# Patient Record
Sex: FEMALE | Race: WHITE | Employment: FULL TIME | URBAN - NONMETROPOLITAN AREA
[De-identification: names, ages, dates, MRNs, and addresses within clinical notes are randomized per-mention and may not be internally consistent; named-entity substitution may affect disease eponyms.]

---

## 2019-08-01 ENCOUNTER — OFFICE VISIT (OUTPATIENT)
Dept: FAMILY MEDICINE CLINIC | Age: 69
End: 2019-08-01
Payer: COMMERCIAL

## 2019-08-01 ENCOUNTER — HOSPITAL ENCOUNTER (OUTPATIENT)
Age: 69
Discharge: HOME | End: 2019-08-03
Payer: COMMERCIAL

## 2019-08-01 VITALS
WEIGHT: 90 LBS | HEIGHT: 62 IN | SYSTOLIC BLOOD PRESSURE: 132 MMHG | TEMPERATURE: 97.6 F | BODY MASS INDEX: 16.56 KG/M2 | HEART RATE: 62 BPM | DIASTOLIC BLOOD PRESSURE: 74 MMHG | OXYGEN SATURATION: 96 %

## 2019-08-01 DIAGNOSIS — N30.01 ACUTE CYSTITIS WITH HEMATURIA: ICD-10-CM

## 2019-08-01 DIAGNOSIS — R30.0 DYSURIA: ICD-10-CM

## 2019-08-01 DIAGNOSIS — R30.0 DYSURIA: Primary | ICD-10-CM

## 2019-08-01 LAB
BILIRUBIN, POC: NORMAL
BLOOD URINE, POC: NORMAL
CLARITY, POC: CLEAR
COLOR, POC: YELLOW
GLUCOSE URINE, POC: NORMAL
KETONES, POC: NORMAL
LEUKOCYTE EST, POC: NORMAL
NITRITE, POC: POSITIVE
PH, POC: 6.5
PROTEIN, POC: 100
SPECIFIC GRAVITY, POC: 1.02
UROBILINOGEN, POC: NORMAL

## 2019-08-01 PROCEDURE — 99213 OFFICE O/P EST LOW 20 MIN: CPT | Performed by: PHYSICIAN ASSISTANT

## 2019-08-01 PROCEDURE — 87088 URINE BACTERIA CULTURE: CPT

## 2019-08-01 PROCEDURE — 87186 SC STD MICRODIL/AGAR DIL: CPT

## 2019-08-01 PROCEDURE — 81002 URINALYSIS NONAUTO W/O SCOPE: CPT | Performed by: PHYSICIAN ASSISTANT

## 2019-08-01 RX ORDER — PHENAZOPYRIDINE HYDROCHLORIDE 200 MG/1
200 TABLET, FILM COATED ORAL 3 TIMES DAILY PRN
Qty: 9 TABLET | Refills: 0 | Status: SHIPPED | OUTPATIENT
Start: 2019-08-01 | End: 2019-08-04

## 2019-08-01 RX ORDER — SULFAMETHOXAZOLE AND TRIMETHOPRIM 800; 160 MG/1; MG/1
1 TABLET ORAL 2 TIMES DAILY
Qty: 14 TABLET | Refills: 0 | Status: SHIPPED | OUTPATIENT
Start: 2019-08-01 | End: 2019-08-08

## 2019-08-01 RX ORDER — METOPROLOL SUCCINATE 100 MG/1
TABLET, EXTENDED RELEASE ORAL
Refills: 11 | COMMUNITY
Start: 2019-07-08

## 2019-08-01 NOTE — PROGRESS NOTES
2019   [address] [place] [address] PRIMARY CARE  [address]  [place] [address]     Lisa Taylor  : 1950  Age: 68 y.o. Sex: female       Subjective:  Chief Complaint   Patient presents with    Urinary Tract Infection       HPI: The patient states that they have had dysuria and urinary frequency for the last 1 day. patient does not feel like she is emptying her bladder. Denies back or flank pain. The patient denies suprapubic pressure. The patient has had urgency and but denies gross hematuria. The patient denies any abdominal or flank pain. The patient denies nausea and vomiting. No fevers or chills. No prior history of kidney stones. The patient has a history of UTI's and states that this feels the same. They come to the urgent care for evaluation. ROS:  Positive and pertinent negatives as per HPI. All other systems are reviewed and negative. Current Outpatient Medications:     sulfamethoxazole-trimethoprim (BACTRIM DS;SEPTRA DS) 800-160 MG per tablet, Take 1 tablet by mouth 2 times daily for 7 days, Disp: 14 tablet, Rfl: 0    phenazopyridine (PYRIDIUM) 200 MG tablet, Take 1 tablet by mouth 3 times daily as needed for Pain, Disp: 9 tablet, Rfl: 0    metoprolol succinate (TOPROL XL) 100 MG extended release tablet, TAKE ONE TABLET BY MOUTH EVERY DAY, Disp: , Rfl: 11    VENTOLIN  (90 Base) MCG/ACT inhaler, INHALE 2 PUFFS EVERY 4 HOURS AS NEEDED FOR SHORTNESS OF BREATH, Disp: , Rfl: 6   No Known Allergies     Objective:  Vitals:    19 1518   BP: 132/74   Site: Left Upper Arm   Position: Sitting   Cuff Size: Medium Adult   Pulse: 62   Temp: 97.6 °F (36.4 °C)   TempSrc: Temporal   SpO2: 96%   Weight: 90 lb (40.8 kg)   Height: 5' 1.5\" (1.562 m)        Exam:  Const: Appears healthy and well developed. No signs of acute distress present. Vitals reviewed per triage. Head/Face: Normocephalic, atraumatic. Facies is symmetric.   ENMT: Buccal

## 2019-08-04 LAB
ORGANISM: ABNORMAL
URINE CULTURE, ROUTINE: ABNORMAL
URINE CULTURE, ROUTINE: ABNORMAL

## 2019-09-03 ENCOUNTER — OFFICE VISIT (OUTPATIENT)
Dept: FAMILY MEDICINE CLINIC | Age: 69
End: 2019-09-03
Payer: COMMERCIAL

## 2019-09-03 VITALS
DIASTOLIC BLOOD PRESSURE: 88 MMHG | SYSTOLIC BLOOD PRESSURE: 138 MMHG | HEART RATE: 68 BPM | TEMPERATURE: 97.7 F | WEIGHT: 89 LBS | OXYGEN SATURATION: 94 % | HEIGHT: 61 IN | BODY MASS INDEX: 16.8 KG/M2

## 2019-09-03 DIAGNOSIS — L30.9 DERMATITIS: Primary | ICD-10-CM

## 2019-09-03 PROCEDURE — 99212 OFFICE O/P EST SF 10 MIN: CPT | Performed by: NURSE PRACTITIONER

## 2019-09-03 RX ORDER — PREDNISONE 10 MG/1
TABLET ORAL
Qty: 30 TABLET | Refills: 0 | Status: SHIPPED | OUTPATIENT
Start: 2019-09-03 | End: 2019-09-15

## 2019-09-03 RX ORDER — TRIAMCINOLONE ACETONIDE 0.25 MG/G
CREAM TOPICAL
Qty: 80 G | Refills: 0 | Status: SHIPPED | OUTPATIENT
Start: 2019-09-03

## 2019-09-03 NOTE — PROGRESS NOTES
Subjective:  Chief Complaint   Patient presents with    Rash     started on back then to legs and arms       HPI:  Patient states rash started several weeks ago. Initially, she had some lesions to her back, but she now has them to bilateral lower extremities and upper extremities. Appears to be worse around the flexor surfaces of her ankles, but it is also over the long bones of both the upper and lower extremities. It is very pruritic. She has tried IV dry without much relief. It is not painful. Fever or chills. Denies any new lotions, soaps, detergents, foods. She has not had any problems with rashes in the past.    ROS:  Positive and pertinent negatives as per HPI. All other systems are reviewed and negative. Current Outpatient Medications:     predniSONE (DELTASONE) 10 MG tablet, Take 4 tablets by mouth daily for 3 days, THEN 3 tablets daily for 3 days, THEN 2 tablets daily for 3 days, THEN 1 tablet daily for 3 days. , Disp: 30 tablet, Rfl: 0    triamcinolone (KENALOG) 0.025 % cream, Apply topically 2 times daily. , Disp: 80 g, Rfl: 0    metoprolol succinate (TOPROL XL) 100 MG extended release tablet, TAKE ONE TABLET BY MOUTH EVERY DAY, Disp: , Rfl: 11    VENTOLIN  (90 Base) MCG/ACT inhaler, INHALE 2 PUFFS EVERY 4 HOURS AS NEEDED FOR SHORTNESS OF BREATH, Disp: , Rfl: 6   No Known Allergies     Objective:  Vitals:    09/03/19 1501   BP: 138/88   Site: Left Upper Arm   Position: Sitting   Cuff Size: Medium Adult   Pulse: 68   Temp: 97.7 °F (36.5 °C)   TempSrc: Temporal   SpO2: 94%   Weight: 89 lb (40.4 kg)   Height: 5' 1\" (1.549 m)        Exam:  Const: Appears healthy and well developed. No signs of acute distress present. Vitals reviewed per triage. Appears very thin. Head/Face: Normocephalic, atraumatic. Facies is symmetric. ENMT:  Nares are patent. Buccal mucosa is moist.  No inflammation or edema of the posterior oropharynx. Neck: Trachea midline.   Cardiac: Regular rate and

## 2020-03-13 ENCOUNTER — APPOINTMENT (OUTPATIENT)
Dept: GENERAL RADIOLOGY | Age: 70
DRG: 673 | End: 2020-03-13
Attending: INTERNAL MEDICINE
Payer: COMMERCIAL

## 2020-03-13 ENCOUNTER — HOSPITAL ENCOUNTER (INPATIENT)
Age: 70
LOS: 13 days | Discharge: NURSING, REHAB OR LONG-TERM CARE | DRG: 673 | End: 2020-03-26
Attending: INTERNAL MEDICINE | Admitting: INTERNAL MEDICINE
Payer: COMMERCIAL

## 2020-03-13 PROBLEM — E87.5 HYPERKALEMIA: Status: ACTIVE | Noted: 2020-03-13

## 2020-03-13 LAB
ANION GAP SERPL CALCULATED.3IONS-SCNC: 20 MMOL/L (ref 7–16)
B.E.: -4.1 MMOL/L (ref -3–3)
BUN BLDV-MCNC: 57 MG/DL (ref 8–23)
CALCIUM SERPL-MCNC: 9.2 MG/DL (ref 8.6–10.2)
CHLORIDE BLD-SCNC: 99 MMOL/L (ref 98–107)
CO2: 19 MMOL/L (ref 22–29)
COHB: 0.5 % (ref 0–1.5)
CREAT SERPL-MCNC: 3.4 MG/DL (ref 0.5–1)
CRITICAL: ABNORMAL
DATE ANALYZED: ABNORMAL
DATE OF COLLECTION: ABNORMAL
GFR AFRICAN AMERICAN: 16
GFR NON-AFRICAN AMERICAN: 13 ML/MIN/1.73
GLUCOSE BLD-MCNC: 125 MG/DL (ref 74–99)
HCO3: 20.5 MMOL/L (ref 22–26)
HHB: 3 % (ref 0–5)
INR BLD: 1.4
LAB: ABNORMAL
Lab: ABNORMAL
METHB: 0.6 % (ref 0–1.5)
MODE: ABNORMAL
O2 CONTENT: 16.1 ML/DL
O2 SATURATION: 97 % (ref 92–98.5)
O2HB: 95.9 % (ref 94–97)
OPERATOR ID: ABNORMAL
PATIENT TEMP: 37 C
PCO2: 35.9 MMHG (ref 35–45)
PH BLOOD GAS: 7.38 (ref 7.35–7.45)
PO2: 102.6 MMHG (ref 75–100)
POTASSIUM SERPL-SCNC: 5.64 MMOL/L (ref 3.5–5)
POTASSIUM SERPL-SCNC: 5.9 MMOL/L (ref 3.5–5)
PROTHROMBIN TIME: 15.4 SEC (ref 9.3–12.4)
SODIUM BLD-SCNC: 138 MMOL/L (ref 132–146)
SOURCE, BLOOD GAS: ABNORMAL
THB: 11.8 G/DL (ref 11.5–16.5)
TIME ANALYZED: 2211

## 2020-03-13 PROCEDURE — 83735 ASSAY OF MAGNESIUM: CPT

## 2020-03-13 PROCEDURE — 71045 X-RAY EXAM CHEST 1 VIEW: CPT

## 2020-03-13 PROCEDURE — 84443 ASSAY THYROID STIM HORMONE: CPT

## 2020-03-13 PROCEDURE — 84145 PROCALCITONIN (PCT): CPT

## 2020-03-13 PROCEDURE — 85610 PROTHROMBIN TIME: CPT

## 2020-03-13 PROCEDURE — 6360000002 HC RX W HCPCS: Performed by: INTERNAL MEDICINE

## 2020-03-13 PROCEDURE — 87081 CULTURE SCREEN ONLY: CPT

## 2020-03-13 PROCEDURE — 82746 ASSAY OF FOLIC ACID SERUM: CPT

## 2020-03-13 PROCEDURE — 2000000000 HC ICU R&B

## 2020-03-13 PROCEDURE — 6360000002 HC RX W HCPCS

## 2020-03-13 PROCEDURE — 83690 ASSAY OF LIPASE: CPT

## 2020-03-13 PROCEDURE — 36415 COLL VENOUS BLD VENIPUNCTURE: CPT

## 2020-03-13 PROCEDURE — 83880 ASSAY OF NATRIURETIC PEPTIDE: CPT

## 2020-03-13 PROCEDURE — 2580000003 HC RX 258

## 2020-03-13 PROCEDURE — 82140 ASSAY OF AMMONIA: CPT

## 2020-03-13 PROCEDURE — 84132 ASSAY OF SERUM POTASSIUM: CPT

## 2020-03-13 PROCEDURE — 0100U HC RESPIRPTHGN MULT REV TRANS & AMP PRB TECH 21 TRGT: CPT

## 2020-03-13 PROCEDURE — 82805 BLOOD GASES W/O2 SATURATION: CPT

## 2020-03-13 PROCEDURE — 84100 ASSAY OF PHOSPHORUS: CPT

## 2020-03-13 PROCEDURE — 94640 AIRWAY INHALATION TREATMENT: CPT

## 2020-03-13 PROCEDURE — 82607 VITAMIN B-12: CPT

## 2020-03-13 PROCEDURE — 85730 THROMBOPLASTIN TIME PARTIAL: CPT

## 2020-03-13 PROCEDURE — 2580000003 HC RX 258: Performed by: INTERNAL MEDICINE

## 2020-03-13 PROCEDURE — 94660 CPAP INITIATION&MGMT: CPT

## 2020-03-13 PROCEDURE — 80048 BASIC METABOLIC PNL TOTAL CA: CPT

## 2020-03-13 RX ORDER — METHYLPREDNISOLONE SODIUM SUCCINATE 40 MG/ML
40 INJECTION, POWDER, LYOPHILIZED, FOR SOLUTION INTRAMUSCULAR; INTRAVENOUS EVERY 8 HOURS
Status: DISCONTINUED | OUTPATIENT
Start: 2020-03-13 | End: 2020-03-14

## 2020-03-13 RX ORDER — HYDRALAZINE HYDROCHLORIDE 20 MG/ML
INJECTION INTRAMUSCULAR; INTRAVENOUS
Status: COMPLETED
Start: 2020-03-13 | End: 2020-03-13

## 2020-03-13 RX ORDER — SODIUM CHLORIDE 0.9 % (FLUSH) 0.9 %
10 SYRINGE (ML) INJECTION PRN
Status: DISCONTINUED | OUTPATIENT
Start: 2020-03-13 | End: 2020-03-17 | Stop reason: SDUPTHER

## 2020-03-13 RX ORDER — SODIUM POLYSTYRENE SULFONATE 15 G/60ML
15 SUSPENSION ORAL; RECTAL ONCE
Status: DISCONTINUED | OUTPATIENT
Start: 2020-03-13 | End: 2020-03-18

## 2020-03-13 RX ORDER — DEXTROSE MONOHYDRATE 25 G/50ML
12.5 INJECTION, SOLUTION INTRAVENOUS PRN
Status: DISCONTINUED | OUTPATIENT
Start: 2020-03-13 | End: 2020-03-26 | Stop reason: HOSPADM

## 2020-03-13 RX ORDER — FUROSEMIDE 10 MG/ML
40 INJECTION INTRAMUSCULAR; INTRAVENOUS ONCE
Status: COMPLETED | OUTPATIENT
Start: 2020-03-13 | End: 2020-03-13

## 2020-03-13 RX ORDER — POLYETHYLENE GLYCOL 3350 17 G/17G
17 POWDER, FOR SOLUTION ORAL DAILY PRN
Status: DISCONTINUED | OUTPATIENT
Start: 2020-03-13 | End: 2020-03-17 | Stop reason: SDUPTHER

## 2020-03-13 RX ORDER — HYDRALAZINE HYDROCHLORIDE 20 MG/ML
5 INJECTION INTRAMUSCULAR; INTRAVENOUS EVERY 4 HOURS PRN
Status: DISCONTINUED | OUTPATIENT
Start: 2020-03-13 | End: 2020-03-14

## 2020-03-13 RX ORDER — NICOTINE POLACRILEX 4 MG
15 LOZENGE BUCCAL PRN
Status: DISCONTINUED | OUTPATIENT
Start: 2020-03-13 | End: 2020-03-18 | Stop reason: SDUPTHER

## 2020-03-13 RX ORDER — NICOTINE POLACRILEX 4 MG
15 LOZENGE BUCCAL PRN
Status: DISCONTINUED | OUTPATIENT
Start: 2020-03-13 | End: 2020-03-26 | Stop reason: HOSPADM

## 2020-03-13 RX ORDER — PROMETHAZINE HYDROCHLORIDE 25 MG/1
12.5 TABLET ORAL EVERY 6 HOURS PRN
Status: DISCONTINUED | OUTPATIENT
Start: 2020-03-13 | End: 2020-03-17 | Stop reason: SDUPTHER

## 2020-03-13 RX ORDER — SODIUM CHLORIDE 0.9 % (FLUSH) 0.9 %
10 SYRINGE (ML) INJECTION EVERY 12 HOURS SCHEDULED
Status: DISCONTINUED | OUTPATIENT
Start: 2020-03-13 | End: 2020-03-17 | Stop reason: SDUPTHER

## 2020-03-13 RX ORDER — THIAMINE HYDROCHLORIDE 100 MG/ML
100 INJECTION, SOLUTION INTRAMUSCULAR; INTRAVENOUS DAILY
Status: DISCONTINUED | OUTPATIENT
Start: 2020-03-14 | End: 2020-03-16 | Stop reason: ALTCHOICE

## 2020-03-13 RX ORDER — ONDANSETRON 2 MG/ML
4 INJECTION INTRAMUSCULAR; INTRAVENOUS EVERY 6 HOURS PRN
Status: DISCONTINUED | OUTPATIENT
Start: 2020-03-13 | End: 2020-03-17 | Stop reason: SDUPTHER

## 2020-03-13 RX ORDER — IPRATROPIUM BROMIDE AND ALBUTEROL SULFATE 2.5; .5 MG/3ML; MG/3ML
1 SOLUTION RESPIRATORY (INHALATION)
Status: DISCONTINUED | OUTPATIENT
Start: 2020-03-14 | End: 2020-03-21

## 2020-03-13 RX ORDER — DEXTROSE MONOHYDRATE 50 MG/ML
100 INJECTION, SOLUTION INTRAVENOUS PRN
Status: DISCONTINUED | OUTPATIENT
Start: 2020-03-13 | End: 2020-03-26 | Stop reason: HOSPADM

## 2020-03-13 RX ORDER — ACETAMINOPHEN 650 MG/1
650 SUPPOSITORY RECTAL EVERY 6 HOURS PRN
Status: DISCONTINUED | OUTPATIENT
Start: 2020-03-13 | End: 2020-03-17 | Stop reason: SDUPTHER

## 2020-03-13 RX ORDER — ARFORMOTEROL TARTRATE 15 UG/2ML
15 SOLUTION RESPIRATORY (INHALATION) 2 TIMES DAILY
Status: DISCONTINUED | OUTPATIENT
Start: 2020-03-13 | End: 2020-03-16

## 2020-03-13 RX ORDER — DEXTROSE MONOHYDRATE 25 G/50ML
12.5 INJECTION, SOLUTION INTRAVENOUS PRN
Status: DISCONTINUED | OUTPATIENT
Start: 2020-03-13 | End: 2020-03-18 | Stop reason: SDUPTHER

## 2020-03-13 RX ORDER — CALCIUM GLUCONATE 94 MG/ML
1 INJECTION, SOLUTION INTRAVENOUS ONCE
Status: COMPLETED | OUTPATIENT
Start: 2020-03-13 | End: 2020-03-13

## 2020-03-13 RX ORDER — ALBUTEROL SULFATE 2.5 MG/3ML
10 SOLUTION RESPIRATORY (INHALATION) ONCE
Status: COMPLETED | OUTPATIENT
Start: 2020-03-13 | End: 2020-03-13

## 2020-03-13 RX ORDER — HEPARIN SODIUM 10000 [USP'U]/ML
5000 INJECTION, SOLUTION INTRAVENOUS; SUBCUTANEOUS EVERY 8 HOURS
Status: DISCONTINUED | OUTPATIENT
Start: 2020-03-13 | End: 2020-03-14

## 2020-03-13 RX ORDER — DEXTROSE MONOHYDRATE 50 MG/ML
100 INJECTION, SOLUTION INTRAVENOUS PRN
Status: DISCONTINUED | OUTPATIENT
Start: 2020-03-13 | End: 2020-03-18 | Stop reason: SDUPTHER

## 2020-03-13 RX ORDER — DEXTROSE MONOHYDRATE 25 G/50ML
25 INJECTION, SOLUTION INTRAVENOUS ONCE
Status: DISCONTINUED | OUTPATIENT
Start: 2020-03-13 | End: 2020-03-26 | Stop reason: HOSPADM

## 2020-03-13 RX ORDER — BUDESONIDE 0.25 MG/2ML
250 INHALANT ORAL 2 TIMES DAILY
Status: DISCONTINUED | OUTPATIENT
Start: 2020-03-13 | End: 2020-03-21

## 2020-03-13 RX ORDER — DEXTROSE MONOHYDRATE 25 G/50ML
25 INJECTION, SOLUTION INTRAVENOUS ONCE
Status: COMPLETED | OUTPATIENT
Start: 2020-03-13 | End: 2020-03-14

## 2020-03-13 RX ORDER — ACETAMINOPHEN 325 MG/1
650 TABLET ORAL EVERY 6 HOURS PRN
Status: DISCONTINUED | OUTPATIENT
Start: 2020-03-13 | End: 2020-03-17 | Stop reason: SDUPTHER

## 2020-03-13 RX ORDER — FOLIC ACID 5 MG/ML
1 INJECTION, SOLUTION INTRAMUSCULAR; INTRAVENOUS; SUBCUTANEOUS DAILY
Status: DISCONTINUED | OUTPATIENT
Start: 2020-03-14 | End: 2020-03-16 | Stop reason: ALTCHOICE

## 2020-03-13 RX ADMIN — ARFORMOTEROL TARTRATE 15 MCG: 15 SOLUTION RESPIRATORY (INHALATION) at 22:13

## 2020-03-13 RX ADMIN — SODIUM CHLORIDE, PRESERVATIVE FREE 10 ML: 5 INJECTION INTRAVENOUS at 22:18

## 2020-03-13 RX ADMIN — FUROSEMIDE 40 MG: 10 INJECTION, SOLUTION INTRAMUSCULAR; INTRAVENOUS at 23:56

## 2020-03-13 RX ADMIN — ALBUTEROL SULFATE 10 MG: 2.5 SOLUTION RESPIRATORY (INHALATION) at 23:05

## 2020-03-13 RX ADMIN — HYDRALAZINE HYDROCHLORIDE 20 MG: 20 INJECTION INTRAMUSCULAR; INTRAVENOUS at 22:17

## 2020-03-13 RX ADMIN — METHYLPREDNISOLONE SODIUM SUCCINATE 40 MG: 40 INJECTION, POWDER, FOR SOLUTION INTRAMUSCULAR; INTRAVENOUS at 23:56

## 2020-03-13 RX ADMIN — CALCIUM GLUCONATE 1 G: 98 INJECTION, SOLUTION INTRAVENOUS at 23:56

## 2020-03-13 RX ADMIN — HEPARIN SODIUM 5000 UNITS: 10000 INJECTION INTRAVENOUS; SUBCUTANEOUS at 23:56

## 2020-03-13 RX ADMIN — BUDESONIDE 250 MCG: 0.25 SUSPENSION RESPIRATORY (INHALATION) at 22:13

## 2020-03-13 ASSESSMENT — PAIN SCALES - GENERAL: PAINLEVEL_OUTOF10: 0

## 2020-03-14 ENCOUNTER — APPOINTMENT (OUTPATIENT)
Dept: CT IMAGING | Age: 70
DRG: 673 | End: 2020-03-14
Attending: INTERNAL MEDICINE
Payer: COMMERCIAL

## 2020-03-14 LAB
ACETAMINOPHEN LEVEL: <5 MCG/ML (ref 10–30)
ADENOVIRUS BY PCR: NOT DETECTED
ALBUMIN SERPL-MCNC: 3.4 G/DL (ref 3.5–5.2)
ALP BLD-CCNC: 100 U/L (ref 35–104)
ALT SERPL-CCNC: 685 U/L (ref 0–32)
AMMONIA: 47 UMOL/L (ref 11–51)
ANION GAP SERPL CALCULATED.3IONS-SCNC: 19 MMOL/L (ref 7–16)
ANION GAP SERPL CALCULATED.3IONS-SCNC: 20 MMOL/L (ref 7–16)
ANION GAP SERPL CALCULATED.3IONS-SCNC: 21 MMOL/L (ref 7–16)
ANISOCYTOSIS: ABNORMAL
ANISOCYTOSIS: ABNORMAL
APTT: 27.5 SEC (ref 24.5–35.1)
AST SERPL-CCNC: 1380 U/L (ref 0–31)
BASOPHILS ABSOLUTE: 0 E9/L (ref 0–0.2)
BASOPHILS ABSOLUTE: 0 E9/L (ref 0–0.2)
BASOPHILS RELATIVE PERCENT: 0.1 % (ref 0–2)
BASOPHILS RELATIVE PERCENT: 0.1 % (ref 0–2)
BILIRUB SERPL-MCNC: 0.8 MG/DL (ref 0–1.2)
BORDETELLA PARAPERTUSSIS BY PCR: NOT DETECTED
BORDETELLA PERTUSSIS BY PCR: NOT DETECTED
BUN BLDV-MCNC: 59 MG/DL (ref 8–23)
BUN BLDV-MCNC: 59 MG/DL (ref 8–23)
BUN BLDV-MCNC: 68 MG/DL (ref 8–23)
C-REACTIVE PROTEIN: 6.1 MG/DL (ref 0–0.4)
CALCIUM SERPL-MCNC: 7.8 MG/DL (ref 8.6–10.2)
CALCIUM SERPL-MCNC: 8.8 MG/DL (ref 8.6–10.2)
CALCIUM SERPL-MCNC: 9.3 MG/DL (ref 8.6–10.2)
CHLAMYDOPHILIA PNEUMONIAE BY PCR: NOT DETECTED
CHLORIDE BLD-SCNC: 100 MMOL/L (ref 98–107)
CHLORIDE BLD-SCNC: 97 MMOL/L (ref 98–107)
CHLORIDE BLD-SCNC: 99 MMOL/L (ref 98–107)
CO2: 19 MMOL/L (ref 22–29)
CO2: 20 MMOL/L (ref 22–29)
CO2: 21 MMOL/L (ref 22–29)
CORONAVIRUS 229E BY PCR: NOT DETECTED
CORONAVIRUS HKU1 BY PCR: NOT DETECTED
CORONAVIRUS NL63 BY PCR: NOT DETECTED
CORONAVIRUS OC43 BY PCR: NOT DETECTED
CREAT SERPL-MCNC: 3.7 MG/DL (ref 0.5–1)
CREAT SERPL-MCNC: 3.8 MG/DL (ref 0.5–1)
CREAT SERPL-MCNC: 4.5 MG/DL (ref 0.5–1)
EOSINOPHILS ABSOLUTE: 0 E9/L (ref 0.05–0.5)
EOSINOPHILS ABSOLUTE: 0 E9/L (ref 0.05–0.5)
EOSINOPHILS RELATIVE PERCENT: 0 % (ref 0–6)
EOSINOPHILS RELATIVE PERCENT: 0 % (ref 0–6)
FERRITIN: 1036 NG/ML
FOLATE: 12.5 NG/ML (ref 4.8–24.2)
GAMMA GLUTAMYL TRANSFERASE: 112 U/L (ref 6–42)
GFR AFRICAN AMERICAN: 12
GFR AFRICAN AMERICAN: 14
GFR AFRICAN AMERICAN: 15
GFR NON-AFRICAN AMERICAN: 10 ML/MIN/1.73
GFR NON-AFRICAN AMERICAN: 12 ML/MIN/1.73
GFR NON-AFRICAN AMERICAN: 12 ML/MIN/1.73
GLUCOSE BLD-MCNC: 106 MG/DL (ref 74–99)
GLUCOSE BLD-MCNC: 117 MG/DL (ref 74–99)
GLUCOSE BLD-MCNC: 171 MG/DL (ref 74–99)
HCT VFR BLD CALC: 33.9 % (ref 34–48)
HCT VFR BLD CALC: 34 % (ref 34–48)
HCT VFR BLD CALC: 34.2 % (ref 34–48)
HEMOGLOBIN: 10.4 G/DL (ref 11.5–15.5)
HEMOGLOBIN: 10.6 G/DL (ref 11.5–15.5)
HEMOGLOBIN: 10.8 G/DL (ref 11.5–15.5)
HUMAN METAPNEUMOVIRUS BY PCR: NOT DETECTED
HUMAN RHINOVIRUS/ENTEROVIRUS BY PCR: NOT DETECTED
INFLUENZA A BY PCR: NOT DETECTED
INFLUENZA B BY PCR: NOT DETECTED
IRON SATURATION: 13 % (ref 15–50)
IRON: 36 MCG/DL (ref 37–145)
LACTIC ACID: 1.1 MMOL/L (ref 0.5–2.2)
LACTIC ACID: 1.6 MMOL/L (ref 0.5–2.2)
LIPASE: 492 U/L (ref 13–60)
LYMPHOCYTES ABSOLUTE: 0.15 E9/L (ref 1.5–4)
LYMPHOCYTES ABSOLUTE: 0.29 E9/L (ref 1.5–4)
LYMPHOCYTES RELATIVE PERCENT: 0.9 % (ref 20–42)
LYMPHOCYTES RELATIVE PERCENT: 1.7 % (ref 20–42)
MAGNESIUM: 2.3 MG/DL (ref 1.6–2.6)
MAGNESIUM: 2.5 MG/DL (ref 1.6–2.6)
MCH RBC QN AUTO: 29.7 PG (ref 26–35)
MCH RBC QN AUTO: 30.5 PG (ref 26–35)
MCHC RBC AUTO-ENTMCNC: 31 % (ref 32–34.5)
MCHC RBC AUTO-ENTMCNC: 31.8 % (ref 32–34.5)
MCV RBC AUTO: 95.8 FL (ref 80–99.9)
MCV RBC AUTO: 96 FL (ref 80–99.9)
METER GLUCOSE: 119 MG/DL (ref 74–99)
METER GLUCOSE: 122 MG/DL (ref 74–99)
METER GLUCOSE: 130 MG/DL (ref 74–99)
METER GLUCOSE: 136 MG/DL (ref 74–99)
METER GLUCOSE: 139 MG/DL (ref 74–99)
MONOCYTES ABSOLUTE: 0.72 E9/L (ref 0.1–0.95)
MONOCYTES ABSOLUTE: 0.87 E9/L (ref 0.1–0.95)
MONOCYTES RELATIVE PERCENT: 5.2 % (ref 2–12)
MONOCYTES RELATIVE PERCENT: 6.1 % (ref 2–12)
MYCOPLASMA PNEUMONIAE BY PCR: NOT DETECTED
NEUTROPHILS ABSOLUTE: 13.3 E9/L (ref 1.8–7.3)
NEUTROPHILS ABSOLUTE: 13.49 E9/L (ref 1.8–7.3)
NEUTROPHILS RELATIVE PERCENT: 93 % (ref 43–80)
NEUTROPHILS RELATIVE PERCENT: 93 % (ref 43–80)
NUCLEATED RED BLOOD CELLS: 1.7 /100 WBC
NUCLEATED RED BLOOD CELLS: 2.6 /100 WBC
OSMOLALITY: 310 MOSM/KG (ref 285–310)
OVALOCYTES: ABNORMAL
OVALOCYTES: ABNORMAL
PARAINFLUENZA VIRUS 1 BY PCR: NOT DETECTED
PARAINFLUENZA VIRUS 2 BY PCR: NOT DETECTED
PARAINFLUENZA VIRUS 3 BY PCR: NOT DETECTED
PARAINFLUENZA VIRUS 4 BY PCR: NOT DETECTED
PDW BLD-RTO: 15.5 FL (ref 11.5–15)
PDW BLD-RTO: 15.6 FL (ref 11.5–15)
PHOSPHORUS: 6.5 MG/DL (ref 2.5–4.5)
PHOSPHORUS: 6.9 MG/DL (ref 2.5–4.5)
PLATELET # BLD: 103 E9/L (ref 130–450)
PLATELET # BLD: 112 E9/L (ref 130–450)
PMV BLD AUTO: 10.1 FL (ref 7–12)
PMV BLD AUTO: 11.1 FL (ref 7–12)
POIKILOCYTES: ABNORMAL
POIKILOCYTES: ABNORMAL
POLYCHROMASIA: ABNORMAL
POLYCHROMASIA: ABNORMAL
POTASSIUM SERPL-SCNC: 5.1 MMOL/L (ref 3.5–5)
POTASSIUM SERPL-SCNC: 5.2 MMOL/L (ref 3.5–5)
POTASSIUM SERPL-SCNC: 5.3 MMOL/L (ref 3.5–5)
PRO-BNP: ABNORMAL PG/ML (ref 0–125)
PROCALCITONIN: 0.92 NG/ML (ref 0–0.08)
RBC # BLD: 3.54 E12/L (ref 3.5–5.5)
RBC # BLD: 3.57 E12/L (ref 3.5–5.5)
RESPIRATORY SYNCYTIAL VIRUS BY PCR: NOT DETECTED
SCHISTOCYTES: ABNORMAL
SCHISTOCYTES: ABNORMAL
SODIUM BLD-SCNC: 137 MMOL/L (ref 132–146)
SODIUM BLD-SCNC: 139 MMOL/L (ref 132–146)
SODIUM BLD-SCNC: 140 MMOL/L (ref 132–146)
T4 TOTAL: 5.4 MCG/DL (ref 4.5–11.7)
TARGET CELLS: ABNORMAL
TOTAL IRON BINDING CAPACITY: 269 MCG/DL (ref 250–450)
TOTAL PROTEIN: 6.2 G/DL (ref 6.4–8.3)
TROPONIN: 0.05 NG/ML (ref 0–0.03)
TROPONIN: 0.05 NG/ML (ref 0–0.03)
TSH SERPL DL<=0.05 MIU/L-ACNC: 4.61 UIU/ML (ref 0.27–4.2)
VITAMIN B-12: 1712 PG/ML (ref 211–946)
WBC # BLD: 14.3 E9/L (ref 4.5–11.5)
WBC # BLD: 14.5 E9/L (ref 4.5–11.5)

## 2020-03-14 PROCEDURE — 82728 ASSAY OF FERRITIN: CPT

## 2020-03-14 PROCEDURE — 2580000003 HC RX 258: Performed by: INTERNAL MEDICINE

## 2020-03-14 PROCEDURE — 86645 CMV ANTIBODY IGM: CPT

## 2020-03-14 PROCEDURE — 99254 IP/OBS CNSLTJ NEW/EST MOD 60: CPT | Performed by: INTERNAL MEDICINE

## 2020-03-14 PROCEDURE — 82977 ASSAY OF GGT: CPT

## 2020-03-14 PROCEDURE — 2500000003 HC RX 250 WO HCPCS

## 2020-03-14 PROCEDURE — 94660 CPAP INITIATION&MGMT: CPT

## 2020-03-14 PROCEDURE — 36556 INSERT NON-TUNNEL CV CATH: CPT

## 2020-03-14 PROCEDURE — 84484 ASSAY OF TROPONIN QUANT: CPT

## 2020-03-14 PROCEDURE — 2580000003 HC RX 258: Performed by: RADIOLOGY

## 2020-03-14 PROCEDURE — 85014 HEMATOCRIT: CPT

## 2020-03-14 PROCEDURE — 6360000002 HC RX W HCPCS: Performed by: INTERNAL MEDICINE

## 2020-03-14 PROCEDURE — 2000000000 HC ICU R&B

## 2020-03-14 PROCEDURE — 86038 ANTINUCLEAR ANTIBODIES: CPT

## 2020-03-14 PROCEDURE — 86665 EPSTEIN-BARR CAPSID VCA: CPT

## 2020-03-14 PROCEDURE — 2500000003 HC RX 250 WO HCPCS: Performed by: INTERNAL MEDICINE

## 2020-03-14 PROCEDURE — 83930 ASSAY OF BLOOD OSMOLALITY: CPT

## 2020-03-14 PROCEDURE — 6370000000 HC RX 637 (ALT 250 FOR IP): Performed by: INTERNAL MEDICINE

## 2020-03-14 PROCEDURE — 80307 DRUG TEST PRSMV CHEM ANLYZR: CPT

## 2020-03-14 PROCEDURE — 86706 HEP B SURFACE ANTIBODY: CPT

## 2020-03-14 PROCEDURE — 99222 1ST HOSP IP/OBS MODERATE 55: CPT | Performed by: SURGERY

## 2020-03-14 PROCEDURE — 80048 BASIC METABOLIC PNL TOTAL CA: CPT

## 2020-03-14 PROCEDURE — 99255 IP/OBS CONSLTJ NEW/EST HI 80: CPT | Performed by: INTERNAL MEDICINE

## 2020-03-14 PROCEDURE — 74176 CT ABD & PELVIS W/O CONTRAST: CPT

## 2020-03-14 PROCEDURE — APPSS180 APP SPLIT SHARED TIME > 60 MINUTES: Performed by: NURSE PRACTITIONER

## 2020-03-14 PROCEDURE — 86140 C-REACTIVE PROTEIN: CPT

## 2020-03-14 PROCEDURE — 84100 ASSAY OF PHOSPHORUS: CPT

## 2020-03-14 PROCEDURE — 83540 ASSAY OF IRON: CPT

## 2020-03-14 PROCEDURE — 80074 ACUTE HEPATITIS PANEL: CPT

## 2020-03-14 PROCEDURE — 82962 GLUCOSE BLOOD TEST: CPT

## 2020-03-14 PROCEDURE — 85025 COMPLETE CBC W/AUTO DIFF WBC: CPT

## 2020-03-14 PROCEDURE — 86644 CMV ANTIBODY: CPT

## 2020-03-14 PROCEDURE — 83550 IRON BINDING TEST: CPT

## 2020-03-14 PROCEDURE — 83735 ASSAY OF MAGNESIUM: CPT

## 2020-03-14 PROCEDURE — 83605 ASSAY OF LACTIC ACID: CPT

## 2020-03-14 PROCEDURE — 6370000000 HC RX 637 (ALT 250 FOR IP): Performed by: NURSE PRACTITIONER

## 2020-03-14 PROCEDURE — C9113 INJ PANTOPRAZOLE SODIUM, VIA: HCPCS | Performed by: INTERNAL MEDICINE

## 2020-03-14 PROCEDURE — 85018 HEMOGLOBIN: CPT

## 2020-03-14 PROCEDURE — 80053 COMPREHEN METABOLIC PANEL: CPT

## 2020-03-14 PROCEDURE — 06HY33Z INSERTION OF INFUSION DEVICE INTO LOWER VEIN, PERCUTANEOUS APPROACH: ICD-10-PCS | Performed by: SURGERY

## 2020-03-14 PROCEDURE — 84436 ASSAY OF TOTAL THYROXINE: CPT

## 2020-03-14 PROCEDURE — 2700000000 HC OXYGEN THERAPY PER DAY

## 2020-03-14 PROCEDURE — 36415 COLL VENOUS BLD VENIPUNCTURE: CPT

## 2020-03-14 PROCEDURE — 94640 AIRWAY INHALATION TREATMENT: CPT

## 2020-03-14 RX ORDER — METOPROLOL TARTRATE 5 MG/5ML
5 INJECTION INTRAVENOUS ONCE
Status: COMPLETED | OUTPATIENT
Start: 2020-03-14 | End: 2020-03-14

## 2020-03-14 RX ORDER — LABETALOL HYDROCHLORIDE 5 MG/ML
10 INJECTION, SOLUTION INTRAVENOUS ONCE
Status: COMPLETED | OUTPATIENT
Start: 2020-03-14 | End: 2020-03-14

## 2020-03-14 RX ORDER — PREDNISONE 20 MG/1
20 TABLET ORAL DAILY
Status: ON HOLD | COMMUNITY
End: 2020-03-26 | Stop reason: HOSPADM

## 2020-03-14 RX ORDER — SODIUM CHLORIDE 9 MG/ML
10 INJECTION INTRAVENOUS 2 TIMES DAILY
Status: DISCONTINUED | OUTPATIENT
Start: 2020-03-14 | End: 2020-03-16

## 2020-03-14 RX ORDER — METOPROLOL SUCCINATE 50 MG/1
50 TABLET, EXTENDED RELEASE ORAL 2 TIMES DAILY
Status: DISCONTINUED | OUTPATIENT
Start: 2020-03-14 | End: 2020-03-16 | Stop reason: SDUPTHER

## 2020-03-14 RX ORDER — SODIUM CHLORIDE 0.9 % (FLUSH) 0.9 %
10 SYRINGE (ML) INJECTION ONCE
Status: COMPLETED | OUTPATIENT
Start: 2020-03-14 | End: 2020-03-14

## 2020-03-14 RX ORDER — METOPROLOL TARTRATE 5 MG/5ML
5 INJECTION INTRAVENOUS EVERY 6 HOURS
Status: DISCONTINUED | OUTPATIENT
Start: 2020-03-14 | End: 2020-03-16

## 2020-03-14 RX ORDER — HYDRALAZINE HYDROCHLORIDE 20 MG/ML
5 INJECTION INTRAMUSCULAR; INTRAVENOUS ONCE
Status: COMPLETED | OUTPATIENT
Start: 2020-03-14 | End: 2020-03-14

## 2020-03-14 RX ORDER — SODIUM POLYSTYRENE SULFONATE 15 G/60ML
15 SUSPENSION ORAL; RECTAL ONCE
Status: COMPLETED | OUTPATIENT
Start: 2020-03-14 | End: 2020-03-14

## 2020-03-14 RX ORDER — DILTIAZEM HYDROCHLORIDE 180 MG/1
180 CAPSULE, EXTENDED RELEASE ORAL DAILY
Status: ON HOLD | COMMUNITY
End: 2020-03-26 | Stop reason: HOSPADM

## 2020-03-14 RX ORDER — FUROSEMIDE 10 MG/ML
60 INJECTION INTRAMUSCULAR; INTRAVENOUS ONCE
Status: COMPLETED | OUTPATIENT
Start: 2020-03-14 | End: 2020-03-14

## 2020-03-14 RX ORDER — FERROUS SULFATE 325(65) MG
325 TABLET ORAL 2 TIMES DAILY WITH MEALS
Status: DISCONTINUED | OUTPATIENT
Start: 2020-03-14 | End: 2020-03-26 | Stop reason: HOSPADM

## 2020-03-14 RX ORDER — SPIRONOLACTONE 25 MG/1
25 TABLET ORAL DAILY
Status: ON HOLD | COMMUNITY
End: 2020-03-26 | Stop reason: HOSPADM

## 2020-03-14 RX ORDER — METHYLPREDNISOLONE SODIUM SUCCINATE 40 MG/ML
40 INJECTION, POWDER, LYOPHILIZED, FOR SOLUTION INTRAMUSCULAR; INTRAVENOUS EVERY 6 HOURS
Status: DISCONTINUED | OUTPATIENT
Start: 2020-03-14 | End: 2020-03-15

## 2020-03-14 RX ORDER — PANTOPRAZOLE SODIUM 40 MG/10ML
40 INJECTION, POWDER, LYOPHILIZED, FOR SOLUTION INTRAVENOUS 2 TIMES DAILY
Status: DISCONTINUED | OUTPATIENT
Start: 2020-03-14 | End: 2020-03-16

## 2020-03-14 RX ORDER — DILTIAZEM HYDROCHLORIDE 180 MG/1
180 CAPSULE, COATED, EXTENDED RELEASE ORAL DAILY
Status: DISCONTINUED | OUTPATIENT
Start: 2020-03-14 | End: 2020-03-14

## 2020-03-14 RX ORDER — POTASSIUM CHLORIDE 750 MG/1
20 CAPSULE, EXTENDED RELEASE ORAL DAILY
COMMUNITY

## 2020-03-14 RX ORDER — SODIUM CHLORIDE 9 MG/ML
INJECTION, SOLUTION INTRAVENOUS CONTINUOUS
Status: DISCONTINUED | OUTPATIENT
Start: 2020-03-14 | End: 2020-03-15

## 2020-03-14 RX ORDER — 0.9 % SODIUM CHLORIDE 0.9 %
500 INTRAVENOUS SOLUTION INTRAVENOUS ONCE
Status: COMPLETED | OUTPATIENT
Start: 2020-03-14 | End: 2020-03-14

## 2020-03-14 RX ORDER — DILTIAZEM HYDROCHLORIDE 5 MG/ML
10 INJECTION INTRAVENOUS ONCE
Status: COMPLETED | OUTPATIENT
Start: 2020-03-14 | End: 2020-03-14

## 2020-03-14 RX ORDER — POTASSIUM CITRATE 10 MEQ/1
TABLET, EXTENDED RELEASE ORAL
COMMUNITY

## 2020-03-14 RX ORDER — METOPROLOL TARTRATE 5 MG/5ML
INJECTION INTRAVENOUS
Status: COMPLETED
Start: 2020-03-14 | End: 2020-03-14

## 2020-03-14 RX ADMIN — DILTIAZEM HYDROCHLORIDE 10 MG: 5 INJECTION INTRAVENOUS at 11:29

## 2020-03-14 RX ADMIN — METOPROLOL TARTRATE 5 MG: 5 INJECTION INTRAVENOUS at 08:50

## 2020-03-14 RX ADMIN — INSULIN HUMAN 10 UNITS: 100 INJECTION, SOLUTION PARENTERAL at 00:06

## 2020-03-14 RX ADMIN — METOPROLOL TARTRATE 5 MG: 5 INJECTION INTRAVENOUS at 04:25

## 2020-03-14 RX ADMIN — METOPROLOL SUCCINATE 50 MG: 50 TABLET, EXTENDED RELEASE ORAL at 20:16

## 2020-03-14 RX ADMIN — IPRATROPIUM BROMIDE AND ALBUTEROL SULFATE 1 AMPULE: 2.5; .5 SOLUTION RESPIRATORY (INHALATION) at 20:36

## 2020-03-14 RX ADMIN — SODIUM CHLORIDE, PRESERVATIVE FREE 10 ML: 5 INJECTION INTRAVENOUS at 08:51

## 2020-03-14 RX ADMIN — ARFORMOTEROL TARTRATE 15 MCG: 15 SOLUTION RESPIRATORY (INHALATION) at 08:51

## 2020-03-14 RX ADMIN — METOPROLOL TARTRATE 5 MG: 5 INJECTION INTRAVENOUS at 16:25

## 2020-03-14 RX ADMIN — LABETALOL HYDROCHLORIDE 10 MG: 5 INJECTION INTRAVENOUS at 07:46

## 2020-03-14 RX ADMIN — DEXTROSE MONOHYDRATE 5 MG/HR: 50 INJECTION, SOLUTION INTRAVENOUS at 11:32

## 2020-03-14 RX ADMIN — PANTOPRAZOLE SODIUM 40 MG: 40 INJECTION, POWDER, FOR SOLUTION INTRAVENOUS at 02:38

## 2020-03-14 RX ADMIN — SODIUM CHLORIDE 500 ML: 9 INJECTION, SOLUTION INTRAVENOUS at 10:40

## 2020-03-14 RX ADMIN — FOLIC ACID 1 MG: 5 INJECTION, SOLUTION INTRAMUSCULAR; INTRAVENOUS; SUBCUTANEOUS at 08:48

## 2020-03-14 RX ADMIN — HYDRALAZINE HYDROCHLORIDE 5 MG: 20 INJECTION INTRAMUSCULAR; INTRAVENOUS at 01:21

## 2020-03-14 RX ADMIN — METOPROLOL TARTRATE 5 MG: 5 INJECTION INTRAVENOUS at 20:16

## 2020-03-14 RX ADMIN — SODIUM CHLORIDE, PRESERVATIVE FREE 10 ML: 5 INJECTION INTRAVENOUS at 02:46

## 2020-03-14 RX ADMIN — SODIUM BICARBONATE: 84 INJECTION, SOLUTION INTRAVENOUS at 14:50

## 2020-03-14 RX ADMIN — METHYLPREDNISOLONE SODIUM SUCCINATE 40 MG: 40 INJECTION, POWDER, FOR SOLUTION INTRAMUSCULAR; INTRAVENOUS at 10:22

## 2020-03-14 RX ADMIN — HYDRALAZINE HYDROCHLORIDE 5 MG: 20 INJECTION INTRAMUSCULAR; INTRAVENOUS at 05:32

## 2020-03-14 RX ADMIN — DEXTROSE MONOHYDRATE 25 G: 25 INJECTION, SOLUTION INTRAVENOUS at 00:06

## 2020-03-14 RX ADMIN — SODIUM CHLORIDE: 9 INJECTION, SOLUTION INTRAVENOUS at 01:38

## 2020-03-14 RX ADMIN — BUDESONIDE 250 MCG: 0.25 SUSPENSION RESPIRATORY (INHALATION) at 20:37

## 2020-03-14 RX ADMIN — METHYLPREDNISOLONE SODIUM SUCCINATE 40 MG: 40 INJECTION, POWDER, FOR SOLUTION INTRAMUSCULAR; INTRAVENOUS at 23:00

## 2020-03-14 RX ADMIN — IPRATROPIUM BROMIDE AND ALBUTEROL SULFATE 1 AMPULE: 2.5; .5 SOLUTION RESPIRATORY (INHALATION) at 12:27

## 2020-03-14 RX ADMIN — ARFORMOTEROL TARTRATE 15 MCG: 15 SOLUTION RESPIRATORY (INHALATION) at 20:36

## 2020-03-14 RX ADMIN — SODIUM CHLORIDE, PRESERVATIVE FREE 10 ML: 5 INJECTION INTRAVENOUS at 20:16

## 2020-03-14 RX ADMIN — SODIUM POLYSTYRENE SULFONATE 15 G: 15 SUSPENSION ORAL; RECTAL at 20:39

## 2020-03-14 RX ADMIN — SODIUM CHLORIDE, PRESERVATIVE FREE 10 ML: 5 INJECTION INTRAVENOUS at 20:17

## 2020-03-14 RX ADMIN — FERROUS SULFATE TAB 325 MG (65 MG ELEMENTAL FE) 325 MG: 325 (65 FE) TAB at 11:40

## 2020-03-14 RX ADMIN — PANTOPRAZOLE SODIUM 40 MG: 40 INJECTION, POWDER, FOR SOLUTION INTRAVENOUS at 08:51

## 2020-03-14 RX ADMIN — IPRATROPIUM BROMIDE AND ALBUTEROL SULFATE 1 AMPULE: 2.5; .5 SOLUTION RESPIRATORY (INHALATION) at 16:19

## 2020-03-14 RX ADMIN — HYDRALAZINE HYDROCHLORIDE 5 MG: 20 INJECTION INTRAMUSCULAR; INTRAVENOUS at 06:00

## 2020-03-14 RX ADMIN — METHYLPREDNISOLONE SODIUM SUCCINATE 40 MG: 40 INJECTION, POWDER, FOR SOLUTION INTRAMUSCULAR; INTRAVENOUS at 16:24

## 2020-03-14 RX ADMIN — FERROUS SULFATE TAB 325 MG (65 MG ELEMENTAL FE) 325 MG: 325 (65 FE) TAB at 16:25

## 2020-03-14 RX ADMIN — METOPROLOL SUCCINATE 50 MG: 50 TABLET, EXTENDED RELEASE ORAL at 12:38

## 2020-03-14 RX ADMIN — BUDESONIDE 250 MCG: 0.25 SUSPENSION RESPIRATORY (INHALATION) at 08:51

## 2020-03-14 RX ADMIN — IPRATROPIUM BROMIDE AND ALBUTEROL SULFATE 1 AMPULE: 2.5; .5 SOLUTION RESPIRATORY (INHALATION) at 08:50

## 2020-03-14 RX ADMIN — AMPICILLIN SODIUM AND SULBACTAM SODIUM 3 G: 2; 1 INJECTION, POWDER, FOR SOLUTION INTRAMUSCULAR; INTRAVENOUS at 01:41

## 2020-03-14 RX ADMIN — SODIUM CHLORIDE, PRESERVATIVE FREE 10 ML: 5 INJECTION INTRAVENOUS at 02:40

## 2020-03-14 RX ADMIN — FUROSEMIDE 60 MG: 10 INJECTION, SOLUTION INTRAMUSCULAR; INTRAVENOUS at 16:25

## 2020-03-14 RX ADMIN — PANTOPRAZOLE SODIUM 40 MG: 40 INJECTION, POWDER, FOR SOLUTION INTRAVENOUS at 20:16

## 2020-03-14 RX ADMIN — THIAMINE HYDROCHLORIDE 100 MG: 100 INJECTION, SOLUTION INTRAMUSCULAR; INTRAVENOUS at 08:49

## 2020-03-14 RX ADMIN — PROMETHAZINE HYDROCHLORIDE 12.5 MG: 25 TABLET ORAL at 01:43

## 2020-03-14 SDOH — HEALTH STABILITY: MENTAL HEALTH: HOW MANY STANDARD DRINKS CONTAINING ALCOHOL DO YOU HAVE ON A TYPICAL DAY?: PATIENT DECLINED

## 2020-03-14 SDOH — HEALTH STABILITY: MENTAL HEALTH: HOW OFTEN DO YOU HAVE A DRINK CONTAINING ALCOHOL?: 2-3 TIMES A WEEK

## 2020-03-14 ASSESSMENT — PAIN SCALES - GENERAL
PAINLEVEL_OUTOF10: 0

## 2020-03-14 NOTE — H&P
Tati Contreras M.D. History and Physical      CHIEF COMPLAINT: Weakness    Reason for Admission: Hyperkalemia, atrial fibrillation with RVR    History Obtained From: Patient/EMR    HISTORY OF PRESENT ILLNESS:      The patient is a 68 y.o. female of Dr. Shawn Dangelo with significant past medical history of end-stage COPD who presents with complaints of weakness lethargy. Patient noted at [facility] to be bradycardic followed by atrial fibrillation with rapid ventricular rate. Patient also noted to be in acute renal failure with hyperkalemia. proBNP 50,000. Transferred to medical intensive care unit [facility]. On my evaluation, she is slow to respond but does seem to be appropriate. Remains on Cardizem drip. She is noted to be anemic as well with melena-source thought to be upper GI bleeding. She was transferred to [facility] secondary to inability to do vascular access at [facility] for possible emergent hemodialysis given hyperkalemia. BP (!) 162/88   Pulse 127   Temp 98.5 °F (36.9 °C) (Axillary)   Resp 18   Wt 97 lb (44 kg)   SpO2 98%   BMI 18.33 kg/m²   CT abdomen pelvis no acute inflammatory changes      Past Medical History:    No past medical history on file. Past Surgical History:    No past surgical history on file. Medications Prior to Admission:    Medications Prior to Admission: dilTIAZem (DILACOR XR) 180 MG extended release capsule, Take 180 mg by mouth daily 2 CAP PO DAILY  spironolactone (ALDACTONE) 25 MG tablet, Take 25 mg by mouth daily HALF TAB  triamcinolone (KENALOG) 0.025 % cream, Apply topically 2 times daily.   metoprolol succinate (TOPROL XL) 100 MG extended release tablet, TAKE ONE TABLET BY MOUTH EVERY DAY  VENTOLIN  (90 Base) MCG/ACT inhaler, INHALE 2 PUFFS EVERY 4 HOURS AS NEEDED FOR SHORTNESS OF BREATH  potassium citrate (UROCIT-K) 10 MEQ (1080 MG) extended release tablet, Take by mouth 3 times 03/14/20  0530   WBC 14.3* 14.5*   HGB 10.6* 10.8*   * 103*     Recent Labs     03/13/20  2210 03/13/20  2211 03/14/20  0209 03/14/20  0600     --  139 140   K 5.9* 5.64* 5.2* 5.3*   BUN 57*  --  59* 59*   CREATININE 3.4*  --  3.7* 3.8*     Recent Labs     03/13/20  2313 03/14/20  0600   PROT  --  6.2*   INR 1.4  --      Recent Labs     03/14/20  0600   AST 1,380*   *   ALKPHOS 100   BILITOT 0.8     No results for input(s): BNP in the last 72 hours. Recent Labs     03/14/20  0450 03/14/20  1003   TROPONINI 0.05* 0.05*       ASSESSMENT:      Active Problems:    Hyperkalemia    Atrial fibrillation (HCC)    Acute renal insufficiency    Chronic heart failure with preserved ejection fraction (HFpEF) (HCC)    Acute and chr resp failure, unsp w hypoxia or hypercapnia (HCC)    Essential hypertension  Resolved Problems:    * No resolved hospital problems.  *        PLAN:    Admit to medical intensive care unit for multiple issues  Atrial fibrillation with RVR  Continue Cardizem drip  Unable to anticoagulate secondary to GI bleed   echocardiogram given 50,000 BNP at the discretion of cardiology  Monitor in ICU  Cardiology following    Acute anemia concerning for GI bleed  For EGD, serial H&H's  General surgery following    Hyperkalemia with acute renal failure  Offending agents in the form of Aldactone/oral potassium supplements discontinued  IV fluid hydration  Consideration being given for dialysis-no immediate need      DVT prophylaxis  PT OT  Discharge plan      Ani Holguin MD  3/14/2020  4:43 PM

## 2020-03-14 NOTE — CONSULTS
GENERAL SURGERY  CONSULT NOTE  3/14/2020    Physician Consulted: Dr. Barrett Luna  Reason for Consult: GIB  Referring Physician: Dr. Cale Sanchez    [facility]  Paul Casanova is a 68 y.o. female who was sent in the [facility] for evaluation of left lower extremity weakness upon arrival she was found to be bradycardic in the 30s . She has a history of end-stage COPD was recently placed on oxygen and given prednisone. During that admission she was found to be hyperkalemic with a potassium greater than 7 and went into A. fib with RVR. She was transferred here for evaluation and possible hemodialysis. She is on home spironolactone and oral potassium which is been held. She denies any blood thinners. Dental surgery was consulted due to heme positive stools. She states her last EGD and colonoscopy were last fall of at [address] she had polyps removed but is unclear what was seen on her EGD. She denies any prior abdominal surgeries. She currently denies any abdominal pain. She denies fevers chills chest pain. She wears BiPAP overnight and is currently on 6 L nasal cannula    No past medical history on file. No past surgical history on file. Medications Prior to Admission:    Prior to Admission medications    Medication Sig Start Date End Date Taking? Authorizing Provider   dilTIAZem (DILACOR XR) 180 MG extended release capsule Take 180 mg by mouth daily 2 CAP PO DAILY   Yes Historical Provider, MD   spironolactone (ALDACTONE) 25 MG tablet Take 25 mg by mouth daily HALF TAB   Yes Historical Provider, MD   triamcinolone (KENALOG) 0.025 % cream Apply topically 2 times daily.  9/3/19  Yes SAMUEL Ellison CNP   metoprolol succinate (TOPROL XL) 100 MG extended release tablet TAKE ONE TABLET BY MOUTH EVERY DAY 7/8/19  Yes Historical Provider, MD   VENTOLIN  (90 Base) MCG/ACT inhaler INHALE 2 PUFFS EVERY 4 HOURS AS NEEDED FOR SHORTNESS OF BREATH 7/8/19  Yes Historical Provider, MD   potassium citrate (UROCIT-K) 10 MEQ (1080 MG) extended release tablet Take by mouth 3 times daily (with meals)    Historical Provider, MD   potassium chloride (MICRO-K) 10 MEQ extended release capsule Take 20 mEq by mouth daily    Historical Provider, MD   predniSONE (DELTASONE) 20 MG tablet Take 20 mg by mouth daily    Historical Provider, MD       No Known Allergies    No family history on file. Social History     Tobacco Use    Smoking status: Current Every Day Smoker     Packs/day: 1.00     Types: Cigarettes   Substance Use Topics    Alcohol use: Yes     Frequency: 2-3 times a week     Drinks per session: Patient refused     Binge frequency: Patient refused    Drug use: Not Currently     Types: Marijuana         Review of Systems: Pertinent ROS listed in HPI, all others negative     PHYSICAL EXAM:    Vitals:    03/14/20 1200   BP: (!) 163/101   Pulse: 138   Resp: 14   Temp: 98.5 °F (36.9 °C)   SpO2: 99%       GENERAL:  NAD. A&Ox3. HEAD:  Normocephalic. Atraumatic. EYES:   No scleral icterus. PERRL. LUNGS: On 6 L nasal cannula. Was on BiPAP overnight. CARDIOVASCULAR: A. fib with RVR  ABDOMEN:  Soft, non-distended, non-tender. No guarding, rigidity, rebound. EXTREMITIES:   No lower extremity edema  SKIN:  Warm and dry  NEUROLOGIC:  GCS   RECTAL: No external or obvious internal hemorrhoids. FOBT positive black stool    LABS:    CBC  Recent Labs     03/14/20  0530   WBC 14.5*   HGB 10.8*   HCT 34.0   *     BMP  Recent Labs     03/14/20  0600      K 5.3*      CO2 20*   BUN 59*   CREATININE 3.8*   CALCIUM 8.8     Liver Function  Recent Labs     03/13/20  2313 03/14/20  0600   LIPASE 492*  --    BILITOT  --  0.8   AST  --  1,380*   ALT  --  685*   ALKPHOS  --  100   PROT  --  6.2*   LABALBU  --  3.4*     No results for input(s): LACTATE in the last 72 hours.   Recent Labs     03/13/20  2313   INR 1.4       RADIOLOGY    Ct Abdomen Pelvis Wo Contrast Additional Contrast? None    Result Date:

## 2020-03-14 NOTE — PROGRESS NOTES
03/13/20 2213   NIV Type   $NIV $Daily Charge   Skin Assessment Clean, dry, & intact   Skin Protection for O2 Device Yes   Equipment Type v60   Mode Bilevel   Mask Type Full face mask   Mask Size Small   Bonnet size Small   Settings/Measurements   IPAP 15 cmH20   CPAP/EPAP 8 cmH2O   Resp 19   FiO2  50 %   Minute Volume 14 Liters   Mask Leak (lpm) 15 lpm   Comfort Level Good   Using Accessory Muscles Yes   Date: 3/13/2020    Time: 10:28 PM    Patient Placed On BIPAP/CPAP/ Non-Invasive Ventilation? Yes    If no must comment. Facial area red/color change? No           If YES are Blister/Lesion present? No   If yes must notify nursing staff  BIPAP/CPAP skin barrier?   Yes    Skin barrier type:duoderm       Comments:Patient's face is flush but not red from bipap         Rudolfo Pair

## 2020-03-14 NOTE — CONSULTS
Inpatient Cardiology Consultation      Reason for Consult: Atrial Fibrillation with RVR    Consulting Physician: Dr. Lakeshia Morris    Requesting Physician:  Dr. Ursula Kruger    Date of Consultation: 3/14/2020    HISTORY OF PRESENT ILLNESS:     Mrs. Brant Guerrero is a  68year old female who is not previously known to [facility] cardiology, with a PMHx COPD, tobacco abuse, EtOH abuse, hypertension, CKD. She was transferred from [facility] to [facility] MICU. Please note limited HPI obtained from EMR as patient currently in respiratory distress with BiPAP in place. Reportedly she has not been feeling well for the past month and 2 days prior to arrival developed nausea, vomiting with poor oral intake. Upon presentation to the [facility] she was in hypertensive emergency, KAYLA with hyperkalemia (potassium greater than 7). She was transferred to [facility] for further evaluation and treatment and possible hemodialysis. This morning on telemetry the patient went into atrial fibrillation with RVR, due to limited history unclear if she has a history of atrial fibrillation. On review of home medication she does take Toprol and Cardizem, however no [address] listed. Please note: past medical records were reviewed per electronic medical record (EMR) - see detailed reports under Past Medical/ Surgical History. Past Medical History:    Hypertension  COPD  CKD  Tobacco use  EtOH use    Past Surgical History:    No past surgical history on file. Medications Prior to admit:  Prior to Admission medications    Medication Sig Start Date End Date Taking? Authorizing Provider   dilTIAZem (DILACOR XR) 180 MG extended release capsule Take 180 mg by mouth daily 2 CAP PO DAILY   Yes Historical Provider, MD   spironolactone (ALDACTONE) 25 MG tablet Take 25 mg by mouth daily HALF TAB   Yes Historical Provider, MD   triamcinolone (KENALOG) 0.025 % cream Apply topically 2 times daily.  9/3/19  Yes SAMUEL Lobato - CNP   metoprolol succinate (TOPROL XL) 100 MG extended release tablet TAKE ONE TABLET BY MOUTH EVERY DAY 7/8/19  Yes Historical Provider, MD   VENTOLIN  (90 Base) MCG/ACT inhaler INHALE 2 PUFFS EVERY 4 HOURS AS NEEDED FOR SHORTNESS OF BREATH 7/8/19  Yes Historical Provider, MD   potassium citrate (UROCIT-K) 10 MEQ (1080 MG) extended release tablet Take by mouth 3 times daily (with meals)    Historical Provider, MD   potassium chloride (MICRO-K) 10 MEQ extended release capsule Take 20 mEq by mouth daily    Historical Provider, MD   predniSONE (DELTASONE) 20 MG tablet Take 20 mg by mouth daily    Historical Provider, MD       Current Medications:    Current Facility-Administered Medications: 0.9 % sodium chloride infusion, , Intravenous, Continuous  pantoprazole (PROTONIX) injection 40 mg, 40 mg, Intravenous, BID **AND** sodium chloride (PF) 0.9 % injection 10 mL, 10 mL, Intravenous, BID  metoprolol (LOPRESSOR) injection 5 mg, 5 mg, Intravenous, Q6H  methylPREDNISolone sodium (SOLU-MEDROL) injection 40 mg, 40 mg, Intravenous, Q6H  sodium bicarbonate 150 mEq in dextrose 5 % 1,000 mL infusion, , Intravenous, Continuous  [START ON 3/15/2020] ampicillin-sulbactam (UNASYN) 3 g ivpb minibag, 3 g, Intravenous, Q24H  [COMPLETED] dilTIAZem injection 10 mg, 10 mg, Intravenous, Once **FOLLOWED BY** dilTIAZem 100 mg in dextrose 5 % 100 mL infusion (ADD-[place]), 5 mg/hr, Intravenous, Continuous  ferrous sulfate (IRON 325) tablet 325 mg, 325 mg, Oral, BID WC  metoprolol succinate (TOPROL XL) extended release tablet 50 mg, 50 mg, Oral, BID  sodium chloride flush 0.9 % injection 10 mL, 10 mL, Intravenous, 2 times per day  sodium chloride flush 0.9 % injection 10 mL, 10 mL, Intravenous, PRN  acetaminophen (TYLENOL) tablet 650 mg, 650 mg, Oral, Q6H PRN **OR** acetaminophen (TYLENOL) suppository 650 mg, 650 mg, Rectal, Q6H PRN  polyethylene glycol (GLYCOLAX) packet 17 g, 17 g, Oral, Daily PRN  promethazine (PHENERGAN) tablet 12.5 mg, 12.5 mg, Oral, Q6H RVR -  Currently on cardizem gtt at 5 mg/hr  2. Unable to use [facility] in the setting of GIB  3. Hypertensive emergency   4. KAYLA - nephrology following   5. Hyperkalemia  6. Acute respiratory failure on Bipap  7. COPD with hx of tobacco use  8. Transaminitis  9. Hx of ETOH abuse  10. GIB - melanotic stools and positive FOBT. Surgery following. PLAN:  1. Toprol 25 mg BID  2. Continue cardizem gtt and wean as able  3. Echo pending  4. No OAC in the setting of GIB  5.  Further recommendations to follow       Above discussed with Dr. Ciro Oliva     Electronically signed by SAMUEL Pena CNP on 3/14/2020 at 12:17 PM

## 2020-03-14 NOTE — CONSULTS
Department of Internal Medicine  Nephrology Attending Consult Note      Reason for Consult:  Hyperkalemia and KAYLA  Requesting Physician:  Dr. Randolph Borne:  Transferred for possible dialysis     History Obtained From:  patient, electronic medical record    HISTORY OF PRESENT ILLNESS: Briefly Mrs. Shannan Vazquez is a 68-year-old woman with history of CKD stage II, baseline creatinine 1 to 1.1 mg deciliter, HTN, COPD, alcohol abuse, who was admitted on March 13, 2020 transferred from [facility] where she initially presented with left leg weakness and was found to have creatinine level of 3.4 m/dL and a potassium level >7 mEq/L. In view of the lack of vascular surgeon available this week and the consideration of need of dialysis with possible need of dialysis catheter placement patient was transferred to this hospital.  Prior to admission patient reports having nausea vomiting and diarrhea. She denies taking NSAIDs. Her medications prior to admission included spironolactone and potassium supplementation. Past Medical History:    No past medical history on file. Past Surgical History:    No past surgical history on file.   Current Medications:    Current Facility-Administered Medications: 0.9 % sodium chloride infusion, , Intravenous, Continuous  pantoprazole (PROTONIX) injection 40 mg, 40 mg, Intravenous, BID **AND** sodium chloride (PF) 0.9 % injection 10 mL, 10 mL, Intravenous, BID  metoprolol (LOPRESSOR) injection 5 mg, 5 mg, Intravenous, Q6H  methylPREDNISolone sodium (SOLU-MEDROL) injection 40 mg, 40 mg, Intravenous, Q6H  sodium bicarbonate 150 mEq in dextrose 5 % 1,000 mL infusion, , Intravenous, Continuous  0.9 % sodium chloride bolus, 500 mL, Intravenous, Once  [START ON 3/15/2020] ampicillin-sulbactam (UNASYN) 3 g ivpb minibag, 3 g, Intravenous, Q24H  dilTIAZem injection 10 mg, 10 mg, Intravenous, Once **FOLLOWED BY** dilTIAZem 100 mg in dextrose 5 % 100 mL infusion (ADD-[place]), 5 smoking cigarettes. She has been smoking about 1.00 pack per day. She does not have any smokeless tobacco history on file. ETOH:   reports current alcohol use. Family History:   No family history on file.      REVIEW OF SYSTEMS:    CONSTITUTIONAL:  negative for  fevers and chills  EYES:  negative for  double vision and blurred vision  HEENT:  negative for  hearing loss and epistaxis  RESPIRATORY:  negative for  dyspnea  CARDIOVASCULAR:  negative for  chest pain, dyspnea  GASTROINTESTINAL:  positive for nausea, vomiting and diarrhea  GENITOURINARY:  negative for hematuria  INTEGUMENT/BREAST:  negative for rash and skin lesion(s)  HEMATOLOGIC/LYMPHATIC:  negative for easy bruising and bleeding  ALLERGIC/IMMUNOLOGIC:  negative  ENDOCRINE:  negative for heat intolerance and cold intolerance  MUSCULOSKELETAL:  negative for  myalgias  NEUROLOGICAL:  negative for headaches and dizziness    PHYSICAL EXAM:      Vitals:    VITALS:  BP (!) 184/103   Pulse 125   Temp 98.6 °F (37 °C) (Oral)   Resp 16   Wt 97 lb (44 kg)   SpO2 95%   BMI 18.33 kg/m²   24HR INTAKE/OUTPUT:      Intake/Output Summary (Last 24 hours) at 3/14/2020 1103  Last data filed at 3/14/2020 0900  Gross per 24 hour   Intake 284 ml   Output 15 ml   Net 269 ml       Constitutional: Patient is awake alert she is on BiPAP  HEENT: Pupils are equal reactive, mucous membranes are very dry  Respiratory: Lungs are clear  Cardiovascular/Edema: Heart sounds are regular  Gastrointestinal: Abdomen soft  Neurologic: Nonfocal  Skin: No lesions  Other: No edema    DATA:    CBC:   Lab Results   Component Value Date    WBC 14.5 03/14/2020    RBC 3.54 03/14/2020    HGB 10.8 03/14/2020    HCT 34.0 03/14/2020    MCV 96.0 03/14/2020    MCH 30.5 03/14/2020    MCHC 31.8 03/14/2020    RDW 15.6 03/14/2020     03/14/2020    MPV 11.1 03/14/2020     CMP:    Lab Results   Component Value Date     03/14/2020    K 5.3 03/14/2020     03/14/2020    CO2 20 03/14/2020 BUN 59 03/14/2020    CREATININE 3.8 03/14/2020    GFRAA 14 03/14/2020    LABGLOM 12 03/14/2020    GLUCOSE 117 03/14/2020    PROT 6.2 03/14/2020    LABALBU 3.4 03/14/2020    CALCIUM 8.8 03/14/2020    BILITOT 0.8 03/14/2020    ALKPHOS 100 03/14/2020    AST 1,380 03/14/2020     03/14/2020     Magnesium:    Lab Results   Component Value Date    MG 2.3 03/14/2020     Phosphorus:    Lab Results   Component Value Date    PHOS 6.5 03/14/2020     Radiology Review:      CT Abdomen and pelvis 3/14/20   1. No acute inflammatory changes omental mesenteric fat planes free   intraperitoneal air, bowel obstruction or ascites. .       2. Fluid contents in the colon can indicate impending diarrhea-like   condition. Chest x-ray March 13, 2020   Tortuous ectatic aorta   Cardiomegaly   Airspace disease compatible with pneumonia, at the right lung base                             IMPRESSION/RECOMMENDATIONS:      Briefly Mrs. Drea Bonds is a 22-year-old woman with history of HTN, COPD, alcohol abuse, who was admitted on March 13, 2020 transferred from [facility] where she initially presented with left leg weakness and was found to have creatinine level of 3.4 m/dL and a potassium level >7 mEq/L. In view of the lack of vascular surgeon available this week and the consideration of need of dialysis with possible need of dialysis catheter placement patient was transferred to this hospital.  Prior to admission patient reports having nausea vomiting and diarrhea. She denies taking NSAIDs. Her medications prior to admission included spironolactone and potassium supplementation. 1. KAYLA stage III, volume responsive prerenal KAYLA (GI losses, spironolactone) versus established ATN  2. Hyperkalemia, multifactorial due to KAYLA, potassium intake and spironolactone. 3. High anion gap metabolic acidosis (renal failure)  4. HTN, on metoprolol  5. New onset AF, on metoprolol, diltiazem drip  6.  Right lung pneumonia, on

## 2020-03-14 NOTE — PLAN OF CARE
Problem: Restraint Use - Nonviolent/Non-Self-Destructive Behavior:  Goal: Absence of restraint indications  Description: Absence of restraint indications  Outcome: Not Met This Shift  Goal: Absence of restraint-related injury  Description: Absence of restraint-related injury  Outcome: Met This Shift     Problem: Falls - Risk of:  Goal: Will remain free from falls  Description: Will remain free from falls  Outcome: Met This Shift  Goal: Absence of physical injury  Description: Absence of physical injury  Outcome: Met This Shift

## 2020-03-14 NOTE — CONSULTS
[facility]  Internal Medicine Residency Program  Consult MICU    Patient:  Shankar Lara 68 y.o. female MRN: [id number]     Date of Service: 3/14/2020    Hospital Day: 2      Chief complaint: Transferred for heart catheter placement  History of Present Illness   The patient is a 68 y.o. female with PMH of COPD hypertension CKD was transferred to [facility] for establishment of hemodialysis access for treatment of hyperkalemia. Spoke with patient's significant other last night. He states that for the past month the patient has not been feeling well. Carol Ngo He also states a 2-day history of nausea and vomiting with poor oral intake. Patient drinks approximately 3 alcoholic beverages nightly. Patient also began having left leg weakness 24 hours prior to admission. Patient was brought into the ED at [facility] and found to have a potassium >7. No faculty were able of establishing hacess for hemodialysis at that time and the patient was transferred to [facility] MICU in [facility] ([place] Past Medical History:  No past medical history on file. Past Surgical History:    No past surgical history on file. Medications Prior to Admission:    Prior to Admission medications    Medication Sig Start Date End Date Taking? Authorizing Provider   dilTIAZem (DILACOR XR) 180 MG extended release capsule Take 180 mg by mouth daily 2 CAP PO DAILY   Yes Historical Provider, MD   spironolactone (ALDACTONE) 25 MG tablet Take 25 mg by mouth daily HALF TAB   Yes Historical Provider, MD   triamcinolone (KENALOG) 0.025 % cream Apply topically 2 times daily.  9/3/19  Yes Jorge Murray APRN - CNP   metoprolol succinate (TOPROL XL) 100 MG extended release tablet TAKE ONE TABLET BY MOUTH EVERY DAY 7/8/19  Yes Historical Provider, MD   VENTOLIN  (90 Base) MCG/ACT inhaler INHALE 2 PUFFS EVERY 4 HOURS AS NEEDED FOR SHORTNESS OF BREATH 7/8/19  Yes Historical Provider, MD   potassium citrate (UROCIT-K) has normal size. Calcifications are seen in the coronary arteries. The liver has normal size and density. The gallbladder is well distended and appears unremarkable. The biliary tree and pancreatic ductal systems are not dilated. The pancreas has some atrophy otherwise appear unremarkable. The spleen has unremarkable appearance. There is an asymmetry of the size of the kidneys. Calcifications the left kidney are at least in part related with calcification of the renal arteries are. If one represent a renal calculus who be of small size in causing no obstruction. Also a calcification of the right kidney appears to be more related with a renal artery. There is no obstructive uropathy on the right on the left. Calcified atheroma changes seen in the abdominal aorta with prominent calcified plaque towards the lumen of the aorta in the upper segment including the origin of for the superior mesenteric artery. This study is done without IV contrast. No aneurysm of the abdominal aorta seen. There is no indication for bowel obstruction. There are some fluid contents in the left-sided colon which can indicate impending diarrhea-like condition to be correlate clinically. The There is no pericolonic inflammatory process. The appendix seen and has unremarkable appearance. The There are unremarkable appearance for the uterus and ovaries are. The bladder has a Kaufman catheter. Degenerative changes are seen in the lumbar spine. Old compression deformity seen in L1 vertebral body. 1. No acute inflammatory changes omental mesenteric fat planes free intraperitoneal air, bowel obstruction or ascites. . 2. Fluid contents in the colon can indicate impending diarrhea-like condition. Xr Chest Portable    Result Date: 3/14/2020  Patient MRN: [id number] : 1950 Age:  68 years Gender: Female Order Date: 3/13/2020 9:45 PM Exam: XR CHEST PORTABLE Number of Images: 1 view Indication:   SOB SOB Comparison: None. Findings:  The heart is enlarged. The lung fields demonstrate evidence for airspace disease. The aorta is tortuous and ectatic. Tortuous ectatic aorta Cardiomegaly Airspace disease compatible with pneumonia, at the right lung base          Resident's Assessment and Plan     Neurologic  Acute metabolic encephalopathy of unclear etiology (resolved)   Normal ammonia   ABG negative for hypercapnia   Elevated TSH with normal T4   A a OX3 today    Cardiovascular  A. fib   New onset   Responds well to Lopressor   Restarted home diltiazem   Continue Lopressor every 6 hours   Chads vas >2 but currently GI bleed   We will hold anticoagulation at this time  Hypertensive emergency   Initial /90   Remains hypertensive   Map goal no less than 115 for 24 hours: End time 9 PM   Continue Lopressor every 6 hours with holding parameter for map <120  Heart failure? · History of alcoholism  · Possible dilated cardiomyopathy  · proBNP 49,000  · Follow-up complete echo  Prolonged QTc   · Avoid QT prolonging meds    Pulmonary  Pleural effusion most likely 2/2 CHF   CXR initially concerning for possible pneumonia   CT abdomen shows right-sided pleural effusion  COPD exacerbation?  COPD on home oxygen at 2 L   Diffuse bilateral wheezing   Started on Brovana, Pulmicort, duo nebs and Solu-Medrol 40 every 6 H     Gastrointestinal  Transaminitis most likely 2/2 alcoholic hepatitis    AST 1300    Acetaminophen levels within normal limits   Awaiting urine and serum drug screening   CT abdomen unremarkable   Follow-up CMV EBV DELANO   Follow-up hepatitis panel   Continue to trend LFTs   CT abdomen unremarkable for acute intra-abdominal pathologies   Follow-up CK  Pancreatitis?  Currently n.p.o.    Was on fluids but stopped due to possible CHF exacerbation   Lipase 492   Abdominal pain in the upper left quadrant   History of alcoholism  Upper GI bleed  · Melanotic stools  · FOBT positive  · General surgery consulted  · We will transfuse if hemoglobin <7  · Continue Protonix BID   Alcoholism  · Folate B12 within normal limits  · Has had history of withdrawal in the past  · Given age [de-identified] not in place  · Will most likely need Precedex if patient becomes symptomatic use benzodiazepines sparingly    Infectious Disease  1. DX   WBC 14.5   Procalcitonin 0.92   Respiratory panel negative   Follow-up respiratory culture   Received 1 dose of Unasyn    Genitourinary/Renal  KAYLA most likely prerenal   CR from approximately 5 days ago 1.2   Initial CR 3.4   Currently up trending   Follow-up urine studies   Minimal urine output    Nephrology consulted   Continue to monitor renal function  Hyperkalemia 2/2 oral potassium, spironolactone and dehydration   Hold home potassium and spironolactone   Initial EKG showed peaked T waves   Received calcium gluconate, insulin D50 albuterol and Kayexalate   We will continue to monitor potassium  High anion gap metabolic acidosis   · Normal lactic acid  · Minimal increase in serum osmolar gap    Hematology/Oncology  Normocytic anemia   Low iron and iron saturation with normal ferritin   Normal folate and B12     Thrombocytopenia   INR 1.4 APTT 27.5    DVT prophylaxis/ GI prophylaxis: SCDs/Protonix  Disposition: MICU    Omero Head MD, PGY-1   Attending physician: Dr. Neville Guerin ICU Attending Statement     Master Rosenthal was seen, examined and discussed with the multi-disciplinary ICU team during rounds. A addendum note may be separate to the residents note. If not then, I have personally seen and examined the patient and the key elements of the encounter were performed by me (> 85 % time). The medications & laboratory data was discussed and adjusted where necessary. The radiographic images were reviewed either as a group or with radiologist.  Any changes are document or changed if felt dis-concordant with the exam or history.  The above findings were corroborated, plans confirmed and changes made if needed. Family was updated at the bedside as available. Key issues of the case were discussed among consultants. Critical Care time is documented if appropriate.       Sonya Javed DO, MPH  Professor of Medicine

## 2020-03-14 NOTE — PROGRESS NOTES
03/13/20 2330   NIV Type   Skin Assessment Clean, dry, & intact   Skin Protection for O2 Device Yes   Equipment Type v60   Mode Bilevel   Mask Type Full face mask   Mask Size Small   Bonnet size Small   Settings/Measurements   IPAP 15 cmH20   CPAP/EPAP 8 cmH2O   Resp 19   Insp Rise Time (%) 3 %   FiO2  50 %   I Time/ I Time % 0.85 s   Vt Exhaled 599 mL   Minute Volume 11 Liters   Mask Leak (lpm) 24 lpm   Comfort Level Good   Using Accessory Muscles No   Date: 3/13/2020    Time: 11:32 PM    BIPAP RECHECK. Facial area red/color change? No           If YES are Blister/Lesion present? No   If yes must notify nursing staff  BIPAP/CPAP skin barrier?   Yes    Skin barrier type:duoderm       Comments:        Blas Cherry

## 2020-03-15 ENCOUNTER — ANESTHESIA (OUTPATIENT)
Dept: ENDOSCOPY | Age: 70
DRG: 673 | End: 2020-03-15
Payer: COMMERCIAL

## 2020-03-15 ENCOUNTER — ANESTHESIA EVENT (OUTPATIENT)
Dept: ENDOSCOPY | Age: 70
DRG: 673 | End: 2020-03-15
Payer: COMMERCIAL

## 2020-03-15 VITALS
RESPIRATION RATE: 16 BRPM | OXYGEN SATURATION: 94 % | SYSTOLIC BLOOD PRESSURE: 131 MMHG | DIASTOLIC BLOOD PRESSURE: 64 MMHG

## 2020-03-15 LAB
ACETAMINOPHEN LEVEL: <5 MCG/ML (ref 10–30)
ALBUMIN SERPL-MCNC: 3.2 G/DL (ref 3.5–5.2)
ALP BLD-CCNC: 77 U/L (ref 35–104)
ALT SERPL-CCNC: 590 U/L (ref 0–32)
ANION GAP SERPL CALCULATED.3IONS-SCNC: 19 MMOL/L (ref 7–16)
ANION GAP SERPL CALCULATED.3IONS-SCNC: 20 MMOL/L (ref 7–16)
ANION GAP SERPL CALCULATED.3IONS-SCNC: 21 MMOL/L (ref 7–16)
ANISOCYTOSIS: ABNORMAL
AST SERPL-CCNC: 504 U/L (ref 0–31)
BASOPHILS ABSOLUTE: 0 E9/L (ref 0–0.2)
BASOPHILS ABSOLUTE: 0.03 E9/L (ref 0–0.2)
BASOPHILS RELATIVE PERCENT: 0.1 % (ref 0–2)
BASOPHILS RELATIVE PERCENT: 0.2 % (ref 0–2)
BILIRUB SERPL-MCNC: 0.7 MG/DL (ref 0–1.2)
BUN BLDV-MCNC: 72 MG/DL (ref 8–23)
CALCIUM IONIZED: 1.01 MMOL/L (ref 1.15–1.33)
CALCIUM SERPL-MCNC: 7.6 MG/DL (ref 8.6–10.2)
CALCIUM SERPL-MCNC: 7.8 MG/DL (ref 8.6–10.2)
CALCIUM SERPL-MCNC: 7.9 MG/DL (ref 8.6–10.2)
CHLORIDE BLD-SCNC: 92 MMOL/L (ref 98–107)
CHLORIDE BLD-SCNC: 92 MMOL/L (ref 98–107)
CHLORIDE BLD-SCNC: 95 MMOL/L (ref 98–107)
CO2: 23 MMOL/L (ref 22–29)
CO2: 24 MMOL/L (ref 22–29)
CO2: 24 MMOL/L (ref 22–29)
CREAT SERPL-MCNC: 4.5 MG/DL (ref 0.5–1)
CREAT SERPL-MCNC: 4.7 MG/DL (ref 0.5–1)
CREAT SERPL-MCNC: 4.7 MG/DL (ref 0.5–1)
EOSINOPHILS ABSOLUTE: 0 E9/L (ref 0.05–0.5)
EOSINOPHILS ABSOLUTE: 0 E9/L (ref 0.05–0.5)
EOSINOPHILS RELATIVE PERCENT: 0 % (ref 0–6)
EOSINOPHILS RELATIVE PERCENT: 0 % (ref 0–6)
GFR AFRICAN AMERICAN: 11
GFR AFRICAN AMERICAN: 11
GFR AFRICAN AMERICAN: 12
GFR NON-AFRICAN AMERICAN: 10 ML/MIN/1.73
GFR NON-AFRICAN AMERICAN: 9 ML/MIN/1.73
GFR NON-AFRICAN AMERICAN: 9 ML/MIN/1.73
GLUCOSE BLD-MCNC: 149 MG/DL (ref 74–99)
GLUCOSE BLD-MCNC: 151 MG/DL (ref 74–99)
GLUCOSE BLD-MCNC: 152 MG/DL (ref 74–99)
HCT VFR BLD CALC: 30.8 % (ref 34–48)
HCT VFR BLD CALC: 31.6 % (ref 34–48)
HCT VFR BLD CALC: 31.7 % (ref 34–48)
HCT VFR BLD CALC: 33.2 % (ref 34–48)
HEMOGLOBIN: 10 G/DL (ref 11.5–15.5)
HEMOGLOBIN: 10.3 G/DL (ref 11.5–15.5)
HEMOGLOBIN: 9.6 G/DL (ref 11.5–15.5)
HEMOGLOBIN: 9.9 G/DL (ref 11.5–15.5)
HYPOCHROMIA: ABNORMAL
IMMATURE GRANULOCYTES #: 0.2 E9/L
IMMATURE GRANULOCYTES %: 1.4 % (ref 0–5)
LIPASE: 137 U/L (ref 13–60)
LYMPHOCYTES ABSOLUTE: 0.28 E9/L (ref 1.5–4)
LYMPHOCYTES ABSOLUTE: 0.29 E9/L (ref 1.5–4)
LYMPHOCYTES RELATIVE PERCENT: 1.7 % (ref 20–42)
LYMPHOCYTES RELATIVE PERCENT: 2 % (ref 20–42)
MAGNESIUM: 2.5 MG/DL (ref 1.6–2.6)
MCH RBC QN AUTO: 30.1 PG (ref 26–35)
MCH RBC QN AUTO: 30.2 PG (ref 26–35)
MCHC RBC AUTO-ENTMCNC: 31.2 % (ref 32–34.5)
MCHC RBC AUTO-ENTMCNC: 31.6 % (ref 32–34.5)
MCV RBC AUTO: 95.2 FL (ref 80–99.9)
MCV RBC AUTO: 96.6 FL (ref 80–99.9)
METER GLUCOSE: 143 MG/DL (ref 74–99)
MONOCYTES ABSOLUTE: 0.86 E9/L (ref 0.1–0.95)
MONOCYTES ABSOLUTE: 0.95 E9/L (ref 0.1–0.95)
MONOCYTES RELATIVE PERCENT: 6.1 % (ref 2–12)
MONOCYTES RELATIVE PERCENT: 6.7 % (ref 2–12)
MRSA CULTURE ONLY: NORMAL
NEUTROPHILS ABSOLUTE: 12.71 E9/L (ref 1.8–7.3)
NEUTROPHILS ABSOLUTE: 13.25 E9/L (ref 1.8–7.3)
NEUTROPHILS RELATIVE PERCENT: 89.7 % (ref 43–80)
NEUTROPHILS RELATIVE PERCENT: 92.2 % (ref 43–80)
NUCLEATED RED BLOOD CELLS: 1.7 /100 WBC
OVALOCYTES: ABNORMAL
OVALOCYTES: ABNORMAL
PDW BLD-RTO: 15.7 FL (ref 11.5–15)
PDW BLD-RTO: 15.9 FL (ref 11.5–15)
PHOSPHORUS: 7.3 MG/DL (ref 2.5–4.5)
PLATELET # BLD: 95 E9/L (ref 130–450)
PLATELET # BLD: 96 E9/L (ref 130–450)
PLATELET CONFIRMATION: NORMAL
PLATELET CONFIRMATION: NORMAL
PMV BLD AUTO: 10.8 FL (ref 7–12)
PMV BLD AUTO: 11 FL (ref 7–12)
POIKILOCYTES: ABNORMAL
POIKILOCYTES: ABNORMAL
POLYCHROMASIA: ABNORMAL
POLYCHROMASIA: ABNORMAL
POTASSIUM REFLEX MAGNESIUM: 4.8 MMOL/L (ref 3.5–5)
POTASSIUM SERPL-SCNC: 4.7 MMOL/L (ref 3.5–5)
POTASSIUM SERPL-SCNC: 6 MMOL/L (ref 3.5–5)
RBC # BLD: 3.28 E12/L (ref 3.5–5.5)
RBC # BLD: 3.32 E12/L (ref 3.5–5.5)
SCHISTOCYTES: ABNORMAL
SODIUM BLD-SCNC: 135 MMOL/L (ref 132–146)
SODIUM BLD-SCNC: 137 MMOL/L (ref 132–146)
SODIUM BLD-SCNC: 138 MMOL/L (ref 132–146)
TOTAL PROTEIN: 5.7 G/DL (ref 6.4–8.3)
WBC # BLD: 14.2 E9/L (ref 4.5–11.5)
WBC # BLD: 14.4 E9/L (ref 4.5–11.5)

## 2020-03-15 PROCEDURE — 3700000001 HC ADD 15 MINUTES (ANESTHESIA): Performed by: SURGERY

## 2020-03-15 PROCEDURE — 80307 DRUG TEST PRSMV CHEM ANLYZR: CPT

## 2020-03-15 PROCEDURE — 2580000003 HC RX 258: Performed by: NURSE ANESTHETIST, CERTIFIED REGISTERED

## 2020-03-15 PROCEDURE — 6370000000 HC RX 637 (ALT 250 FOR IP): Performed by: NURSE PRACTITIONER

## 2020-03-15 PROCEDURE — 6370000000 HC RX 637 (ALT 250 FOR IP): Performed by: INTERNAL MEDICINE

## 2020-03-15 PROCEDURE — 2500000003 HC RX 250 WO HCPCS: Performed by: INTERNAL MEDICINE

## 2020-03-15 PROCEDURE — 94640 AIRWAY INHALATION TREATMENT: CPT

## 2020-03-15 PROCEDURE — 6360000002 HC RX W HCPCS: Performed by: INTERNAL MEDICINE

## 2020-03-15 PROCEDURE — 80053 COMPREHEN METABOLIC PANEL: CPT

## 2020-03-15 PROCEDURE — 82962 GLUCOSE BLOOD TEST: CPT

## 2020-03-15 PROCEDURE — C9113 INJ PANTOPRAZOLE SODIUM, VIA: HCPCS | Performed by: INTERNAL MEDICINE

## 2020-03-15 PROCEDURE — 3609017100 HC EGD: Performed by: SURGERY

## 2020-03-15 PROCEDURE — 2580000003 HC RX 258: Performed by: INTERNAL MEDICINE

## 2020-03-15 PROCEDURE — 99233 SBSQ HOSP IP/OBS HIGH 50: CPT | Performed by: INTERNAL MEDICINE

## 2020-03-15 PROCEDURE — 85014 HEMATOCRIT: CPT

## 2020-03-15 PROCEDURE — 2709999900 HC NON-CHARGEABLE SUPPLY: Performed by: SURGERY

## 2020-03-15 PROCEDURE — 6360000002 HC RX W HCPCS: Performed by: NURSE ANESTHETIST, CERTIFIED REGISTERED

## 2020-03-15 PROCEDURE — 83690 ASSAY OF LIPASE: CPT

## 2020-03-15 PROCEDURE — 80048 BASIC METABOLIC PNL TOTAL CA: CPT

## 2020-03-15 PROCEDURE — 0DJ08ZZ INSPECTION OF UPPER INTESTINAL TRACT, VIA NATURAL OR ARTIFICIAL OPENING ENDOSCOPIC: ICD-10-PCS | Performed by: SURGERY

## 2020-03-15 PROCEDURE — 82103 ALPHA-1-ANTITRYPSIN TOTAL: CPT

## 2020-03-15 PROCEDURE — 2000000000 HC ICU R&B

## 2020-03-15 PROCEDURE — 82330 ASSAY OF CALCIUM: CPT

## 2020-03-15 PROCEDURE — 5A1D70Z PERFORMANCE OF URINARY FILTRATION, INTERMITTENT, LESS THAN 6 HOURS PER DAY: ICD-10-PCS | Performed by: INTERNAL MEDICINE

## 2020-03-15 PROCEDURE — 3700000000 HC ANESTHESIA ATTENDED CARE: Performed by: SURGERY

## 2020-03-15 PROCEDURE — 83735 ASSAY OF MAGNESIUM: CPT

## 2020-03-15 PROCEDURE — 36415 COLL VENOUS BLD VENIPUNCTURE: CPT

## 2020-03-15 PROCEDURE — 90935 HEMODIALYSIS ONE EVALUATION: CPT | Performed by: INTERNAL MEDICINE

## 2020-03-15 PROCEDURE — 85018 HEMOGLOBIN: CPT

## 2020-03-15 PROCEDURE — 85025 COMPLETE CBC W/AUTO DIFF WBC: CPT

## 2020-03-15 PROCEDURE — 2700000000 HC OXYGEN THERAPY PER DAY

## 2020-03-15 PROCEDURE — 84100 ASSAY OF PHOSPHORUS: CPT

## 2020-03-15 RX ORDER — HYDRALAZINE HYDROCHLORIDE 20 MG/ML
5 INJECTION INTRAMUSCULAR; INTRAVENOUS EVERY 4 HOURS PRN
Status: DISCONTINUED | OUTPATIENT
Start: 2020-03-15 | End: 2020-03-17

## 2020-03-15 RX ORDER — METHYLPREDNISOLONE SODIUM SUCCINATE 40 MG/ML
40 INJECTION, POWDER, LYOPHILIZED, FOR SOLUTION INTRAMUSCULAR; INTRAVENOUS EVERY 8 HOURS
Status: DISCONTINUED | OUTPATIENT
Start: 2020-03-15 | End: 2020-03-16

## 2020-03-15 RX ORDER — ANTICOAGULANT SODIUM CITRATE SOLUTION 4 G/100ML
2.6 SOLUTION INTRAVENOUS PRN
Status: DISCONTINUED | OUTPATIENT
Start: 2020-03-15 | End: 2020-03-26 | Stop reason: HOSPADM

## 2020-03-15 RX ORDER — DIGOXIN 0.25 MG/ML
250 INJECTION INTRAMUSCULAR; INTRAVENOUS ONCE
Status: DISCONTINUED | OUTPATIENT
Start: 2020-03-15 | End: 2020-03-15

## 2020-03-15 RX ORDER — HYDRALAZINE HYDROCHLORIDE 25 MG/1
25 TABLET, FILM COATED ORAL EVERY 8 HOURS SCHEDULED
Status: DISCONTINUED | OUTPATIENT
Start: 2020-03-15 | End: 2020-03-16

## 2020-03-15 RX ORDER — PROPOFOL 10 MG/ML
INJECTION, EMULSION INTRAVENOUS PRN
Status: DISCONTINUED | OUTPATIENT
Start: 2020-03-15 | End: 2020-03-15 | Stop reason: SDUPTHER

## 2020-03-15 RX ORDER — SODIUM CHLORIDE 9 MG/ML
INJECTION, SOLUTION INTRAVENOUS CONTINUOUS PRN
Status: DISCONTINUED | OUTPATIENT
Start: 2020-03-15 | End: 2020-03-15 | Stop reason: SDUPTHER

## 2020-03-15 RX ORDER — DEXTROSE AND SODIUM CHLORIDE 5; .9 G/100ML; G/100ML
INJECTION, SOLUTION INTRAVENOUS CONTINUOUS
Status: DISCONTINUED | OUTPATIENT
Start: 2020-03-15 | End: 2020-03-22

## 2020-03-15 RX ADMIN — ARFORMOTEROL TARTRATE 15 MCG: 15 SOLUTION RESPIRATORY (INHALATION) at 08:58

## 2020-03-15 RX ADMIN — DEXTROSE AND SODIUM CHLORIDE: 5; 900 INJECTION, SOLUTION INTRAVENOUS at 10:29

## 2020-03-15 RX ADMIN — IPRATROPIUM BROMIDE AND ALBUTEROL SULFATE 1 AMPULE: 2.5; .5 SOLUTION RESPIRATORY (INHALATION) at 16:21

## 2020-03-15 RX ADMIN — METOPROLOL TARTRATE 5 MG: 5 INJECTION INTRAVENOUS at 09:14

## 2020-03-15 RX ADMIN — DEXTROSE MONOHYDRATE 10 MG/HR: 50 INJECTION, SOLUTION INTRAVENOUS at 01:07

## 2020-03-15 RX ADMIN — METOPROLOL TARTRATE 5 MG: 5 INJECTION INTRAVENOUS at 03:30

## 2020-03-15 RX ADMIN — METOPROLOL TARTRATE 5 MG: 5 INJECTION INTRAVENOUS at 20:57

## 2020-03-15 RX ADMIN — BUDESONIDE 250 MCG: 0.25 SUSPENSION RESPIRATORY (INHALATION) at 21:27

## 2020-03-15 RX ADMIN — SODIUM CHLORIDE, PRESERVATIVE FREE 10 ML: 5 INJECTION INTRAVENOUS at 21:08

## 2020-03-15 RX ADMIN — SODIUM BICARBONATE: 84 INJECTION, SOLUTION INTRAVENOUS at 04:47

## 2020-03-15 RX ADMIN — HYDRALAZINE HYDROCHLORIDE 5 MG: 20 INJECTION INTRAMUSCULAR; INTRAVENOUS at 11:36

## 2020-03-15 RX ADMIN — CALCIUM GLUCONATE 2 G: 98 INJECTION, SOLUTION INTRAVENOUS at 05:21

## 2020-03-15 RX ADMIN — ANTICOAGULANT SODIUM CITRATE SOLUTION 0.1 G: 4 SOLUTION INTRAVENOUS at 12:24

## 2020-03-15 RX ADMIN — FERROUS SULFATE TAB 325 MG (65 MG ELEMENTAL FE) 325 MG: 325 (65 FE) TAB at 17:12

## 2020-03-15 RX ADMIN — PANTOPRAZOLE SODIUM 40 MG: 40 INJECTION, POWDER, FOR SOLUTION INTRAVENOUS at 19:51

## 2020-03-15 RX ADMIN — HYDRALAZINE HYDROCHLORIDE 25 MG: 25 TABLET, FILM COATED ORAL at 15:07

## 2020-03-15 RX ADMIN — AMPICILLIN SODIUM AND SULBACTAM SODIUM 3 G: 2; 1 INJECTION, POWDER, FOR SOLUTION INTRAMUSCULAR; INTRAVENOUS at 01:00

## 2020-03-15 RX ADMIN — METOPROLOL TARTRATE 5 MG: 5 INJECTION INTRAVENOUS at 15:07

## 2020-03-15 RX ADMIN — ARFORMOTEROL TARTRATE 15 MCG: 15 SOLUTION RESPIRATORY (INHALATION) at 21:27

## 2020-03-15 RX ADMIN — METHYLPREDNISOLONE SODIUM SUCCINATE 40 MG: 40 INJECTION, POWDER, FOR SOLUTION INTRAMUSCULAR; INTRAVENOUS at 09:38

## 2020-03-15 RX ADMIN — SODIUM CHLORIDE: 9 INJECTION, SOLUTION INTRAVENOUS at 13:04

## 2020-03-15 RX ADMIN — SODIUM CHLORIDE, PRESERVATIVE FREE 10 ML: 5 INJECTION INTRAVENOUS at 19:51

## 2020-03-15 RX ADMIN — HYDRALAZINE HYDROCHLORIDE 25 MG: 25 TABLET, FILM COATED ORAL at 22:22

## 2020-03-15 RX ADMIN — IPRATROPIUM BROMIDE AND ALBUTEROL SULFATE 1 AMPULE: 2.5; .5 SOLUTION RESPIRATORY (INHALATION) at 08:59

## 2020-03-15 RX ADMIN — IPRATROPIUM BROMIDE AND ALBUTEROL SULFATE 1 AMPULE: 2.5; .5 SOLUTION RESPIRATORY (INHALATION) at 21:28

## 2020-03-15 RX ADMIN — IPRATROPIUM BROMIDE AND ALBUTEROL SULFATE 1 AMPULE: 2.5; .5 SOLUTION RESPIRATORY (INHALATION) at 11:53

## 2020-03-15 RX ADMIN — PROPOFOL 130 MG: 10 INJECTION, EMULSION INTRAVENOUS at 13:16

## 2020-03-15 RX ADMIN — BUDESONIDE 250 MCG: 0.25 SUSPENSION RESPIRATORY (INHALATION) at 08:59

## 2020-03-15 RX ADMIN — FERROUS SULFATE TAB 325 MG (65 MG ELEMENTAL FE) 325 MG: 325 (65 FE) TAB at 08:08

## 2020-03-15 RX ADMIN — METOPROLOL SUCCINATE 50 MG: 50 TABLET, EXTENDED RELEASE ORAL at 19:51

## 2020-03-15 RX ADMIN — SODIUM CHLORIDE, PRESERVATIVE FREE 10 ML: 5 INJECTION INTRAVENOUS at 08:08

## 2020-03-15 RX ADMIN — PANTOPRAZOLE SODIUM 40 MG: 40 INJECTION, POWDER, FOR SOLUTION INTRAVENOUS at 08:08

## 2020-03-15 RX ADMIN — METOPROLOL SUCCINATE 50 MG: 50 TABLET, EXTENDED RELEASE ORAL at 08:08

## 2020-03-15 RX ADMIN — METHYLPREDNISOLONE SODIUM SUCCINATE 40 MG: 40 INJECTION, POWDER, FOR SOLUTION INTRAMUSCULAR; INTRAVENOUS at 17:12

## 2020-03-15 RX ADMIN — FOLIC ACID 1 MG: 5 INJECTION, SOLUTION INTRAMUSCULAR; INTRAVENOUS; SUBCUTANEOUS at 08:20

## 2020-03-15 RX ADMIN — METHYLPREDNISOLONE SODIUM SUCCINATE 40 MG: 40 INJECTION, POWDER, FOR SOLUTION INTRAMUSCULAR; INTRAVENOUS at 04:08

## 2020-03-15 RX ADMIN — THIAMINE HYDROCHLORIDE 100 MG: 100 INJECTION, SOLUTION INTRAMUSCULAR; INTRAVENOUS at 08:07

## 2020-03-15 ASSESSMENT — PAIN SCALES - GENERAL
PAINLEVEL_OUTOF10: 0

## 2020-03-15 ASSESSMENT — LIFESTYLE VARIABLES: SMOKING_STATUS: 1

## 2020-03-15 NOTE — CONSULTS
Vascular Surgery Consultation Note    Reason for Consult:  HD catheter placement    HPI :    Klaus Mcgill is a 68 y.o. female who was sent in the [facility] for evaluation of left lower extremity weakness upon arrival she was found to be bradycardic in the 30s . She has a history of end-stage COPD was recently placed on oxygen and given prednisone. During that admission she was found to be hyperkalemic with a potassium greater than 7 and went into A. fib with RVR. She was transferred here for evaluation and possible hemodialysis. She is on home spironolactone and oral potassium which is been held. She denies any blood thinners. Vascular surgery was consulted for HD catheter placement. She denies any prior abdominal surgeries. She currently denies any abdominal pain. She denies fevers chills chest pain. She wears BiPAP overnight and is currently on 6 L nasal cannula  ROS : Negative if blank [], Positive if [x]  General Vascular   [] Fevers [] Claudication (Blocks)   [] Chills [] Rest Pain   [] Weight Loss [] Tissue Loss   [] Chest Pain [] Clotting Disorder    [x] SOB at rest [] Leg Swelling   [x] SOB with exertion [] DVT/PE      [] Nausea    [] Vomitting [] Stroke/TIA   [] Abdominal Pain [] Focal weakness   [] Melena [] Slurred Speech   [] Hematochezia [] Vision Changes   [x] Hematuria    [] Dysuria [] Hx of Central Catheters   [] Wears Glasses/Contacts  [] Dialysis and If so date initiated   [] Blindness     [x] Right Hand Dominant   [] Difficulty swallowing        No past medical history on file. No past surgical history on file.   Current Medications:    sodium chloride 75 mL/hr at 03/14/20 0138    IV infusion builder 75 mL/hr at 03/14/20 1450    dilTIAZem 5 mg/hr (03/14/20 1132)    dextrose      dextrose        sodium chloride flush, acetaminophen **OR** acetaminophen, polyethylene glycol, promethazine **OR** ondansetron, glucose, dextrose, glucagon (rDNA), dextrose, glucose, dextrose, glucagon (rDNA), dextrose, perflutren lipid microspheres    pantoprazole  40 mg Intravenous BID    And    sodium chloride (PF)  10 mL Intravenous BID    metoprolol  5 mg Intravenous Q6H    methylPREDNISolone  40 mg Intravenous Q6H    [START ON 3/15/2020] ampicillin-sulbactam  3 g Intravenous Q24H    ferrous sulfate  325 mg Oral BID WC    metoprolol succinate  50 mg Oral BID    sodium chloride flush  10 mL Intravenous 2 times per day    ipratropium-albuterol  1 ampule Inhalation Q4H WA    Arformoterol Tartrate  15 mcg Nebulization BID    budesonide  250 mcg Nebulization BID    dextrose  25 g Intravenous Once    sodium polystyrene  15 g Oral Once    thiamine  100 mg Intravenous Daily    folic acid  1 mg Intravenous Daily        Allergies:  Patient has no known allergies.     Social History     Socioeconomic History    Marital status: Single     Spouse name: Not on file    Number of children: Not on file    Years of education: Not on file    Highest education level: Not on file   Occupational History    Not on file   Social Needs    Financial resource strain: Not on file    Food insecurity     Worry: Not on file     Inability: Not on file    Transportation needs     Medical: Not on file     Non-medical: Not on file   Tobacco Use    Smoking status: Current Every Day Smoker     Packs/day: 1.00     Types: Cigarettes   Substance and Sexual Activity    Alcohol use: Yes     Frequency: 2-3 times a week     Drinks per session: Patient refused     Binge frequency: Patient refused    Drug use: Not Currently     Types: Marijuana    Sexual activity: Not on file   Lifestyle    Physical activity     Days per week: Not on file     Minutes per session: Not on file    Stress: Not on file   Relationships    Social connections     Talks on phone: Not on file     Gets together: Not on file     Attends Hoahaoism service: Not on file     Active member of club or organization: Not on file     Attends meetings of clubs or organizations: Not on file     Relationship status: Not on file    Intimate partner violence     Fear of current or ex partner: Not on file     Emotionally abused: Not on file     Physically abused: Not on file     Forced sexual activity: Not on file   Other Topics Concern    Not on file   Social History Narrative    Not on file        No family history on file. PHYSICAL EXAM:    BP (!) 145/93   Pulse 138   Temp 98.6 °F (37 °C) (Oral)   Resp 20   Wt 97 lb (44 kg)   SpO2 97%   BMI 18.33 kg/m²      GENERAL:  NAD. A&Ox3. HEAD:  Normocephalic. Atraumatic. EYES:   No scleral icterus. PERRL. LUNGS: On 6 L nasal cannula. Was on BiPAP overnight. CARDIOVASCULAR: A. fib with RVR  ABDOMEN:  Soft, non-distended, non-tender. No guarding, rigidity, rebound. EXTREMITIES:   No lower extremity edema  SKIN:  Warm and dry    R posterior tibial 1+ L posterior tibial 1+   R dorsalis pedis 2+ L dorsalis pedis 2+     LABS:    Lab Results   Component Value Date    WBC 14.5 (H) 03/14/2020    HGB 10.4 (L) 03/14/2020    HCT 33.9 (L) 03/14/2020     (L) 03/14/2020    PROTIME 15.4 (H) 03/13/2020    INR 1.4 03/13/2020    K 5.1 (H) 03/14/2020    BUN 68 (H) 03/14/2020    CREATININE 4.5 (H) 03/14/2020       RADIOLOGY:    Assesment/Plan  KAYLA with hyperkalemia requiring hemodialysis     Placement of HD catheter. Risks discussed with patient. Discussed with Dr. Hubert Denver.      Terence Son

## 2020-03-15 NOTE — ANESTHESIA PRE PROCEDURE
injection 40 mg  40 mg Intravenous Q8H Deni Guerrero MD        hydrALAZINE (APRESOLINE) tablet 25 mg  25 mg Oral 3 times per day Lázaro Dsouza MD        pantoprazole (PROTONIX) injection 40 mg  40 mg Intravenous BID Deni Guerrero MD   40 mg at 03/15/20 4379    And    sodium chloride (PF) 0.9 % injection 10 mL  10 mL Intravenous BID Deni Guerrero MD   10 mL at 03/15/20 0808    metoprolol (LOPRESSOR) injection 5 mg  5 mg Intravenous Q6H Deni Guerrero MD   5 mg at 03/15/20 0914    ampicillin-sulbactam (UNASYN) 3 g ivpb minibag  3 g Intravenous Q24H Deni Guerrero MD   Stopped at 03/15/20 0135    ferrous sulfate (IRON 325) tablet 325 mg  325 mg Oral BID WC Deni Guerrero MD   325 mg at 03/15/20 2520    metoprolol succinate (TOPROL XL) extended release tablet 50 mg  50 mg Oral BID [facility], APRN - CNP   50 mg at 03/15/20 4464    sodium chloride flush 0.9 % injection 10 mL  10 mL Intravenous 2 times per day Dash Mai MD   10 mL at 03/15/20 0808    sodium chloride flush 0.9 % injection 10 mL  10 mL Intravenous PRN Dash Mai MD        acetaminophen (TYLENOL) tablet 650 mg  650 mg Oral Q6H PRN Dash Mai MD        Or   [facility] acetaminophen (TYLENOL) suppository 650 mg  650 mg Rectal Q6H PRN Dash Mai MD        polyethylene glycol [facility]) packet 17 g  17 g Oral Daily PRN Dash Mai MD        promethazine (PHENERGAN) tablet 12.5 mg  12.5 mg Oral Q6H PRN Dash Mai MD   12.5 mg at 03/14/20 0143    Or    ondansetron (ZOFRAN) injection 4 mg  4 mg Intravenous Q6H PRN Dash Mai MD        ipratropium-albuterol (DUONEB) nebulizer solution 1 ampule  1 ampule Inhalation Q4H WA Dash Mai MD   1 ampule at 03/15/20 1153    Arformoterol Tartrate (BROVANA) nebulizer solution 15 mcg  15 mcg Nebulization BID Dash Mai MD   15 mcg at 03/15/20 0836    budesonide (PULMICORT) nebulizer suspension 250 mcg  250 mcg Nebulization BID Bishop Odom MD   250 mcg at 03/15/20 0859    dextrose 50 % IV solution  25 g Intravenous Once Greg Wick MD        glucose (GLUTOSE) 40 % oral gel 15 g  15 g Oral PRN Greg Wick, MD        dextrose 50 % IV solution  12.5 g Intravenous PRN Greg Wick MD        glucagon (rDNA) injection 1 mg  1 mg Intramuscular PRN Greg Wick MD        dextrose 5 % solution  100 mL/hr Intravenous PRN Greg Wick MD        sodium polystyrene (KAYEXALATE) 15 GM/60ML suspension 15 g  15 g Oral Once Greg Wick MD        glucose (GLUTOSE) 40 % oral gel 15 g  15 g Oral PRN Greg Wick, MD        dextrose 50 % IV solution  12.5 g Intravenous PRN Greg Wick MD        glucagon (rDNA) injection 1 mg  1 mg Intramuscular PRN Greg Wick MD        dextrose 5 % solution  100 mL/hr Intravenous PRN Greg Wick MD        perflutren lipid microspheres (DEFINITY) injection 1.65 mg  1.5 mL Intravenous ONCE PRN Bishop Odom MD        thiamine (B-1) injection 100 mg  100 mg Intravenous Daily Bishop Odom MD   100 mg at 23/83/61 7923    folic acid injection 1 mg  1 mg Intravenous Daily Bishop Odom MD   1 mg at 03/15/20 0820       Allergies:  No Known Allergies    Problem List:    Patient Active Problem List   Diagnosis Code    Hyperkalemia E87.5    Atrial fibrillation ([facility]) I48.91    Acute renal insufficiency N28.9    Chronic heart failure with preserved ejection fraction (HFpEF) ([facility]) I50.32    Acute and chr resp failure, unsp w hypoxia or hypercapnia ([facility] [address] J96.20    Essential hypertension I10       Past Medical History:  No past medical history on file. Past Surgical History:  No past surgical history on file.     Social History:    Social History     Tobacco Use    Smoking status: Current Every Day Smoker     Packs/day: 1.00     Types: Cigarettes   Substance Use Topics    Alcohol use: Yes     Frequency: 2-3 times a week     Drinks per session: Patient refused     Binge frequency: Patient refused                                Ready to quit: No  Counseling given: No      Vital Signs (Current):   Vitals:    03/15/20 1142 03/15/20 1159 03/15/20 1200 03/15/20 1215   BP: (!) 204/87 (!) 179/79 (!) 175/91 (!) 160/108   Pulse: 76 66 62 65   Resp:   16    Temp:       TempSrc:       SpO2:   100%    Weight:                                                  BP Readings from Last 3 Encounters:   03/15/20 (!) 160/108   09/03/19 138/88   08/01/19 132/74       NPO Status: Time of last liquid consumption: 0000                        Time of last solid consumption: 0000                        Date of last liquid consumption: 03/15/20                        Date of last solid food consumption: 03/15/20    BMI:   Wt Readings from Last 3 Encounters:   03/15/20 97 lb 3.6 oz (44.1 kg)   09/03/19 89 lb (40.4 kg)   08/01/19 90 lb (40.8 kg)     Body mass index is 18.37 kg/m². CBC:   Lab Results   Component Value Date    WBC 14.2 03/15/2020    RBC 3.28 03/15/2020    HGB 9.9 03/15/2020    HCT 31.7 03/15/2020    MCV 96.6 03/15/2020    RDW 15.9 03/15/2020    PLT 96 03/15/2020       CMP:   Lab Results   Component Value Date     03/15/2020    K 4.7 03/15/2020    K 4.8 03/15/2020    CL 92 03/15/2020    CO2 24 03/15/2020    BUN 72 03/15/2020    CREATININE 4.7 03/15/2020    GFRAA 11 03/15/2020    LABGLOM 9 03/15/2020    GLUCOSE 152 03/15/2020    PROT 5.7 03/15/2020    CALCIUM 7.9 03/15/2020    BILITOT 0.7 03/15/2020    ALKPHOS 77 03/15/2020     03/15/2020     03/15/2020       POC Tests: No results for input(s): POCGLU, POCNA, POCK, POCCL, POCBUN, POCHEMO, POCHCT in the last 72 hours.     Coags:   Lab Results   Component Value Date    PROTIME 15.4 03/13/2020    INR 1.4 03/13/2020    APTT 27.5 03/13/2020       HCG (If Applicable): No results found for: PREGTESTUR, PREGSERUM, HCG, HCGQUANT     ABGs: No results found for: PHART, PO2ART, discussed with patient. Plan discussed with attending. Liston Castleman, APRN - CRNA   3/15/2020      Pt seen, examined, chart reviewed, plan discussed.   Liliya Jones  3/15/2020  1:04 PM

## 2020-03-15 NOTE — PROGRESS NOTES
Advanced Heart Failure and Pulmonary Hypertension  Inpatient Progress Note          CC/ID: atrial fibrillation, RVR, self converted, [place] [facility] [place] of [place]IB history/positive stools, unsure of history. Transferred from [place], no known cardiac history other than this PAF        24-hour events/subjective:  -no acute overnight events  -with rate control therapy she self converted and is now in NSR  -no complaints  -initiated on ESRD          Telemetry:  NSR, HR 60s        Physical Exam:    BP (!) 160/108   Pulse 65   Temp 98.3 °F (36.8 °C)   Resp 16   Wt 97 lb 3.6 oz (44.1 kg)   SpO2 100%   BMI 18.37 kg/m²   Wt Readings from Last 3 Encounters:   03/15/20 97 lb 3.6 oz (44.1 kg)   09/03/19 89 lb (40.4 kg)   08/01/19 90 lb (40.8 kg)     Appearance: Awake, alert, no acute respiratory distress  Skin: Intact, no rash  Head: Normocephalic, atraumatic  Eyes: EOMI, no conjunctival erythema, anicteric sclerae  ENMT: No pharyngeal erythema, MMM, no rhinorrhea  Neck: Soft, supple, no JVD with a JVP 8  Lungs: Clear to auscultation bilaterally. No wheezes, rales, or rhonchi.   Cardiac: Regular rate and rhythm, +S1S2, no murmurs apparent  Abdomen: Soft, nontender, +bowel sounds  Extremities: Moves all extremities x 4, no lower extremity edema  Neurologic: No focal motor deficits apparent, normal mood and affect  Peripheral Pulses: Intact posterior tibial pulses bilaterally            Laboratory Tests:    Lab Results   Component Value Date    CREATININE 4.7 (H) 03/15/2020    BUN 72 (H) 03/15/2020     03/15/2020    K 4.7 03/15/2020    CL 92 (L) 03/15/2020    CO2 24 03/15/2020     Lab Results   Component Value Date    MG 2.5 03/15/2020     Lab Results   Component Value Date    WBC 14.2 (H) 03/15/2020    HGB 9.9 (L) 03/15/2020    HCT 31.7 (L) 03/15/2020    MCV 96.6 03/15/2020    PLT 96 (L) 03/15/2020     Lab Results   Component Value Date    TROPONINI 0.05 (H) 03/14/2020     Lab Results   Component Value Date    INR 1.4 03/13/2020    PROTIME 15.4 (H) 03/13/2020     Lab Results   Component Value Date     (H) 03/15/2020     (H) 03/15/2020     (H) 03/14/2020    ALKPHOS 77 03/15/2020    BILITOT 0.7 03/15/2020     Lab Results   Component Value Date    TSH 4.610 (H) 03/13/2020         Current Medications:  Current Facility-Administered Medications   Medication Dose Route Frequency Provider Last Rate Last Dose    hydrALAZINE (APRESOLINE) injection 5 mg  5 mg Intravenous Q4H PRN Thomas Turk MD   5 mg at 03/15/20 1136    dextrose 5 % and 0.9 % sodium chloride infusion   Intravenous Continuous Nikita Galarza MD 40 mL/hr at 03/15/20 1029      anticoagulant sodium citrate 4 GM/100ML solution 0.104 g  2.6 mL Intracatheter PRN Nikita Galarza MD   0.104 g at 03/15/20 1224    methylPREDNISolone sodium (SOLU-MEDROL) injection 40 mg  40 mg Intravenous Q8H All Mcbride MD        pantoprazole (PROTONIX) injection 40 mg  40 mg Intravenous BID All Mcbride MD   40 mg at 03/15/20 4453    And    sodium chloride (PF) 0.9 % injection 10 mL  10 mL Intravenous BID Susanodnuria Berg MD   10 mL at 03/15/20 0808    metoprolol (LOPRESSOR) injection 5 mg  5 mg Intravenous Q6H All Mcbride MD   5 mg at 03/15/20 0914    ampicillin-sulbactam (UNASYN) 3 g ivpb minibag  3 g Intravenous Q24H All Mcbride MD   Stopped at 03/15/20 0135    ferrous sulfate (IRON 325) tablet 325 mg  325 mg Oral BID  All Mcbride MD   325 mg at 03/15/20 9570    metoprolol succinate (TOPROL XL) extended release tablet 50 mg  50 mg Oral BID [facility] SAMUEL Carmichael - CNP   50 mg at 03/15/20 9800    sodium chloride flush 0.9 % injection 10 mL  10 mL Intravenous 2 times per day Elder Ochoa MD   10 mL at 03/15/20 0808    sodium chloride flush 0.9 % injection 10 mL  10 mL Intravenous PRN Elder Ochoa MD        acetaminophen (TYLENOL) tablet 650 mg  650 mg Oral Q6H PRN Elder Ochoa MD        Or    acetaminophen (TYLENOL) suppository 650 mg  650 mg Rectal Q6H PRN Reji Mccurdy MD        polyethylene glycol [facility]) packet 17 g  17 g Oral Daily PRN Reji Mccurdy MD        promethazine (PHENERGAN) tablet 12.5 mg  12.5 mg Oral Q6H PRN Reji Mccurdy MD   12.5 mg at 03/14/20 0143    Or    ondansetron (ZOFRAN) injection 4 mg  4 mg Intravenous Q6H PRN Reji Mccurdy MD        ipratropium-albuterol (DUONEB) nebulizer solution 1 ampule  1 ampule Inhalation Q4H WA Reji Mccurdy MD   1 ampule at 03/15/20 1153    Arformoterol Tartrate (BROVANA) nebulizer solution 15 mcg  15 mcg Nebulization BID Reji Mccurdy MD   15 mcg at 03/15/20 0858    budesonide (PULMICORT) nebulizer suspension 250 mcg  250 mcg Nebulization BID Reji Mccurdy MD   250 mcg at 03/15/20 0859    dextrose 50 % IV solution  25 g Intravenous Once Jyoti Bauer MD        glucose (GLUTOSE) 40 % oral gel 15 g  15 g Oral PRN Jyoit Bauer MD        dextrose 50 % IV solution  12.5 g Intravenous PRN Jyoti Bauer MD        glucagon (rDNA) injection 1 mg  1 mg Intramuscular PRN Jyoti Bauer MD        dextrose 5 % solution  100 mL/hr Intravenous PRN Jyoti Bauer MD        sodium polystyrene (KAYEXALATE) 15 GM/60ML suspension 15 g  15 g Oral Once Jyoti Bauer MD        glucose (GLUTOSE) 40 % oral gel 15 g  15 g Oral PRN Jyoti Bauer MD        dextrose 50 % IV solution  12.5 g Intravenous PRN Jyoti Bauer MD        glucagon (rDNA) injection 1 mg  1 mg Intramuscular PRN Jyoti Bauer MD        dextrose 5 % solution  100 mL/hr Intravenous PRN Jyoti Bauer MD        perflutren lipid microspheres (DEFINITY) injection 1.65 mg  1.5 mL Intravenous ONCE PRN Reji Mccurdy MD        thiamine (B-1) injection 100 mg  100 mg Intravenous Daily Reji Mccurdy MD   100 mg at 94/34/14 3078    folic acid injection 1 mg  1 mg Intravenous Daily Reji Mccurdy MD   1 mg at 03/15/20 0820      dextrose 5 % and 0.9 % NaCl 40 mL/hr at 03/15/20 1029    dextrose      dextrose               ASSESSMENT:  1. Atrial fibrillation with RVR -  Currently on cardizem gtt at 5 mg/hr  2. Unable to use [address] in the setting of GIB  3. Hypertensive emergency --> uncontrolled 881H systolic  4. KAYLA - nephrology following  --> ESRD on HD now  5. Hyperkalemia  6. Acute respiratory failure on Bipap  7. COPD with hx of tobacco use  8. Transaminitis  9. Hx of ETOH abuse  10. GIB - melanotic stools and positive FOBT. Surgery following. RECOMMENDATIONS:  1. Continue rate control therapy - switch off the diltiazem gtt, continue toprol XL  2. Reassess GIB issues and timing of when to determine when to re introduce [address]  3. Will follow every few days  4. TTE pending      Thank you for allowing us to participate in the care of this patient. We will follow as medical course develops. Do not hesitate to contact us with further questions. Satish Camp M.D.  [address] [facility]  Heart Failure and Pulmonary Hypertension  [facility]   Mobile [phone]

## 2020-03-15 NOTE — PROGRESS NOTES
Perla Díaz MD   12.5 mg at 03/14/20 0143    Or    ondansetron (ZOFRAN) injection 4 mg  4 mg Intravenous Q6H PRN Valerie Brown MD        ipratropium-albuterol (DUONEB) nebulizer solution 1 ampule  1 ampule Inhalation Q4H WA Valerie Brown MD   1 ampule at 03/15/20 0859    Arformoterol Tartrate (BROVANA) nebulizer solution 15 mcg  15 mcg Nebulization BID Valerie Brown MD   15 mcg at 03/15/20 0858    budesonide (PULMICORT) nebulizer suspension 250 mcg  250 mcg Nebulization BID Valerie Brown MD   250 mcg at 03/15/20 0859    dextrose 50 % IV solution  25 g Intravenous Once Demar Parker MD        glucose (GLUTOSE) 40 % oral gel 15 g  15 g Oral PRN Demar Parker MD        dextrose 50 % IV solution  12.5 g Intravenous PRN Demar Parker MD        glucagon (rDNA) injection 1 mg  1 mg Intramuscular PRN Demar Parker MD        dextrose 5 % solution  100 mL/hr Intravenous PRN Demar Parker MD        sodium polystyrene (KAYEXALATE) 15 GM/60ML suspension 15 g  15 g Oral Once Demar Parker MD        glucose (GLUTOSE) 40 % oral gel 15 g  15 g Oral PRN Demar Parker MD        dextrose 50 % IV solution  12.5 g Intravenous PRN Demar Parker MD        glucagon (rDNA) injection 1 mg  1 mg Intramuscular PRN Demar Parker MD        dextrose 5 % solution  100 mL/hr Intravenous PRN Demar Parker MD        perflutren lipid microspheres (DEFINITY) injection 1.65 mg  1.5 mL Intravenous ONCE PRN Valerie Brown MD        thiamine (B-1) injection 100 mg  100 mg Intravenous Daily Valerie Brown MD   100 mg at 72/78/71 0173    folic acid injection 1 mg  1 mg Intravenous Daily Valerie Brown MD   1 mg at 03/15/20 0820        ATB:   Antibiotics  Date   Unasyn               Labs     CBC:   Lab Results   Component Value Date    WBC 14.4 03/15/2020    RBC 3.32 03/15/2020    HGB 10.0 03/15/2020    HCT 31.6 03/15/2020    MCV 95.2 03/15/2020    MCH 30.1 03/15/2020    MCHC 31.6 03/15/2020    RDW 15.7 03/15/2020    PLT 95 03/15/2020    MPV 11.0 03/15/2020     BMP:    Lab Results   Component Value Date     03/15/2020    K 4.7 03/15/2020    K 4.8 03/15/2020    CL 92 03/15/2020    CO2 24 03/15/2020    BUN 72 03/15/2020    LABALBU 3.2 03/15/2020    CREATININE 4.7 03/15/2020    CALCIUM 7.9 03/15/2020    GFRAA 11 03/15/2020    LABGLOM 9 03/15/2020    GLUCOSE 152 03/15/2020          Resident's Assessment and Plan   Mira Perez is a 68 y.o. female who was brought to the MICU for     Neurologic  Acute metabolic encephalopathy of unclear etiology (resolved)  · Normal ammonia  · ABG negative for hypercapnia  · Elevated TSH with normal T4  · A a OX3 today     Cardiovascular  A. fib  · New onset  · Responds well to Lopressor  · Continue Lopressor every 6 hours  · Chads vas >2 but currently GI bleed  · We will hold anticoagulation at this time  · Diltiazem drip stopped due to sinus [place]  Hypertensive emergency  · Initial /90  · Remains hypertensive  · Unable to give renal likely agents due to KAYLA  · Not responsive to loop diuretics  · Continue Lopressor every 6 hours with holding parameter for map <120  · Will receive HD today  Heart failure? · History of alcoholism  · Possible dilated cardiomyopathy  · proBNP 49,000  · Follow-up complete echo  Prolonged QTc   · Avoid QT prolonging meds     Pulmonary  Pleural effusion most likely 2/2 CHF  · CXR initially concerning for possible pneumonia  · CT abdomen shows right-sided pleural effusion  COPD exacerbation?   · COPD on home oxygen at 2 L  · Diffuse bilateral wheezing  · Started on Brovana, Pulmicort, duo nebs   · Wean Solu-Medrol 40 every 8 H     Gastrointestinal  Transaminitis most likely 2/2 alcoholic hepatitis  · Initial  AST 1300   · Acetaminophen levels within normal limits  · Awaiting urine and serum drug screening  · CT abdomen unremarkable  · Follow-up CMV EBV DELANO  · Follow-up hepatitis panel  · Continue to trend LFTs  · CT abdomen unremarkable for acute intra-abdominal pathologies  · Follow-up CK  Pancreatitis? · Currently n.p.o. · Was on fluids but stopped due to possible CHF exacerbation  · Lipase 492: Downtrending  · Abdominal pain in the upper left quadrant  · History of alcoholism  Upper GI bleed  · Melanotic stools  · FOBT positive  · General surgery consulted  · We will transfuse if hemoglobin <7  · Continue Protonix BID   · For EGD today  Alcoholism  · Folate B12 within normal limits  · Has had history of withdrawal in the past  · Given age [de-identified] not in place  · Will most likely need Precedex if patient becomes symptomatic use benzodiazepines sparingly     Infectious Disease  Sepsis? · WBC 14.5  · Procalcitonin 0.92  · Respiratory panel negative  · Follow-up respiratory culture  · Received 1 dose of Unasyn     Genitourinary/Renal  KAYLA most likely prerenal  · CR from approximately 5 days ago 1.2  · Initial CR 3.4  · Currently up trending  · Follow-up urine studies  · Minimal urine output   · Nephrology consulted  · Continue to monitor renal function  Hyperkalemia 2/2 oral potassium, spironolactone and dehydration  · Hold home potassium and spironolactone  · Initial EKG showed peaked T waves  · Received calcium gluconate, insulin D50 albuterol and Kayexalate  · We will continue to monitor potassium  High anion gap metabolic acidosis   · Normal lactic acid  · Minimal increase in serum osmolar gap     Hematology/Oncology  Normocytic anemia  · Low iron and iron saturation with normal ferritin  · Normal folate and B12  Thrombocytopenia  · INR 1.4 APTT 27.5   · Follow up PBS     Diet NPO, After Midnight Exceptions are: Sips with Concepcion Wiley MD, PGY-1  Attending physician: Dr. Laury Dela Cruz  Addendum ICU Attending Statement     Sp Garcia was seen, examined and discussed with the multi-disciplinary ICU team during rounds. A addendum note may be separate to the residents note.  If not then, I have personally seen and examined the patient and the key elements of the encounter were performed by me (> 85 % time). The medications & laboratory data was discussed and adjusted where necessary. The radiographic images were reviewed either as a group or with radiologist.  Any changes are document or changed if felt dis-concordant with the exam or history. The above findings were corroborated, plans confirmed and changes made if needed. Family was updated at the bedside as available. Key issues of the case were discussed among consultants. Critical Care time is documented if appropriate.       Susan Serrato DO, MPH  Professor of Medicine

## 2020-03-15 NOTE — ANESTHESIA POSTPROCEDURE EVALUATION
Department of Anesthesiology  Postprocedure Note    Patient: Dominic Waller  MRN: [id number]  YOB: 1950  Date of evaluation: 3/15/2020  Time:  1:51 PM     Procedure Summary     Date:  03/15/20 Room / Location:  [address] / [facility]    Anesthesia Start:  1304 Anesthesia Stop:      Procedure:  EGD DIAGNOSTIC ONLY (N/A ) Diagnosis:       (Anemia   Melena)      (Bedside)    Surgeon:  Angelique Schofield MD Responsible Provider:  Dori Back MD    Anesthesia Type:  MAC ASA Status:  3          Anesthesia Type: MAC    Krishna Phase I: Krishna Score: 9    Krishna Phase II:      Last vitals: Reviewed and per EMR flowsheets.        Anesthesia Post Evaluation    Patient location during evaluation: ICU  Patient participation: complete - patient participated  Level of consciousness: awake  Pain score: 3  Airway patency: patent  Nausea & Vomiting: no nausea and no vomiting  Complications: no  Cardiovascular status: blood pressure returned to baseline  Respiratory status: acceptable  Hydration status: euvolemic

## 2020-03-16 ENCOUNTER — APPOINTMENT (OUTPATIENT)
Dept: GENERAL RADIOLOGY | Age: 70
DRG: 673 | End: 2020-03-16
Attending: INTERNAL MEDICINE
Payer: COMMERCIAL

## 2020-03-16 LAB
ACETAMINOPHEN LEVEL: <5 MCG/ML (ref 10–30)
ALBUMIN SERPL-MCNC: 2.7 G/DL (ref 3.5–5.2)
ALBUMIN SERPL-MCNC: 3.5 G/DL (ref 3.5–5.2)
ALP BLD-CCNC: 69 U/L (ref 35–104)
ALP BLD-CCNC: 77 U/L (ref 35–104)
ALT SERPL-CCNC: 358 U/L (ref 0–32)
ALT SERPL-CCNC: 559 U/L (ref 0–32)
ANGLE (CLOT STRENGTH): 64.5 DEGREE (ref 59–74)
ANION GAP SERPL CALCULATED.3IONS-SCNC: 17 MMOL/L (ref 7–16)
ANION GAP SERPL CALCULATED.3IONS-SCNC: 20 MMOL/L (ref 7–16)
ANISOCYTOSIS: ABNORMAL
APTT: 25.9 SEC (ref 24.5–35.1)
AST SERPL-CCNC: 177 U/L (ref 0–31)
AST SERPL-CCNC: 281 U/L (ref 0–31)
B.E.: -4.7 MMOL/L (ref -3–3)
BASOPHILS ABSOLUTE: 0 E9/L (ref 0–0.2)
BASOPHILS RELATIVE PERCENT: 0.1 % (ref 0–2)
BILIRUB SERPL-MCNC: 0.6 MG/DL (ref 0–1.2)
BILIRUB SERPL-MCNC: 0.6 MG/DL (ref 0–1.2)
BILIRUBIN DIRECT: 0.3 MG/DL (ref 0–0.3)
BILIRUBIN, INDIRECT: 0.3 MG/DL (ref 0–1)
BUN BLDV-MCNC: 28 MG/DL (ref 8–23)
BUN BLDV-MCNC: 44 MG/DL (ref 8–23)
CALCIUM SERPL-MCNC: 7.7 MG/DL (ref 8.6–10.2)
CALCIUM SERPL-MCNC: 8.2 MG/DL (ref 8.6–10.2)
CHLORIDE BLD-SCNC: 93 MMOL/L (ref 98–107)
CHLORIDE BLD-SCNC: 93 MMOL/L (ref 98–107)
CO2: 19 MMOL/L (ref 22–29)
CO2: 25 MMOL/L (ref 22–29)
COHB: 0.3 % (ref 0–1.5)
CREAT SERPL-MCNC: 2.8 MG/DL (ref 0.5–1)
CREAT SERPL-MCNC: 3.7 MG/DL (ref 0.5–1)
CRITICAL: ABNORMAL
DATE ANALYZED: ABNORMAL
DATE OF COLLECTION: ABNORMAL
EOSINOPHILS ABSOLUTE: 0 E9/L (ref 0.05–0.5)
EOSINOPHILS RELATIVE PERCENT: 0 % (ref 0–6)
FIO2: 100 %
G-TEG: 5.9 K D/SC (ref 4.5–11)
GFR AFRICAN AMERICAN: 15
GFR AFRICAN AMERICAN: 20
GFR NON-AFRICAN AMERICAN: 12 ML/MIN/1.73
GFR NON-AFRICAN AMERICAN: 17 ML/MIN/1.73
GLUCOSE BLD-MCNC: 133 MG/DL (ref 74–99)
GLUCOSE BLD-MCNC: 167 MG/DL (ref 74–99)
HAV IGM SER IA-ACNC: NORMAL
HBV SURFACE AB TITR SER: NORMAL {TITER}
HCO3: 20.2 MMOL/L (ref 22–26)
HCT VFR BLD CALC: 32 % (ref 34–48)
HCT VFR BLD CALC: 35.1 % (ref 34–48)
HEMOGLOBIN: 10.9 G/DL (ref 11.5–15.5)
HEMOGLOBIN: 9.9 G/DL (ref 11.5–15.5)
HEPATITIS B CORE IGM ANTIBODY: NORMAL
HEPATITIS B SURFACE ANTIGEN INTERPRETATION: NORMAL
HEPATITIS C ANTIBODY INTERPRETATION: NORMAL
HHB: 0.2 % (ref 0–5)
INR BLD: 1.3
K (CLOTTING TIME): 2 MIN (ref 1–3)
LAB: ABNORMAL
LACTIC ACID: 6 MMOL/L (ref 0.5–2.2)
LIPASE: 100 U/L (ref 13–60)
LYMPHOCYTES ABSOLUTE: 0.31 E9/L (ref 1.5–4)
LYMPHOCYTES RELATIVE PERCENT: 1.7 % (ref 20–42)
Lab: ABNORMAL
MA (MAX AMPLITUDE): 54.1 MM (ref 50–70)
MAGNESIUM: 2 MG/DL (ref 1.6–2.6)
MAGNESIUM: 2.1 MG/DL (ref 1.6–2.6)
MCH RBC QN AUTO: 30 PG (ref 26–35)
MCHC RBC AUTO-ENTMCNC: 30.9 % (ref 32–34.5)
MCV RBC AUTO: 97 FL (ref 80–99.9)
METER GLUCOSE: 143 MG/DL (ref 74–99)
METHB: 0.5 % (ref 0–1.5)
MODE: AC
MONOCYTES ABSOLUTE: 0.31 E9/L (ref 0.1–0.95)
MONOCYTES RELATIVE PERCENT: 1.7 % (ref 2–12)
NEUTROPHILS ABSOLUTE: 15.04 E9/L (ref 1.8–7.3)
NEUTROPHILS RELATIVE PERCENT: 96.5 % (ref 43–80)
NUCLEATED RED BLOOD CELLS: 6.1 /100 WBC
O2 SATURATION: 99.8 % (ref 92–98.5)
O2HB: 99 % (ref 94–97)
OPERATOR ID: 1874
OVALOCYTES: ABNORMAL
PATHOLOGIST REVIEW: NORMAL
PATIENT TEMP: 37 C
PCO2: 36.5 MMHG (ref 35–45)
PDW BLD-RTO: 15.9 FL (ref 11.5–15)
PEEP/CPAP: 5 CMH2O
PH BLOOD GAS: 7.36 (ref 7.35–7.45)
PHOSPHORUS: 6 MG/DL (ref 2.5–4.5)
PHOSPHORUS: 6 MG/DL (ref 2.5–4.5)
PLATELET # BLD: 103 E9/L (ref 130–450)
PMV BLD AUTO: 12 FL (ref 7–12)
PO2: >500 MMHG (ref 75–100)
POIKILOCYTES: ABNORMAL
POLYCHROMASIA: ABNORMAL
POTASSIUM SERPL-SCNC: 4.5 MMOL/L (ref 3.5–5)
POTASSIUM SERPL-SCNC: 4.7 MMOL/L (ref 3.5–5)
PROCALCITONIN: 0.7 NG/ML (ref 0–0.08)
PROTHROMBIN TIME: 14.2 SEC (ref 9.3–12.4)
R (REACTION TIME): 4.1 MIN (ref 5–10)
RBC # BLD: 3.3 E12/L (ref 3.5–5.5)
RR MECHANICAL: 14 B/MIN
SCHISTOCYTES: ABNORMAL
SODIUM BLD-SCNC: 132 MMOL/L (ref 132–146)
SODIUM BLD-SCNC: 135 MMOL/L (ref 132–146)
SOURCE, BLOOD GAS: ABNORMAL
THB: 8.4 G/DL (ref 11.5–16.5)
TIME ANALYZED: 2241
TOTAL PROTEIN: 4.5 G/DL (ref 6.4–8.3)
TOTAL PROTEIN: 6 G/DL (ref 6.4–8.3)
VITAMIN D 25-HYDROXY: 20 NG/ML (ref 30–100)
VT MECHANICAL: 450 ML
WBC # BLD: 15.5 E9/L (ref 4.5–11.5)

## 2020-03-16 PROCEDURE — 71045 X-RAY EXAM CHEST 1 VIEW: CPT

## 2020-03-16 PROCEDURE — 2580000003 HC RX 258: Performed by: INTERNAL MEDICINE

## 2020-03-16 PROCEDURE — 6360000002 HC RX W HCPCS: Performed by: INTERNAL MEDICINE

## 2020-03-16 PROCEDURE — 85576 BLOOD PLATELET AGGREGATION: CPT

## 2020-03-16 PROCEDURE — 99233 SBSQ HOSP IP/OBS HIGH 50: CPT | Performed by: INTERNAL MEDICINE

## 2020-03-16 PROCEDURE — 85014 HEMATOCRIT: CPT

## 2020-03-16 PROCEDURE — 6370000000 HC RX 637 (ALT 250 FOR IP): Performed by: NURSE PRACTITIONER

## 2020-03-16 PROCEDURE — 82805 BLOOD GASES W/O2 SATURATION: CPT

## 2020-03-16 PROCEDURE — 2500000003 HC RX 250 WO HCPCS: Performed by: INTERNAL MEDICINE

## 2020-03-16 PROCEDURE — 85610 PROTHROMBIN TIME: CPT

## 2020-03-16 PROCEDURE — 85025 COMPLETE CBC W/AUTO DIFF WBC: CPT

## 2020-03-16 PROCEDURE — 6370000000 HC RX 637 (ALT 250 FOR IP): Performed by: INTERNAL MEDICINE

## 2020-03-16 PROCEDURE — 2000000000 HC ICU R&B

## 2020-03-16 PROCEDURE — 80048 BASIC METABOLIC PNL TOTAL CA: CPT

## 2020-03-16 PROCEDURE — 85018 HEMOGLOBIN: CPT

## 2020-03-16 PROCEDURE — 83690 ASSAY OF LIPASE: CPT

## 2020-03-16 PROCEDURE — 80076 HEPATIC FUNCTION PANEL: CPT

## 2020-03-16 PROCEDURE — 5A1945Z RESPIRATORY VENTILATION, 24-96 CONSECUTIVE HOURS: ICD-10-PCS | Performed by: INTERNAL MEDICINE

## 2020-03-16 PROCEDURE — 83735 ASSAY OF MAGNESIUM: CPT

## 2020-03-16 PROCEDURE — 6370000000 HC RX 637 (ALT 250 FOR IP): Performed by: SURGERY

## 2020-03-16 PROCEDURE — 84100 ASSAY OF PHOSPHORUS: CPT

## 2020-03-16 PROCEDURE — 82306 VITAMIN D 25 HYDROXY: CPT

## 2020-03-16 PROCEDURE — 90935 HEMODIALYSIS ONE EVALUATION: CPT

## 2020-03-16 PROCEDURE — 80307 DRUG TEST PRSMV CHEM ANLYZR: CPT

## 2020-03-16 PROCEDURE — 2700000000 HC OXYGEN THERAPY PER DAY

## 2020-03-16 PROCEDURE — 36415 COLL VENOUS BLD VENIPUNCTURE: CPT

## 2020-03-16 PROCEDURE — 80053 COMPREHEN METABOLIC PANEL: CPT

## 2020-03-16 PROCEDURE — 0BH17EZ INSERTION OF ENDOTRACHEAL AIRWAY INTO TRACHEA, VIA NATURAL OR ARTIFICIAL OPENING: ICD-10-PCS | Performed by: INTERNAL MEDICINE

## 2020-03-16 PROCEDURE — 85384 FIBRINOGEN ACTIVITY: CPT

## 2020-03-16 PROCEDURE — 94640 AIRWAY INHALATION TREATMENT: CPT

## 2020-03-16 PROCEDURE — 82962 GLUCOSE BLOOD TEST: CPT

## 2020-03-16 PROCEDURE — 5A1D70Z PERFORMANCE OF URINARY FILTRATION, INTERMITTENT, LESS THAN 6 HOURS PER DAY: ICD-10-PCS | Performed by: INTERNAL MEDICINE

## 2020-03-16 PROCEDURE — 97530 THERAPEUTIC ACTIVITIES: CPT

## 2020-03-16 PROCEDURE — 6360000002 HC RX W HCPCS

## 2020-03-16 PROCEDURE — 31500 INSERT EMERGENCY AIRWAY: CPT

## 2020-03-16 PROCEDURE — 83605 ASSAY OF LACTIC ACID: CPT

## 2020-03-16 PROCEDURE — 85347 COAGULATION TIME ACTIVATED: CPT

## 2020-03-16 PROCEDURE — 97162 PT EVAL MOD COMPLEX 30 MIN: CPT

## 2020-03-16 PROCEDURE — 85730 THROMBOPLASTIN TIME PARTIAL: CPT

## 2020-03-16 PROCEDURE — 97535 SELF CARE MNGMENT TRAINING: CPT

## 2020-03-16 PROCEDURE — 6360000002 HC RX W HCPCS: Performed by: STUDENT IN AN ORGANIZED HEALTH CARE EDUCATION/TRAINING PROGRAM

## 2020-03-16 PROCEDURE — 94002 VENT MGMT INPAT INIT DAY: CPT

## 2020-03-16 PROCEDURE — 84145 PROCALCITONIN (PCT): CPT

## 2020-03-16 PROCEDURE — 82330 ASSAY OF CALCIUM: CPT

## 2020-03-16 PROCEDURE — 97166 OT EVAL MOD COMPLEX 45 MIN: CPT

## 2020-03-16 RX ORDER — MIDAZOLAM HYDROCHLORIDE 1 MG/ML
2 INJECTION INTRAMUSCULAR; INTRAVENOUS ONCE
Status: COMPLETED | OUTPATIENT
Start: 2020-03-17 | End: 2020-03-16

## 2020-03-16 RX ORDER — AMLODIPINE BESYLATE 10 MG/1
10 TABLET ORAL DAILY
Status: DISCONTINUED | OUTPATIENT
Start: 2020-03-16 | End: 2020-03-25 | Stop reason: SDUPTHER

## 2020-03-16 RX ORDER — FOLIC ACID 1 MG/1
1 TABLET ORAL DAILY
Status: DISCONTINUED | OUTPATIENT
Start: 2020-03-17 | End: 2020-03-17 | Stop reason: ALTCHOICE

## 2020-03-16 RX ORDER — PREDNISONE 20 MG/1
20 TABLET ORAL DAILY
Status: DISCONTINUED | OUTPATIENT
Start: 2020-03-17 | End: 2020-03-19

## 2020-03-16 RX ORDER — METOPROLOL SUCCINATE 100 MG/1
100 TABLET, EXTENDED RELEASE ORAL DAILY
Status: DISCONTINUED | OUTPATIENT
Start: 2020-03-17 | End: 2020-03-26 | Stop reason: HOSPADM

## 2020-03-16 RX ORDER — HYDRALAZINE HYDROCHLORIDE 50 MG/1
50 TABLET, FILM COATED ORAL EVERY 8 HOURS SCHEDULED
Status: DISCONTINUED | OUTPATIENT
Start: 2020-03-16 | End: 2020-03-21

## 2020-03-16 RX ORDER — THIAMINE MONONITRATE (VIT B1) 100 MG
100 TABLET ORAL DAILY
Status: DISCONTINUED | OUTPATIENT
Start: 2020-03-17 | End: 2020-03-17 | Stop reason: ALTCHOICE

## 2020-03-16 RX ORDER — FENTANYL CITRATE 50 UG/ML
INJECTION, SOLUTION INTRAMUSCULAR; INTRAVENOUS
Status: DISPENSED
Start: 2020-03-16 | End: 2020-03-17

## 2020-03-16 RX ORDER — METHYLPREDNISOLONE SODIUM SUCCINATE 40 MG/ML
20 INJECTION, POWDER, LYOPHILIZED, FOR SOLUTION INTRAMUSCULAR; INTRAVENOUS EVERY 12 HOURS
Status: DISCONTINUED | OUTPATIENT
Start: 2020-03-16 | End: 2020-03-16 | Stop reason: SDUPTHER

## 2020-03-16 RX ORDER — FAMOTIDINE 20 MG/1
20 TABLET, FILM COATED ORAL DAILY
Status: DISCONTINUED | OUTPATIENT
Start: 2020-03-16 | End: 2020-03-17

## 2020-03-16 RX ORDER — FAMOTIDINE 20 MG/1
20 TABLET, FILM COATED ORAL DAILY
Qty: 60 TABLET | Refills: 3 | Status: SHIPPED | OUTPATIENT
Start: 2020-03-16

## 2020-03-16 RX ORDER — MIDAZOLAM HYDROCHLORIDE 1 MG/ML
INJECTION INTRAMUSCULAR; INTRAVENOUS
Status: COMPLETED
Start: 2020-03-16 | End: 2020-03-16

## 2020-03-16 RX ORDER — METHYLPREDNISOLONE SODIUM SUCCINATE 40 MG/ML
40 INJECTION, POWDER, LYOPHILIZED, FOR SOLUTION INTRAMUSCULAR; INTRAVENOUS ONCE
Status: COMPLETED | OUTPATIENT
Start: 2020-03-16 | End: 2020-03-16

## 2020-03-16 RX ORDER — ALBUTEROL SULFATE 90 UG/1
2 AEROSOL, METERED RESPIRATORY (INHALATION) EVERY 4 HOURS PRN
Status: DISCONTINUED | OUTPATIENT
Start: 2020-03-16 | End: 2020-03-21

## 2020-03-16 RX ADMIN — AMPICILLIN SODIUM AND SULBACTAM SODIUM 3 G: 2; 1 INJECTION, POWDER, FOR SOLUTION INTRAMUSCULAR; INTRAVENOUS at 02:01

## 2020-03-16 RX ADMIN — BUDESONIDE 250 MCG: 0.25 SUSPENSION RESPIRATORY (INHALATION) at 05:00

## 2020-03-16 RX ADMIN — METHYLPREDNISOLONE SODIUM SUCCINATE 40 MG: 40 INJECTION, POWDER, FOR SOLUTION INTRAMUSCULAR; INTRAVENOUS at 02:01

## 2020-03-16 RX ADMIN — DEXTROSE AND SODIUM CHLORIDE: 5; 900 INJECTION, SOLUTION INTRAVENOUS at 18:11

## 2020-03-16 RX ADMIN — HYDRALAZINE HYDROCHLORIDE 5 MG: 20 INJECTION INTRAMUSCULAR; INTRAVENOUS at 14:00

## 2020-03-16 RX ADMIN — FERROUS SULFATE TAB 325 MG (65 MG ELEMENTAL FE) 325 MG: 325 (65 FE) TAB at 16:55

## 2020-03-16 RX ADMIN — HYDRALAZINE HYDROCHLORIDE 25 MG: 25 TABLET, FILM COATED ORAL at 05:55

## 2020-03-16 RX ADMIN — IPRATROPIUM BROMIDE AND ALBUTEROL SULFATE 1 AMPULE: 2.5; .5 SOLUTION RESPIRATORY (INHALATION) at 08:45

## 2020-03-16 RX ADMIN — DEXTROSE AND SODIUM CHLORIDE: 5; 900 INJECTION, SOLUTION INTRAVENOUS at 08:29

## 2020-03-16 RX ADMIN — METHYLPREDNISOLONE SODIUM SUCCINATE 40 MG: 40 INJECTION, POWDER, FOR SOLUTION INTRAMUSCULAR; INTRAVENOUS at 05:51

## 2020-03-16 RX ADMIN — AMLODIPINE BESYLATE 10 MG: 10 TABLET ORAL at 16:55

## 2020-03-16 RX ADMIN — FOLIC ACID 1 MG: 5 INJECTION, SOLUTION INTRAMUSCULAR; INTRAVENOUS; SUBCUTANEOUS at 08:15

## 2020-03-16 RX ADMIN — METOPROLOL TARTRATE 5 MG: 5 INJECTION INTRAVENOUS at 03:24

## 2020-03-16 RX ADMIN — FAMOTIDINE 20 MG: 20 TABLET ORAL at 08:22

## 2020-03-16 RX ADMIN — MIDAZOLAM HYDROCHLORIDE 2 MG: 1 INJECTION INTRAMUSCULAR; INTRAVENOUS at 23:50

## 2020-03-16 RX ADMIN — METHYLPREDNISOLONE SODIUM SUCCINATE 40 MG: 40 INJECTION, POWDER, FOR SOLUTION INTRAMUSCULAR; INTRAVENOUS at 10:48

## 2020-03-16 RX ADMIN — HYDRALAZINE HYDROCHLORIDE 50 MG: 50 TABLET, FILM COATED ORAL at 15:45

## 2020-03-16 RX ADMIN — THIAMINE HYDROCHLORIDE 100 MG: 100 INJECTION, SOLUTION INTRAMUSCULAR; INTRAVENOUS at 08:14

## 2020-03-16 RX ADMIN — METOPROLOL SUCCINATE 50 MG: 50 TABLET, EXTENDED RELEASE ORAL at 08:14

## 2020-03-16 RX ADMIN — MIDAZOLAM HYDROCHLORIDE 2 MG: 2 INJECTION, SOLUTION INTRAMUSCULAR; INTRAVENOUS at 23:50

## 2020-03-16 RX ADMIN — HYDRALAZINE HYDROCHLORIDE 5 MG: 20 INJECTION INTRAMUSCULAR; INTRAVENOUS at 18:08

## 2020-03-16 RX ADMIN — METOPROLOL TARTRATE 5 MG: 5 INJECTION INTRAVENOUS at 08:22

## 2020-03-16 RX ADMIN — HYDRALAZINE HYDROCHLORIDE 5 MG: 20 INJECTION INTRAMUSCULAR; INTRAVENOUS at 09:45

## 2020-03-16 RX ADMIN — IPRATROPIUM BROMIDE AND ALBUTEROL SULFATE 1 AMPULE: 2.5; .5 SOLUTION RESPIRATORY (INHALATION) at 17:24

## 2020-03-16 RX ADMIN — ARFORMOTEROL TARTRATE 15 MCG: 15 SOLUTION RESPIRATORY (INHALATION) at 05:00

## 2020-03-16 RX ADMIN — FERROUS SULFATE TAB 325 MG (65 MG ELEMENTAL FE) 325 MG: 325 (65 FE) TAB at 08:14

## 2020-03-16 RX ADMIN — SODIUM CHLORIDE, PRESERVATIVE FREE 10 ML: 5 INJECTION INTRAVENOUS at 08:22

## 2020-03-16 ASSESSMENT — PULMONARY FUNCTION TESTS
PIF_VALUE: 30
PIF_VALUE: 33

## 2020-03-16 ASSESSMENT — PAIN SCALES - GENERAL
PAINLEVEL_OUTOF10: 0

## 2020-03-16 NOTE — PROGRESS NOTES
remains 72/4.7    Accelerated hypertension-i  improved on current regimen    Elevated LFTs/amylase lipase trending down    DVT Prophylaxis   PT/OT  Discharge planning   Transfer out of ICU    All consultants notes reviewed    Julissa Valdes MD  4:23 PM  3/16/2020

## 2020-03-16 NOTE — PROGRESS NOTES
potassium supplementation. Problems resolved:  · Hyperkalemia, multifactorial due to KAYLA, potassium intake and spironolactone. Potassium levels improved. IMPRESSION/RECOMMENDATIONS:      1. KAYLA stage III, established ischemic ATN, Started on RRT yesterday, 3/15. Remains anuric, for HD x 4 hours today. 2. High anion gap metabolic acidosis (renal failure), improving  3. Hypocalcemia 2/2  vitamin D deficiency, to start ergocalciferol  4. HTN, on metoprolol  5. New onset AF, with RVR, on metoprolol, diltiazem drip, unable to anticoagulate ? GIB  6. Right lung pneumonia, procalcitonin 0.70, on ampicillin-sulbactam  7. Shock liver, LFTs continue to improve  8. Thrombocytopenia  9. COPD, methylprednisolone  10.  Anemia, +FOBT, s/p EGD which was normal      Plan:    · Stop IVF  · HD x 4 hours today   · Start ergocalciferol 50,000 units weekly  · Continue to monitor renal function

## 2020-03-16 NOTE — PROGRESS NOTES
ABG:     Lab Results   Component Value Date    PH 7.375 03/13/2020    PCO2 35.9 03/13/2020    PO2 102.6 03/13/2020    HCO3 20.5 03/13/2020    BE -4.1 03/13/2020    THB 11.8 03/13/2020    O2SAT 97.0 03/13/2020        Medications     Current Facility-Administered Medications   Medication Dose Route Frequency Provider Last Rate Last Dose    famotidine (PEPCID) tablet 20 mg  20 mg Oral Daily Karl Ron MD   20 mg at 03/16/20 5468    [START ON 4/25/1690] folic acid (FOLVITE) tablet 1 mg  1 mg Oral Daily MD John Cohn [START ON 3/17/2020] vitamin B-1 (THIAMINE) tablet 100 mg  100 mg Oral Daily Samantha Hoffman MD        hydrALAZINE (APRESOLINE) tablet 50 mg  50 mg Oral 3 times per day Guadalupe Alford MD        hydrALAZINE (APRESOLINE) injection 5 mg  5 mg Intravenous Q4H PRN Thomas Turk MD   5 mg at 03/16/20 0945    dextrose 5 % and 0.9 % sodium chloride infusion   Intravenous Continuous Nikita Galarza MD 40 mL/hr at 03/16/20 0829      anticoagulant sodium citrate 4 GM/100ML solution 0.104 g  2.6 mL Intracatheter PRN Nikita Galarza MD   0.104 g at 03/15/20 1224    methylPREDNISolone sodium (SOLU-MEDROL) injection 40 mg  40 mg Intravenous Q8H Guadalupe Alford MD   40 mg at 03/16/20 1048    ampicillin-sulbactam (UNASYN) 3 g ivpb minibag  3 g Intravenous Q24H Guadalupe Alford MD   Stopped at 03/16/20 0317    ferrous sulfate (IRON 325) tablet 325 mg  325 mg Oral BID WC Guadalupe Alford MD   325 mg at 03/16/20 1386    metoprolol succinate (TOPROL XL) extended release tablet 50 mg  50 mg Oral BID SAMUEL Gillespie - CNP   50 mg at 03/16/20 5071    sodium chloride flush 0.9 % injection 10 mL  10 mL Intravenous 2 times per day Jatinder Lacy MD   10 mL at 03/16/20 5520    sodium chloride flush 0.9 % injection 10 mL  10 mL Intravenous PRN Jatinder Lacy MD        acetaminophen (TYLENOL) tablet 650 mg  650 mg Oral Q6H PRN Jatinder Lacy MD        Or   John Lopez potassium  ·   High anion gap metabolic acidosis   · Normal lactic acid  · Minimal increase in serum osmolar gap       Normocytic anemia  · Low iron and iron saturation with normal ferritin  · Normal folate and B12  ·   Thrombocytopenia  · INR 1.4 APTT 27.5   · Follow up [address], DO, MPH  Professor of Medicine

## 2020-03-16 NOTE — PROGRESS NOTES
succinate (TOPROL XL) extended release tablet 50 mg, 50 mg, Oral, BID, Vidal Carlos, APRN - CNP, 50 mg at 03/16/20 6334    sodium chloride flush 0.9 % injection 10 mL, 10 mL, Intravenous, 2 times per day, Luz Marina Madrid MD, 10 mL at 03/16/20 2602    sodium chloride flush 0.9 % injection 10 mL, 10 mL, Intravenous, PRN, Luz Marina Madrid MD    acetaminophen (TYLENOL) tablet 650 mg, 650 mg, Oral, Q6H PRN **OR** acetaminophen (TYLENOL) suppository 650 mg, 650 mg, Rectal, Q6H PRN, Luz Marina Madrid MD    polyethylene glycol [facility]) packet 17 g, 17 g, Oral, Daily PRN, Luz Marina Madrid MD    promethazine (PHENERGAN) tablet 12.5 mg, 12.5 mg, Oral, Q6H PRN, 12.5 mg at 03/14/20 0143 **OR** ondansetron (ZOFRAN) injection 4 mg, 4 mg, Intravenous, Q6H PRN, Luz Marina Madrid MD    ipratropium-albuterol (DUONEB) nebulizer solution 1 ampule, 1 ampule, Inhalation, Q4H WA, Luz Marina Madrid MD, 1 ampule at 03/16/20 0845    budesonide (PULMICORT) nebulizer suspension 250 mcg, 250 mcg, Nebulization, BID, Luz Marina Madrid MD, Stopped at 03/16/20 0900    [COMPLETED] insulin regular (HUMULIN R;NOVOLIN R) injection 10 Units, 10 Units, Intravenous, Once, 10 Units at 03/14/20 0006 **AND** dextrose 50 % IV solution, 25 g, Intravenous, Once **AND** POCT Glucose, , , Q30 Min **AND** POCT Glucose, , , Q1H **AND** [CANCELED] POCT Glucose, , , 4x Daily AC & HS, Alireza Tomlinson MD    glucose (GLUTOSE) 40 % oral gel 15 g, 15 g, Oral, PRN, Alireza Tomlinson MD    dextrose 50 % IV solution, 12.5 g, Intravenous, PRN, Alireza Tomlinson MD    glucagon (rDNA) injection 1 mg, 1 mg, Intramuscular, PRN, Alireza Tomlinson MD    dextrose 5 % solution, 100 mL/hr, Intravenous, PRN, Alireza Tomlinson MD    sodium polystyrene (KAYEXALATE) 15 GM/60ML suspension 15 g, 15 g, Oral, Once, Alireza Tomlinson MD    glucose (GLUTOSE) 40 % oral gel 15 g, 15 g, Oral, PRN, Alireza Tomlinson MD    dextrose 50 % IV solution, 12.5 controlled  3. KAYLA/ATN/hyperkalemia/CKD. Newly on dialysis  4. Acute hypoxic respiratory failure/COPD exacerbation/pneumonia  5. Mildly elevated troponin, likely demand ischemia in the setting of the above  6. Anemia, as well as EGD with no acute findings. Hgb 10.9  7. Thrombocytopenia platelets 462  8. Comorbid disease: Tobacco abuse, COPD, transaminitis, history of alcohol abuse    RECOMMENDATIONS:     Continue metoprolol succinate to hopefully maintain sinus rhythm   Recommend adding amlodipine for blood pressure   Continue hydralazine   Echocardiogram pending   Would be hesitant to initiate anticoagulation given anemia, thrombocytopenia, alcohol abuse, renal failure, abnormal LFTs/acute hepatitis. Has bled score at least 5   Ischemic evaluation at some point   Fluids/HD per nephrology   Critical care per ICU team    Ketna Nagy MD  [facility]) Cardiology    NOTE: This report was transcribed using voice recognition software. Every effort was made to ensure accuracy; however, inadvertent computerized transcription errors may be present.

## 2020-03-16 NOTE — PROGRESS NOTES
on 3 L SPO2 post session 92% on 3 L   Nursing administered medication for BP prior to mobility     Functional Status Score-Intensive Care Unit (FSS-ICU)   Rolling 5/7   Supine to sit transfer 5/7   Unsupported sitting  5/7   Sit to stand transfers 4/7   Ambulation 1/7   Total  20/35     Therapeutic Exercises:     BLE AROM at EOB     Patient education  Pt educated on safety during functional mobility, safety during STS and transfers, importance of mobility, PLB     Patient response to education:   Pt verbalized understanding Pt demonstrated skill Pt requires further education in this area   Yes  With cues  Yes      ASSESSMENT:    Comments:  Pt received supine and agreeable to PT evaluation with OT collaboration. Pt cleared for participation by RN prior to session. Vitals monitored during session. Pt states she does not walk at home and cannot walk despite requiring low level of assistance for mobility during this session. Pt able to bring herself to EOB. Vitals monitored closely. BP assessed multiple times at EOB. Pt recovered from 190s SBP to low 170s prior to STS. Performed STS and pivot to bedside chair with minimal assistance. Performed second STS to place waffle cushion and chair alarm on chair. Pt left with call button in reach, lines attached, and needs met. Discussed pt case at interdisciplinary rounds in AM.  RN updated. Treatment:  Patient practiced and was instructed in the following treatment:     Bed mobility training - pt given verbal and tactile cues to facilitate proper sequencing and safety during rolling and supine>sit as well as provided with physical assistance to complete task     STS x2 reps (bed and chair), pivot transfer to chair with [employer], cues for deep breathing and sequencing    Waffle cushion and chair alarm provided for chair     Pt's/ family goals   1. Home     Patient and or family understand(s) diagnosis, prognosis, and plan of care.   Yes     PLAN:    PT care will be

## 2020-03-16 NOTE — PROGRESS NOTES
Occupational Therapy  OCCUPATIONAL THERAPY INITIAL EVALUATION      Date:3/16/2020  Patient Name: Sumit Richards  MRN: [id number]  : 1950  Room: [address] [place] [address]    Referring Provider: Leonor Simmons MD    Evaluating OT: Hugo Garcia OTR/L [id number]    AM-PAC Daily Activity Raw Score: 1824    Recommended Adaptive Equipment: Continue to assess for bathroom DME (shower chair, BSC), ww    Comments: Based on patient's functional performance as stated above and level of assistance needed prior to admission, this therapist believes that the patient would benefit from continued skilled OT during/following hospital stay in an effort to increase safety, functional independence with ADLs/IADLs, and quality of life. Reason for admission: weakness  Diagnosis: Hyperglycemia  Procedure: none   Pertinent Medical History: end-stage COPD  Precautions:  Falls, oxygen, chair alarm     Home Living: Pt lives with son (and reports friend lives next door) in a 1 story home with 4-5 step(s) to enter and 1 rail(s); bed/bath and laundry on 1st floor  Bathroom setup: Pt using walk-in shower with grab bar; standard toilet  Equipment owned: BSC vs bed pan? Ww, cane    Prior Level of Function: Assist from son with ADLs/IADLs; reports she was walking minimally in the home d/t \"feet collapsing on me\"; reports she was using BSC vc bedpan at bedpan d/t limited mobility. At baseline, use of 4L oxygen  Driving: no      Support/assistance available at discharge: reports son does not work, supportive friend who lives next door    Pain Level: 0/10   Communication: G ability to express needs. Cognition: A&O: 4/4; Follows 1-2 step directions with cues for redirection; F+ Attention to task.    Memory: Long term- F+  Short term-F+  Questionable historian at times  Initiation/termination: G   Sequencing: G              Comprehension: G   Problem solving: F   Judgement/safety: F     RASS: 0  CAM-ICU: NT     Functional Assessment:   Initial Eval Status  Date: 3/16/20 Treatment Status  Date: STG = LTG  Time frame: 5-7 days   Feeding Setup  Seated in bedside arm chair with breakfast     Indep  Sitting upright in chair for majority of meals   Grooming S; setup  Seated level only d/t decreased standing activity tolerance     Mod I   while seated;  / standing with device prn; G BUE functional use     UB Dressing Min A  Donning gown around back like robe     Mod I  while seated; including clothing retrieval;    G BUE functional use   LB Dressing Min A  SBA for sitting balance EOB with cues to cross leg over opposite to florence socks; Min A standing balance    Mod I   with AE/device PRN   Bathing UB-  SBA  LB-  Min A simulation    UB-  Indep  LB-  Mod I with AE/DME prn   Toileting Min A  For dynamic balance- recommend BSC     Mod I  including clothing mgmt and toileting hygiene using DME/device prn   Bed Mobility  Supine to sit: SBA  Sit to supine: NT  Rolling: NT   Indep   in prep for EOB ADL tasks   Functional/  Bathroom  Transfers Sit to stand: Min A  Stand to sit: Min A  Stand pivot: Min A ww  Safety cues for  Hand placement, ww positioning, safety with lines   Mod I  from varying surfaces using device/DME prn with G safety   Functional Mobility Min A  Few steps from bedside to chair using ww   Mod I   Household distance using device prn   Balance Sitting:     Static:  SBA    Dynamic: SBA  Standing:     Static:  SBA    Dynamic:Min A ww  demo Indep dynamic sitting balance EOB during ADL tasks; Mod I dynamic standing balance during ADL tasks using device prn   Endurance/  Activity Tolerance F activity tolerance/endurance during light ADL task     Sitting EOB tolerance >10 min; on 3L, SpO2 89-92%.  Max cues for proper breathing techniques, pacing and rest breaks    Standing tolerance 1-2 minutes   demo G activity tolerance/endurance during [id number] min ADL task     G demo of EC, pacing and proper breathing techniques   Visual/  Perceptual Glasses:  none Safety F      G    BUE strength/endurance See below       Hand dominance: right      Strength ROM  Comments   RUE  4/5 WFL G   G FMC/dexterity noted during ADL tasks   LUE 4/5 WFL G   G FMC/dexterity noted during ADL tasks      Hearing: WNL   Sensation:   Pt denies any numbness or tingling in BUE/BLE  Tone: WNL  Edema: Unremarkable    Vitals:   BP at rest:  193/98, 196/95 BP at end of session: 173/73   HR at rest: 73 bpm HR at end of session: 77 bpm   Spo2 at rest: 94% 3L  Spo2 at end of session:  90% 3L   EOB /81 after RN provided BP medications bed level (prior to OOB activity)    Comments:  OK from RN to see patient. Evaluation completed with PT collaboration. Upon arrival patient supine with HOB slightly elevated; agreeable to session. See above for cognition/communication skills and status with ADLs and bed/functional mobility. After session, patient left sitting up in arm chair with all devices within reach, all lines and tubes intact, nursing notified of status. Recommend BSC. Chair alarm and waffle cushion placed on chair. Pt required cues and education as noted above for safe facilitation and completion of tasks. Therapist provided skilled monitoring of HR, O2 saturation, blood pressure and patient's response during treatment session. Prior to and at the end of session, environmental modifications/line management completed for patients safety and efficiency of treatment session. Overall, patient demonstrates mild difficulties with completion of BADLs/IADLs and overall functional mobility. Factors contributing to these difficulties include decreased endurance/activity tolerance, SOB/decreased Spo2 with activity requiring multiple rest breaks to focus on proper breathing techniques/pacing, decreased dyn standing balance and generalized weakness (BLE>UE). Required co-evaluation with PT due to medical acuity and  line mgmt required.  As noted above, patient likely to benefit from further OT intervention to increase independence, safety, and overall quality of life. Treatment:   · Bed mobility: Facilitated bed mobility with cues for proper body mechanics, pacing and sequencing to prepare for ADL completion. · Functional transfers/mobility: Facilitated STS/transfers from various surfaces (EOB) with cues for body alignment, safety, pacing and hand placement  · ADL completion: Self-care retraining for the above-mentioned ADLs; training on proper hand placement, safety techniques, sequencing, and energy conservation/compensatory techniques. · Additional education provided on: OT POC, OT role, importance of EOB/OOB activity and completing ADL tasks daily as IND as possible to aide in recovery process, fall risk precautions/call light use, proper breathing/pacing and EC techniques           Eval Complexity:   · Mod Complexity  · History: Expanded review of medical records and additional review of physical, cognitive, or psychosocial history related to current functional performance  · Exam: 3+ performance deficits  · Assistance/Modification: Min/mod assistance or modifications required to perform tasks. May have comorbidities that affect occupational performance.     Assessment of current deficits:   Functional mobility [x]  ADLs [x] Strength [x]  Cognition [x]  Functional transfers  [x] IADLs [x] Safety Awareness [x]  Endurance [x]  Fine Motor Coordination [] Balance [x] Vision/perception [] Sensation []   Gross Motor Coordination [] ROM [] Delirium []                  Motor Control []    Plan of Care: 1-3x/week prn  ADL retraining [x]   Equipment needs [x]   Neuromuscular re-education [x] Energy Conservation Techniques [x]  Functional Transfer training [x] Patient and/or Family Education [x]  Functional Mobility training [x]  Environmental Modifications [x]  Cognitive re-training [x]   Compensatory techniques for ADLs [x]  Splinting Needs []   Positioning to improve overall function [x]   Therapeutic

## 2020-03-17 ENCOUNTER — APPOINTMENT (OUTPATIENT)
Dept: GENERAL RADIOLOGY | Age: 70
DRG: 673 | End: 2020-03-17
Attending: INTERNAL MEDICINE
Payer: COMMERCIAL

## 2020-03-17 ENCOUNTER — ANESTHESIA EVENT (OUTPATIENT)
Dept: OPERATING ROOM | Age: 70
DRG: 673 | End: 2020-03-17
Payer: COMMERCIAL

## 2020-03-17 ENCOUNTER — APPOINTMENT (OUTPATIENT)
Dept: CT IMAGING | Age: 70
DRG: 673 | End: 2020-03-17
Attending: INTERNAL MEDICINE
Payer: COMMERCIAL

## 2020-03-17 ENCOUNTER — APPOINTMENT (OUTPATIENT)
Dept: ULTRASOUND IMAGING | Age: 70
DRG: 673 | End: 2020-03-17
Attending: INTERNAL MEDICINE
Payer: COMMERCIAL

## 2020-03-17 LAB
AADO2: 84.8 MMHG
ABO/RH: NORMAL
ALBUMIN SERPL-MCNC: 2.4 G/DL (ref 3.5–5.2)
ALP BLD-CCNC: 66 U/L (ref 35–104)
ALPHA-1 ANTITRYPSIN: 213 MG/DL (ref 90–200)
ALT SERPL-CCNC: 282 U/L (ref 0–32)
AMMONIA: 18 UMOL/L (ref 11–51)
AMMONIA: 22 UMOL/L (ref 11–51)
ANION GAP SERPL CALCULATED.3IONS-SCNC: 13 MMOL/L (ref 7–16)
ANISOCYTOSIS: ABNORMAL
ANISOCYTOSIS: ABNORMAL
ANTI-NUCLEAR ANTIBODY (ANA): NEGATIVE
ANTIBODY SCREEN: NORMAL
AST SERPL-CCNC: 105 U/L (ref 0–31)
B.E.: -1.6 MMOL/L (ref -3–3)
BASOPHILIC STIPPLING: ABNORMAL
BASOPHILS ABSOLUTE: 0 E9/L (ref 0–0.2)
BASOPHILS ABSOLUTE: 0 E9/L (ref 0–0.2)
BASOPHILS RELATIVE PERCENT: 0.1 % (ref 0–2)
BASOPHILS RELATIVE PERCENT: 0.1 % (ref 0–2)
BILIRUB SERPL-MCNC: 0.5 MG/DL (ref 0–1.2)
BUN BLDV-MCNC: 31 MG/DL (ref 8–23)
BURR CELLS: ABNORMAL
BURR CELLS: ABNORMAL
CALCIUM IONIZED: 1.09 MMOL/L (ref 1.15–1.33)
CALCIUM SERPL-MCNC: 7.3 MG/DL (ref 8.6–10.2)
CHLORIDE BLD-SCNC: 98 MMOL/L (ref 98–107)
CO2: 21 MMOL/L (ref 22–29)
COHB: 0.2 % (ref 0–1.5)
COLLAGEN ADENOSINE-5'-DIPHOSPHATE (ADP) TIME: >300 SEC (ref 48–103)
COLLAGEN EPINEPHRINE TIME: 200 SEC (ref 83–176)
CREAT SERPL-MCNC: 2.9 MG/DL (ref 0.5–1)
CRITICAL: ABNORMAL
DATE ANALYZED: ABNORMAL
DATE OF COLLECTION: ABNORMAL
EOSINOPHILS ABSOLUTE: 0 E9/L (ref 0.05–0.5)
EOSINOPHILS ABSOLUTE: 0 E9/L (ref 0.05–0.5)
EOSINOPHILS RELATIVE PERCENT: 0 % (ref 0–6)
EOSINOPHILS RELATIVE PERCENT: 0 % (ref 0–6)
EPSTEIN-BARR VCA IGM: <10 U/ML (ref 0–43.9)
FIO2: 40 %
GFR AFRICAN AMERICAN: 19
GFR NON-AFRICAN AMERICAN: 16 ML/MIN/1.73
GLUCOSE BLD-MCNC: 104 MG/DL (ref 74–99)
HCO3: 22 MMOL/L (ref 22–26)
HCT VFR BLD CALC: 24.2 % (ref 34–48)
HCT VFR BLD CALC: 25.2 % (ref 34–48)
HCT VFR BLD CALC: 26.2 % (ref 34–48)
HCT VFR BLD CALC: 26.2 % (ref 34–48)
HEMOGLOBIN: 7.4 G/DL (ref 11.5–15.5)
HEMOGLOBIN: 8.1 G/DL (ref 11.5–15.5)
HEMOGLOBIN: 8.3 G/DL (ref 11.5–15.5)
HEMOGLOBIN: 8.5 G/DL (ref 11.5–15.5)
HHB: 1.2 % (ref 0–5)
LAB: ABNORMAL
LACTIC ACID: 0.8 MMOL/L (ref 0.5–2.2)
LACTIC ACID: 1 MMOL/L (ref 0.5–2.2)
LACTIC ACID: 1.2 MMOL/L (ref 0.5–2.2)
LV EF: 58 %
LVEF MODALITY: NORMAL
LYMPHOCYTES ABSOLUTE: 0.11 E9/L (ref 1.5–4)
LYMPHOCYTES ABSOLUTE: 0.19 E9/L (ref 1.5–4)
LYMPHOCYTES RELATIVE PERCENT: 0.9 % (ref 20–42)
LYMPHOCYTES RELATIVE PERCENT: 1.7 % (ref 20–42)
Lab: ABNORMAL
MCH RBC QN AUTO: 29.6 PG (ref 26–35)
MCH RBC QN AUTO: 30.1 PG (ref 26–35)
MCHC RBC AUTO-ENTMCNC: 30.6 % (ref 32–34.5)
MCHC RBC AUTO-ENTMCNC: 32.1 % (ref 32–34.5)
MCV RBC AUTO: 92 FL (ref 80–99.9)
MCV RBC AUTO: 98.4 FL (ref 80–99.9)
METHB: 0.4 % (ref 0–1.5)
MODE: AC
MONOCYTES ABSOLUTE: 0.38 E9/L (ref 0.1–0.95)
MONOCYTES ABSOLUTE: 0.96 E9/L (ref 0.1–0.95)
MONOCYTES RELATIVE PERCENT: 4.3 % (ref 2–12)
MONOCYTES RELATIVE PERCENT: 8.7 % (ref 2–12)
NEUTROPHILS ABSOLUTE: 9.02 E9/L (ref 1.8–7.3)
NEUTROPHILS ABSOLUTE: 9.63 E9/L (ref 1.8–7.3)
NEUTROPHILS RELATIVE PERCENT: 90.4 % (ref 43–80)
NEUTROPHILS RELATIVE PERCENT: 93.9 % (ref 43–80)
NUCLEATED RED BLOOD CELLS: 5.2 /100 WBC
NUCLEATED RED BLOOD CELLS: 6.1 /100 WBC
O2 CONTENT: 13.3 ML/DL
O2 SATURATION: 98.8 % (ref 92–98.5)
O2HB: 98.2 % (ref 94–97)
OPERATOR ID: 1874
OVALOCYTES: ABNORMAL
PATIENT TEMP: 37 C
PCO2: 32.8 MMHG (ref 35–45)
PDW BLD-RTO: 15.4 FL (ref 11.5–15)
PDW BLD-RTO: 15.6 FL (ref 11.5–15)
PEEP/CPAP: 5 CMH2O
PFO2: 3.82 MMHG/%
PH BLOOD GAS: 7.44 (ref 7.35–7.45)
PLATELET # BLD: 67 E9/L (ref 130–450)
PLATELET # BLD: 95 E9/L (ref 130–450)
PLATELET CONFIRMATION: NORMAL
PLATELET CONFIRMATION: NORMAL
PMV BLD AUTO: 11.4 FL (ref 7–12)
PMV BLD AUTO: 12.2 FL (ref 7–12)
PO2: 152.7 MMHG (ref 75–100)
POIKILOCYTES: ABNORMAL
POLYCHROMASIA: ABNORMAL
POLYCHROMASIA: ABNORMAL
POTASSIUM SERPL-SCNC: 4.2 MMOL/L (ref 3.5–5)
RBC # BLD: 2.46 E12/L (ref 3.5–5.5)
RBC # BLD: 2.74 E12/L (ref 3.5–5.5)
RI(T): 0.56
RR MECHANICAL: 14 B/MIN
SCHISTOCYTES: ABNORMAL
SCHISTOCYTES: ABNORMAL
SODIUM BLD-SCNC: 132 MMOL/L (ref 132–146)
SOURCE, BLOOD GAS: ABNORMAL
THB: 9.4 G/DL (ref 11.5–16.5)
TIME ANALYZED: 601
TOTAL PROTEIN: 3.9 G/DL (ref 6.4–8.3)
VT MECHANICAL: 450 ML
WBC # BLD: 10.7 E9/L (ref 4.5–11.5)
WBC # BLD: 9.6 E9/L (ref 4.5–11.5)

## 2020-03-17 PROCEDURE — 93971 EXTREMITY STUDY: CPT

## 2020-03-17 PROCEDURE — 36592 COLLECT BLOOD FROM PICC: CPT

## 2020-03-17 PROCEDURE — 6360000002 HC RX W HCPCS: Performed by: INTERNAL MEDICINE

## 2020-03-17 PROCEDURE — 2500000003 HC RX 250 WO HCPCS: Performed by: INTERNAL MEDICINE

## 2020-03-17 PROCEDURE — 93306 TTE W/DOPPLER COMPLETE: CPT

## 2020-03-17 PROCEDURE — 71045 X-RAY EXAM CHEST 1 VIEW: CPT

## 2020-03-17 PROCEDURE — 82140 ASSAY OF AMMONIA: CPT

## 2020-03-17 PROCEDURE — 6370000000 HC RX 637 (ALT 250 FOR IP): Performed by: SURGERY

## 2020-03-17 PROCEDURE — 83605 ASSAY OF LACTIC ACID: CPT

## 2020-03-17 PROCEDURE — 93926 LOWER EXTREMITY STUDY: CPT

## 2020-03-17 PROCEDURE — 94640 AIRWAY INHALATION TREATMENT: CPT

## 2020-03-17 PROCEDURE — 2580000003 HC RX 258: Performed by: INTERNAL MEDICINE

## 2020-03-17 PROCEDURE — 6360000002 HC RX W HCPCS: Performed by: SURGERY

## 2020-03-17 PROCEDURE — 85018 HEMOGLOBIN: CPT

## 2020-03-17 PROCEDURE — 36556 INSERT NON-TUNNEL CV CATH: CPT

## 2020-03-17 PROCEDURE — 86850 RBC ANTIBODY SCREEN: CPT

## 2020-03-17 PROCEDURE — 2000000000 HC ICU R&B

## 2020-03-17 PROCEDURE — 99233 SBSQ HOSP IP/OBS HIGH 50: CPT | Performed by: INTERNAL MEDICINE

## 2020-03-17 PROCEDURE — 94003 VENT MGMT INPAT SUBQ DAY: CPT

## 2020-03-17 PROCEDURE — 85576 BLOOD PLATELET AGGREGATION: CPT

## 2020-03-17 PROCEDURE — 99291 CRITICAL CARE FIRST HOUR: CPT | Performed by: INTERNAL MEDICINE

## 2020-03-17 PROCEDURE — 36415 COLL VENOUS BLD VENIPUNCTURE: CPT

## 2020-03-17 PROCEDURE — 02HV33Z INSERTION OF INFUSION DEVICE INTO SUPERIOR VENA CAVA, PERCUTANEOUS APPROACH: ICD-10-PCS | Performed by: INTERNAL MEDICINE

## 2020-03-17 PROCEDURE — 86901 BLOOD TYPING SEROLOGIC RH(D): CPT

## 2020-03-17 PROCEDURE — P9016 RBC LEUKOCYTES REDUCED: HCPCS

## 2020-03-17 PROCEDURE — 70450 CT HEAD/BRAIN W/O DYE: CPT

## 2020-03-17 PROCEDURE — 85025 COMPLETE CBC W/AUTO DIFF WBC: CPT

## 2020-03-17 PROCEDURE — 6360000002 HC RX W HCPCS: Performed by: STUDENT IN AN ORGANIZED HEALTH CARE EDUCATION/TRAINING PROGRAM

## 2020-03-17 PROCEDURE — 36430 TRANSFUSION BLD/BLD COMPNT: CPT

## 2020-03-17 PROCEDURE — 6370000000 HC RX 637 (ALT 250 FOR IP): Performed by: STUDENT IN AN ORGANIZED HEALTH CARE EDUCATION/TRAINING PROGRAM

## 2020-03-17 PROCEDURE — 86923 COMPATIBILITY TEST ELECTRIC: CPT

## 2020-03-17 PROCEDURE — 6370000000 HC RX 637 (ALT 250 FOR IP): Performed by: INTERNAL MEDICINE

## 2020-03-17 PROCEDURE — 86900 BLOOD TYPING SEROLOGIC ABO: CPT

## 2020-03-17 PROCEDURE — C9113 INJ PANTOPRAZOLE SODIUM, VIA: HCPCS | Performed by: STUDENT IN AN ORGANIZED HEALTH CARE EDUCATION/TRAINING PROGRAM

## 2020-03-17 PROCEDURE — 80053 COMPREHEN METABOLIC PANEL: CPT

## 2020-03-17 PROCEDURE — 82805 BLOOD GASES W/O2 SATURATION: CPT

## 2020-03-17 PROCEDURE — 2580000003 HC RX 258: Performed by: SURGERY

## 2020-03-17 PROCEDURE — 73502 X-RAY EXAM HIP UNI 2-3 VIEWS: CPT

## 2020-03-17 PROCEDURE — 85014 HEMATOCRIT: CPT

## 2020-03-17 PROCEDURE — 2500000003 HC RX 250 WO HCPCS: Performed by: STUDENT IN AN ORGANIZED HEALTH CARE EDUCATION/TRAINING PROGRAM

## 2020-03-17 RX ORDER — SODIUM CHLORIDE 0.9 % (FLUSH) 0.9 %
10 SYRINGE (ML) INJECTION PRN
Status: DISCONTINUED | OUTPATIENT
Start: 2020-03-17 | End: 2020-03-26 | Stop reason: HOSPADM

## 2020-03-17 RX ORDER — PROPOFOL 10 MG/ML
10 INJECTION, EMULSION INTRAVENOUS
Status: DISCONTINUED | OUTPATIENT
Start: 2020-03-17 | End: 2020-03-18 | Stop reason: ALTCHOICE

## 2020-03-17 RX ORDER — MIDAZOLAM HYDROCHLORIDE 1 MG/ML
1 INJECTION INTRAMUSCULAR; INTRAVENOUS EVERY 4 HOURS PRN
Status: DISCONTINUED | OUTPATIENT
Start: 2020-03-17 | End: 2020-03-19

## 2020-03-17 RX ORDER — PROMETHAZINE HYDROCHLORIDE 25 MG/1
12.5 TABLET ORAL EVERY 6 HOURS PRN
Status: DISCONTINUED | OUTPATIENT
Start: 2020-03-17 | End: 2020-03-21

## 2020-03-17 RX ORDER — THIAMINE HYDROCHLORIDE 100 MG/ML
100 INJECTION, SOLUTION INTRAMUSCULAR; INTRAVENOUS DAILY
Status: DISCONTINUED | OUTPATIENT
Start: 2020-03-17 | End: 2020-03-21

## 2020-03-17 RX ORDER — POLYETHYLENE GLYCOL 3350 17 G/17G
17 POWDER, FOR SOLUTION ORAL DAILY PRN
Status: DISCONTINUED | OUTPATIENT
Start: 2020-03-17 | End: 2020-03-21

## 2020-03-17 RX ORDER — SODIUM CHLORIDE 0.9 % (FLUSH) 0.9 %
10 SYRINGE (ML) INJECTION EVERY 12 HOURS SCHEDULED
Status: DISCONTINUED | OUTPATIENT
Start: 2020-03-17 | End: 2020-03-26 | Stop reason: HOSPADM

## 2020-03-17 RX ORDER — ONDANSETRON 2 MG/ML
4 INJECTION INTRAMUSCULAR; INTRAVENOUS EVERY 6 HOURS PRN
Status: DISCONTINUED | OUTPATIENT
Start: 2020-03-17 | End: 2020-03-26 | Stop reason: HOSPADM

## 2020-03-17 RX ORDER — 0.9 % SODIUM CHLORIDE 0.9 %
20 INTRAVENOUS SOLUTION INTRAVENOUS ONCE
Status: COMPLETED | OUTPATIENT
Start: 2020-03-17 | End: 2020-03-17

## 2020-03-17 RX ORDER — CHLORHEXIDINE GLUCONATE 0.12 MG/ML
15 RINSE ORAL 2 TIMES DAILY
Status: DISCONTINUED | OUTPATIENT
Start: 2020-03-17 | End: 2020-03-21

## 2020-03-17 RX ORDER — ACETAMINOPHEN 325 MG/1
650 TABLET ORAL EVERY 6 HOURS PRN
Status: DISCONTINUED | OUTPATIENT
Start: 2020-03-17 | End: 2020-03-26 | Stop reason: HOSPADM

## 2020-03-17 RX ORDER — HEPARIN SODIUM 10000 [USP'U]/ML
5000 INJECTION, SOLUTION INTRAVENOUS; SUBCUTANEOUS EVERY 8 HOURS SCHEDULED
Status: DISCONTINUED | OUTPATIENT
Start: 2020-03-17 | End: 2020-03-26 | Stop reason: HOSPADM

## 2020-03-17 RX ORDER — FOLIC ACID 5 MG/ML
1 INJECTION, SOLUTION INTRAMUSCULAR; INTRAVENOUS; SUBCUTANEOUS DAILY
Status: DISCONTINUED | OUTPATIENT
Start: 2020-03-17 | End: 2020-03-21

## 2020-03-17 RX ORDER — ACETAMINOPHEN 650 MG/1
650 SUPPOSITORY RECTAL EVERY 6 HOURS PRN
Status: DISCONTINUED | OUTPATIENT
Start: 2020-03-17 | End: 2020-03-26 | Stop reason: HOSPADM

## 2020-03-17 RX ORDER — PANTOPRAZOLE SODIUM 40 MG/10ML
40 INJECTION, POWDER, LYOPHILIZED, FOR SOLUTION INTRAVENOUS DAILY
Status: DISCONTINUED | OUTPATIENT
Start: 2020-03-17 | End: 2020-03-21

## 2020-03-17 RX ORDER — 0.9 % SODIUM CHLORIDE 0.9 %
1000 INTRAVENOUS SOLUTION INTRAVENOUS ONCE
Status: COMPLETED | OUTPATIENT
Start: 2020-03-17 | End: 2020-03-17

## 2020-03-17 RX ADMIN — IPRATROPIUM BROMIDE AND ALBUTEROL SULFATE 1 AMPULE: 2.5; .5 SOLUTION RESPIRATORY (INHALATION) at 20:33

## 2020-03-17 RX ADMIN — PROPOFOL 10 MCG/KG/MIN: 10 INJECTION, EMULSION INTRAVENOUS at 00:00

## 2020-03-17 RX ADMIN — SODIUM CHLORIDE 1000 ML: 9 INJECTION, SOLUTION INTRAVENOUS at 00:28

## 2020-03-17 RX ADMIN — BUDESONIDE 250 MCG: 0.25 SUSPENSION RESPIRATORY (INHALATION) at 20:34

## 2020-03-17 RX ADMIN — SODIUM CHLORIDE 20 ML: 9 INJECTION, SOLUTION INTRAVENOUS at 03:10

## 2020-03-17 RX ADMIN — MIDAZOLAM 1 MG/HR: 5 INJECTION INTRAMUSCULAR; INTRAVENOUS at 00:25

## 2020-03-17 RX ADMIN — CHLORHEXIDINE GLUCONATE 0.12% ORAL RINSE 15 ML: 1.2 LIQUID ORAL at 20:58

## 2020-03-17 RX ADMIN — PREDNISONE 20 MG: 20 TABLET ORAL at 10:34

## 2020-03-17 RX ADMIN — Medication 10 ML: at 10:38

## 2020-03-17 RX ADMIN — THIAMINE HYDROCHLORIDE 100 MG: 100 INJECTION, SOLUTION INTRAMUSCULAR; INTRAVENOUS at 10:34

## 2020-03-17 RX ADMIN — IPRATROPIUM BROMIDE AND ALBUTEROL SULFATE 1 AMPULE: 2.5; .5 SOLUTION RESPIRATORY (INHALATION) at 12:34

## 2020-03-17 RX ADMIN — IPRATROPIUM BROMIDE AND ALBUTEROL SULFATE 1 AMPULE: 2.5; .5 SOLUTION RESPIRATORY (INHALATION) at 08:33

## 2020-03-17 RX ADMIN — FOLIC ACID 1 MG: 5 INJECTION, SOLUTION INTRAMUSCULAR; INTRAVENOUS; SUBCUTANEOUS at 12:25

## 2020-03-17 RX ADMIN — Medication 10 ML: at 20:58

## 2020-03-17 RX ADMIN — AMPICILLIN SODIUM AND SULBACTAM SODIUM 3 G: 2; 1 INJECTION, POWDER, FOR SOLUTION INTRAMUSCULAR; INTRAVENOUS at 06:01

## 2020-03-17 RX ADMIN — BUDESONIDE 250 MCG: 0.25 SUSPENSION RESPIRATORY (INHALATION) at 08:33

## 2020-03-17 RX ADMIN — IPRATROPIUM BROMIDE AND ALBUTEROL SULFATE 1 AMPULE: 2.5; .5 SOLUTION RESPIRATORY (INHALATION) at 16:00

## 2020-03-17 RX ADMIN — Medication 25 MCG/HR: at 12:25

## 2020-03-17 RX ADMIN — PANTOPRAZOLE SODIUM 40 MG: 40 INJECTION, POWDER, FOR SOLUTION INTRAVENOUS at 10:34

## 2020-03-17 RX ADMIN — CHLORHEXIDINE GLUCONATE 0.12% ORAL RINSE 15 ML: 1.2 LIQUID ORAL at 10:34

## 2020-03-17 ASSESSMENT — PULMONARY FUNCTION TESTS: PIF_VALUE: 25

## 2020-03-17 ASSESSMENT — PAIN SCALES - GENERAL
PAINLEVEL_OUTOF10: 0

## 2020-03-17 NOTE — FLOWSHEET NOTE
Patient reaches for ETT and lines with bilateral upper extremities despite verbal redirection and education. Bilateral soft wrist restraints remain in place for patient safety, will continue to monitor.

## 2020-03-17 NOTE — PROGRESS NOTES
03/17/2020    HCT 25.2 03/17/2020    MCV 92.0 03/17/2020    MCH 29.6 03/17/2020    MCHC 32.1 03/17/2020    RDW 15.4 03/17/2020    PLT 67 03/17/2020    MPV 12.2 03/17/2020     BMP:    Lab Results   Component Value Date     03/17/2020    K 4.2 03/17/2020    K 4.8 03/15/2020    CL 98 03/17/2020    CO2 21 03/17/2020    BUN 31 03/17/2020    LABALBU 2.4 03/17/2020    CREATININE 2.9 03/17/2020    CALCIUM 7.3 03/17/2020    GFRAA 19 03/17/2020    LABGLOM 16 03/17/2020    GLUCOSE 104 03/17/2020      Monitor :  Sinus with pauses occ runs of afib  CXR We reviewed the films during the inter-disciplinary rounds/conference, which showed lines and tubes ok and changes of COPD      Assessment and Plan   Darion Juan is a 68 y.o. female who was brought to the MICU for 1. Atrial fibrillation with RVR, 2. Unable to use [address] in the setting of GIB, 3. Hypertensive emergency --> uncontrolled 186H systolic, 4. KAYLA - nephrology following  --> ESRD on HD now  5. Hyperkalemia, 6. Acute respiratory failure on Bipap, 7. COPD with hx of tobacco use  8. Transaminitis, 9. Hx of ETOH abuse, 10. GIB - melanotic stools and positive FOBT. With negative EGD  IMPRESSION:  1. Atrial fibrillation with RVR -  Currently on cardizem gtt at 5 mg/hr  2. Unable to use [address] in the setting of GIB  3. Hypertensive emergency --> uncontrolled 504C systolic  4. KAYLA - nephrology following  --> ESRD on HD now  5. Hyperkalemia  6. Acute respiratory failure on Bipap  7. COPD with hx of tobacco use  8. Transaminitis  9. Hx of ETOH abuse  10. GIB - melanotic stools and positive FOBT.  Surgery following.      Acute metabolic encephalopathy of unclear etiology (resolved)  · Normal ammonia  · ABG negative for hypercapnia  · Elevated TSH with normal T4  · A a OX3 today  A. fib  · New onset  · Responds well to Lopressor  · Continue Lopressor every 6 hours  · Chads vas >2 but currently GI bleed  · We will hold anticoagulation at this time  · Diltiazem drip stopped due to

## 2020-03-17 NOTE — CONSULTS
FOR SHORTNESS OF BREATH 7/8/19  Yes Historical Provider, MD   potassium citrate (UROCIT-K) 10 MEQ (1080 MG) extended release tablet Take by mouth 3 times daily (with meals)    Historical Provider, MD   potassium chloride (MICRO-K) 10 MEQ extended release capsule Take 20 mEq by mouth daily    Historical Provider, MD   predniSONE (DELTASONE) 20 MG tablet Take 20 mg by mouth daily    Historical Provider, MD       Allergies:  Patient has no known allergies. Social History:   TOBACCO:   reports that she has been smoking cigarettes. She has been smoking about 1.00 pack per day. She does not have any smokeless tobacco history on file. ETOH:   reports current alcohol use. OCCUPATION:      Family History:   No family history on file. REVIEW OF SYSTEMS:  Was not obtained due to the patient's condition. Physical Exam       Physical Exam:  · Vitals: /65   Pulse 76   Temp 99.1 °F (37.3 °C) (Oral)   Resp 16   Ht 5' 1\" (1.549 m)   Wt 97 lb 7.1 oz (44.2 kg)   SpO2 100%   BMI 18.41 kg/m²     · I & O - 24hr: No intake/output data recorded. · General Appearance: alert, appears stated age, cooperative and sedated and intubated. · HEENT:  Head: Normocephalic, no lesions, without obvious abnormality. · Neck: no adenopathy, no carotid bruit, no JVD, supple, symmetrical, trachea midline and thyroid not enlarged, symmetric, no tenderness/mass/nodules  · Lung: Bilateral expiratory wheezes, bilateral basilar crackles. · Heart: regular rate and rhythm, S1, S2 normal, no murmur, click, rub or gallop  · Abdomen: soft, non-tender; bowel sounds normal; no masses,  no organomegaly  · Extremities:  extremities normal, atraumatic, no cyanosis or edema  · Musculokeletal: No joint swelling, no muscle tenderness. ROM normal in all joints of extremities.    · Neurologic: Mental status: Alert, oriented, thought content appropriate    Labs     CBC:   Lab Results   Component Value Date    WBC 10.7 03/17/2020    RBC 2.74 03/17/2020 HGB 8.1 03/17/2020    HCT 25.2 03/17/2020    PLT 67 03/17/2020    MCV 92.0 03/17/2020     BMP:    Lab Results   Component Value Date     03/17/2020    K 4.2 03/17/2020    K 4.8 03/15/2020    CL 98 03/17/2020    CO2 21 03/17/2020    BUN 31 03/17/2020    CREATININE 2.9 03/17/2020    GLUCOSE 104 03/17/2020    CALCIUM 7.3 03/17/2020     Hepatic Function Panel:    Lab Results   Component Value Date    ALKPHOS 66 03/17/2020     03/17/2020     03/17/2020    PROT 3.9 03/17/2020    LABALBU 2.4 03/17/2020    BILITOT 0.5 03/17/2020     Cardiac Enzymes:   Lab Results   Component Value Date    TROPONINI 0.05 (H) 03/14/2020    TROPONINI 0.05 (H) 03/14/2020     PT/INR:    Lab Results   Component Value Date    PROTIME 14.2 03/16/2020    INR 1.3 03/16/2020     ABG:  No results found for: PHART, JBV1RSD, PO2ART, R1RCUJLV, IOE1VFX, BEART  TSH:    Lab Results   Component Value Date    TSH 4.610 03/13/2020        Notable Cultures:       Culture  Date sent  Result    Nasal      Sputum      Body Fluid      Urine Strep pneumonia Ag        Urine Legionella Ag        Blood        Urine          Antibiotic  Days  Day started                                       Oxygen:   Nasal cannula L/min     Face mask %     Reservoirs mask %       ABG   Vent Settings     PH   Mode      PCO2   TV      PO2   RR      HCO3   PS      Sat%   PEEP      FIO2   FIO2        P/F          Lines:  Site  Day  Date inserted     TLC              PICC              Arterial line              Peripheral line                           DVT Prophylaxis      Heparin         Enoxaparin        PCDs        Imaging studies:   Imaging  Date  Result    CXR                       EKG: Rhythm Strip: normal sinus rhythm, unchanged from previous tracings. Resident's Assessment & Plan     Neurology    Sedated on Versed     Acute Encephalopathy 2/2 Alcohol withdrawal    Order CT head, ammonia. Previous workup unremarkable.      Mechanical Fall     Right hip x-ray not remarkable for fractures. Cardiovascular    Paroxysmal atrial fibrillation    CHADSsVASc score is 3, holding anticoagulation. Hemorrhagic shock secondary to blood losses from the site of femoral HD line (likely secondary to malfunctioning platelets due to uremia, compensated liver disease)    Local compression applied at the site of bleeding. Started IV fluids (normal saline). IV axis (central line placed, right IJ), and a peripheral line. Hemoglobin dropped to 7.4, will give unit of RBCs. Order coagulation panel (PTT (WNL), PT (slightly elevated), INR slightly elevated), tEg test (reaction time less than 5). Upper Endoscopy unremarkable 3/15/2020)  Factor VIII level ?, PFA ??, Vitamin C supplement ? ? Elevated lactic acid, 6.0 (will trend every 6)      Pulmonary    Acute hypoxic respiratory failure secondary to aspiration pneumonia    Continue controlling the source, on Unasyn  Daily CXR, clinically ventilated on AC VC mode, ABG and chest x-ray daily. Renal    KAYLA stage III on CKD (ischemic ATN). Nephrology is following, possible placement of either a temporary femoral HD catheter versus a Tessio catheter tomorrow. Had HD for 4 hours yesterday. Infectious Disease    As above. Left Femoral atrial Thrombus. Will follow with US and pulsations  Arterial and venous doppler. Ultrasound of the left femoral.     DVT/GI prophylaxis PCD/PPI         Ophelia Kapadia M.D. , PGY-1  Internal Medicine    Attending Physician: Dr. Margarett Krabbe personally saw, examined and provided care for the patient. Radiographs, labs and medication list were reviewed by me independently. Review of Residents documentation was conducted and revisions were made as appropriate. I agree with the above documented exam, problem list and plan of care. Patient with RRT overnight. Pulled out catheter with profuse bleeding.  Hypotensive and hypoxic, reintubated    -plan for tunneled cath  -sedation for

## 2020-03-17 NOTE — PROCEDURES
Procedure: Right radial arterial line placement. Unable to place R femoral artery (unable to pass guidewire into artery x 2)    Indications: Continuous monitoring of blood pressure in a patient with hypotension    Anesthesia: Local infiltration of 1% lidocaine. Consent:  Emergent procedure    Technique:  Procedure was done using strict aseptic technique. Right radial site was cleaned with chloraprep and draped. Radial artery was identified, then Lidocaine 1% was infiltrated locally. Radial arterial line was inserted, a good blood flow was obtained, after which guidewire was inserted all the way with no resistance. Then the canula was inserted and needle with guidewire was withdrawn. Pulsatile bright red blood flow was observed. The canula was connected to BP monitoring apparatus and a good waveform was noted. Canula was then securely fastened to the skin with sutures and covered with sterile dressing. Patient tolerated the procedure well. Number of sticks: 1. Number of Kits used: 1. Complications: No immediate complication. Estimated blood loss: About 1 ml.        Jatinder Lacy MD, PGY-3  Internal Medicine    Attending: Dr María Freeman

## 2020-03-17 NOTE — PROGRESS NOTES
Stopped (03/17/20 1159)    fentaNYL 5 mcg/ml in 0.9%  ml infusion 25 mcg/hr (03/17/20 1225)    midazolam 3 mg/hr (03/17/20 1159)    dextrose 5 % and 0.9 % NaCl 40 mL/hr at 03/16/20 1811    dextrose      dextrose       PRN Meds:.sodium chloride flush, acetaminophen **OR** acetaminophen, polyethylene glycol, promethazine **OR** ondansetron, midazolam, albuterol sulfate HFA, anticoagulant sodium citrate, glucose, dextrose, glucagon (rDNA), dextrose, glucose, dextrose, glucagon (rDNA), dextrose, perflutren lipid microspheres    DATA:    CBC:   Lab Results   Component Value Date    WBC 10.7 03/17/2020    RBC 2.74 03/17/2020    HGB 8.1 03/17/2020    HCT 25.2 03/17/2020    MCV 92.0 03/17/2020    MCH 29.6 03/17/2020    MCHC 32.1 03/17/2020    RDW 15.4 03/17/2020    PLT 67 03/17/2020    MPV 12.2 03/17/2020     CMP:    Lab Results   Component Value Date     03/17/2020    K 4.2 03/17/2020    K 4.8 03/15/2020    CL 98 03/17/2020    CO2 21 03/17/2020    BUN 31 03/17/2020    CREATININE 2.9 03/17/2020    GFRAA 19 03/17/2020    LABGLOM 16 03/17/2020    GLUCOSE 104 03/17/2020    PROT 3.9 03/17/2020    LABALBU 2.4 03/17/2020    CALCIUM 7.3 03/17/2020    BILITOT 0.5 03/17/2020    ALKPHOS 66 03/17/2020     03/17/2020     03/17/2020     Magnesium:    Lab Results   Component Value Date    MG 2.0 03/16/2020     Phosphorus:    Lab Results   Component Value Date    PHOS 6.0 03/16/2020     Radiology Review:      CT Abdomen and pelvis 3/14/20   1. No acute inflammatory changes omental mesenteric fat planes free   intraperitoneal air, bowel obstruction or ascites. .       2. Fluid contents in the colon can indicate impending diarrhea-like   condition. Chest x-ray March 13, 2020   Tortuous ectatic aorta   Cardiomegaly   Airspace disease compatible with pneumonia, at the right lung base                         Results for Karan Denver (MRN [id number]) as of 3/17/2020 13:31   Ref.  Range 3/14/2020 06:00   Ferritin Latest Units: ng/mL 1,036   Iron Latest Ref Range: 37 - 145 mcg/dL 36 (L)   Iron Saturation Latest Ref Range: 15 - 50 % 13 (L)   TIBC Latest Ref Range: 250 - 450 mcg/dL 269       BRIEF SUMMARY OF INITIAL CONSULT:    Briefly Mrs. Alphonse Gonzalez is a 64-year-old woman with history of HTN, COPD, alcohol abuse, who was admitted on March 13, 2020 transferred [facility] where she initially presented with left leg weakness and was found to have creatinine level of 3.4 m/dL and a potassium level >7 mEq/L. In view of the lack of vascular surgeon available this week and the consideration of need of dialysis with possible need of dialysis catheter placement patient was transferred to this hospital.  Prior to admission patient reports having nausea vomiting and diarrhea. She denies taking NSAIDs. Her medications prior to admission included spironolactone and potassium supplementation. Problems resolved:  · Hyperkalemia, multifactorial due to KAYLA, potassium intake and spironolactone. Potassium levels improved. IMPRESSION/RECOMMENDATIONS:      1. KAYLA stage III, established ischemic ATN, Started on RRT, 3/15. Remains anuric, for HD x 4 hours today. 2. High anion gap metabolic acidosis (renal failure), improving  3. Hypocalcemia 2/2  vitamin D deficiency, to start ergocalciferol  4. HTN, on metoprolol  5. New onset AF, with RVR, on metoprolol, diltiazem drip, unable to anticoagulate ? GIB  6. Right lung pneumonia, procalcitonin 0.70, on ampicillin-sulbactam  7. Shock liver, LFTs continue to improve  8. Thrombocytopenia  9. COPD, methylprednisolone  10. Anemia, +FOBT, s/p EGD which was normal      Plan:    · Vascular follow up noted and appreciated.  She will need a tunneled dialysis cath as there are no signs of renal recovery and she remains oliguric  · HD tomorrow, orders reviewed with the dialysis nurse  · Anemia panel noted, Ferritin is high, no need for IV iron, start Epogen 3000 units SQ three times per

## 2020-03-17 NOTE — PROGRESS NOTES
Intubation Record    Date: 3/16/2020  Time: 2150  Patient identity confirmed: Yes  Indications: called to intubate during RRT   Preoxygenation: BVM with 100% O2   Laryngoscope size and type: glidescope  Airway introducer used: Yes  Evac: Yes  Tube size: 8  Number of attempts :1  Cords visualized:  [x]Clearly [] Poorly   Breath sounds present bilaterally: Yes  ETCO2 28  ETT to lip: 23  Tube secured with: gauze  Chest x-ray ordered: Yes  Difficulties encountered:       Difficult Airway: no difficulties. No trauma to airway or teeth.            Manuel White, LALY

## 2020-03-17 NOTE — PROGRESS NOTES
INPATIENT CARDIOLOGY FOLLOW-UP    Name: Mira Perez    Age: 68 y.o. Date of Admission: 3/13/2020  9:43 PM    Date of Service: 3/17/2020    Primary Cardiologist: Neva Allen    Chief Complaint: Follow-up for atrial fibirillation    Interim History:  Overnight events noted  Patient apparently transferred to the floor yesterday, last evening found on the floor, right femoral hemodialysis line had been pulled and she had significant femoral venous bleeding. Per documentation there is brief A. fib with RVR noted. She was intubated, bleeding was controlled, and she was brought back to the ICU. She is intubated and sedated at this time. She has had a central line and arterial line placed. Vascular was again consulted for another dialysis line. Hemoglobin dropped from 10.9-7.4 and blood was transfused. Remains in sinus rhythm.     Review of Systems:   Negative except as described above    Problem List:  Patient Active Problem List   Diagnosis    Hyperkalemia    Atrial fibrillation (HCC)    Acute renal insufficiency    Chronic heart failure with preserved ejection fraction ([facility]pEF) ([facility])    Acute and chr resp failure, unsp w hypoxia or hypercapnia ([facility])    Essential hypertension       Current Medications:    Current Facility-Administered Medications:     sodium chloride flush 0.9 % injection 10 mL, 10 mL, Intravenous, 2 times per day, Sohail Ferraro MD    sodium chloride flush 0.9 % injection 10 mL, 10 mL, Intravenous, PRN, Sohail Ferraro MD    acetaminophen (TYLENOL) tablet 650 mg, 650 mg, Oral, Q6H PRN **OR** acetaminophen (TYLENOL) suppository 650 mg, 650 mg, Rectal, Q6H PRN, Sohail Ferraro MD    polyethylene glycol (GLYCOLAX) packet 17 g, 17 g, Oral, Daily PRN, Sohail Ferraro MD    promethazine (PHENERGAN) tablet 12.5 mg, 12.5 mg, Oral, Q6H PRN **OR** ondansetron (ZOFRAN) injection 4 mg, 4 mg, Intravenous, Q6H PRN, Sohail Ferraro MD    [Held by provider] heparin (porcine) injection 5,000 Units, 5,000 (PULMICORT) nebulizer suspension 250 mcg, 250 mcg, Nebulization, BID, Diana SHAW MD, 250 mcg at 03/17/20 0833    [COMPLETED] insulin regular (HUMULIN R;NOVOLIN R) injection 10 Units, 10 Units, Intravenous, Once, 10 Units at 03/14/20 0006 **AND** dextrose 50 % IV solution, 25 g, Intravenous, Once **AND** POCT Glucose, , , Q30 Min **AND** POCT Glucose, , , Q1H **AND** [CANCELED] POCT Glucose, , , 4x Daily AC & HS, Omero Head MD    glucose (GLUTOSE) 40 % oral gel 15 g, 15 g, Oral, PRN, Shabbir Hawley MD    dextrose 50 % IV solution, 12.5 g, Intravenous, PRNShabbir MD    glucagon (rDNA) injection 1 mg, 1 mg, Intramuscular, PRNShabbir MD    dextrose 5 % solution, 100 mL/hr, Intravenous, PRNShabbir MD    sodium polystyrene (KAYEXALATE) 15 GM/60ML suspension 15 g, 15 g, Oral, Once, Shabbir Hawley MD    glucose (GLUTOSE) 40 % oral gel 15 g, 15 g, Oral, PRNShabbir MD    dextrose 50 % IV solution, 12.5 g, Intravenous, PRNShabbir MD    glucagon (rDNA) injection 1 mg, 1 mg, Intramuscular, PRNShabbir MD    dextrose 5 % solution, 100 mL/hr, Intravenous, PRNShabbir MD    perflutren lipid microspheres (DEFINITY) injection 1.65 mg, 1.5 mL, Intravenous, ONCE PRNShabbir MD    Physical Exam:  /65   Pulse 76   Temp 99.1 °F (37.3 °C) (Oral)   Resp 16   Ht 5' 1\" (1.549 m)   Wt 97 lb 7.1 oz (44.2 kg)   SpO2 100%   BMI 18.41 kg/m²   Wt Readings from Last 3 Encounters:   03/16/20 97 lb 7.1 oz (44.2 kg)   09/03/19 89 lb (40.4 kg)   08/01/19 90 lb (40.8 kg)     Appearance: Intubated, sedated  Skin: Intact, no rash  Head: Normocephalic, atraumatic  Eyes: EOMI, no conjunctival erythema  ENMT: No pharyngeal erythema, MMM, no rhinorrhea  Neck: Supple, no elevated JVP, no carotid bruits  Lungs: Diminished, scattered wheeze  Cardiac: PMI nondisplaced, Regular rhythm with a normal rate, S1 & S2 normal,

## 2020-03-17 NOTE — SIGNIFICANT EVENT
Rapid Response Team Note  Date of event: 3/17/2020   Time of event: 2136  Sp Garcia 68y.o. year old female   YOB: 1950   Admit date:  3/13/2020   Location: [facility]/440-A   Witnessed? : [x]Yes  [] No  Monitored? : [x]Yes  [] No  Code status: [] Full  [] DNR-CCA  []DNR-CC  ______________________________________________________________________  Reason for RRT:    [] RR < 8     [] RR > 28   [] SpO2 <90%   [] HR < 40 bpm   [] HR > 130 bpm  [] SBP < 90 mmHg    [] SpO2 <90%   [] LOC   [] Seizures    [] Significant Bleeding Event    [] Other: AMS, blood loss from pulled R femoral vasc cath    Subjective:   CTSP regarding the above event. Patient admitted to MICU last Friday and was transferred out today. Treated for aspiration pneumonia, atrial fibrillation (no oac because of GIB), COPD exacerbation, KAYLA and hyperkalemia on HD, anemia, and thrombocytopenia. Patient found on the floor, confused, and had pulled her R femoral HD cath out. (+) extensive blood loss, not immediately resolved with pressure. Patient saturating in 70s, altered, and not following commands. R hip externally rotated. Multiple bruises on the arms noted. Briefly went into afib RVR 130s. Patient intubated for airway protection (meds given: versed 4mg + fentanyl 100 mg). Propofol gtt started. SBP 90s - 100s, previously SBP was 150s - 190s. Bleeding from previous HD site eventually controlled with pressure, with discoloration noted over the site but no obvious hematoma.        Objective:   Initial Condition:  Conscious   [x] Yes  [] No     Breathing [x] Yes  [] No     Pulse  [x] Yes  [] No    Airway:   [x] Open/ Clear     Intervention: [] None  [] Pooled secretions     [] Suctioned  [] Stridor      [x] Intubation (AMS, desaturating to 70s)    Lungs:   [] Symmetrical chest rise/ CTABL Intervention: [] None  [] Use of accessory muscles    [] NIV (CPAP/BiPAP)  [] Cyanosis      [] Nasal Oxygen/Mask  [x] Wheezing       [] ABG [] CXR  [] Other:     Circulation:   Rhythm:  [x] Sinus [x] Other: briefly into afib RVR  Intervention: [] None            [] IV Access  [] Peripheral              [] Central            [] EKG            [] Cardioversion            [] Defibrillation     Capillary Refill:  [] > 2 seconds [x] < 2 seconds    Neurologic:   [] NIHSS      [] Pupillary Response:     Response to pain:   [] Yes  [] No  Follow commands:  [] Yes  [] No  Facial asymmetry:  [] Yes  [] No  Motor strength:  [] Equal  [] Focal deficit:   Diagnostic Test:  Blood Sugar:  137 mg/dL    ABG:      Lab Results   Component Value Date    PH 7.361 03/16/2020    PCO2 36.5 03/16/2020    PO2 >500.0 03/16/2020    HCO3 20.2 03/16/2020    O2SAT 99.8 03/16/2020     Medication(s) Given:  []  Narcan (Naloxone)   []  Romazicon (Flumazenil)    []  Breathing Treatment    []  Steroids (Prednisone, Solu-Medrol)  []  Adenosine  [] Cardiac Medicines:     Infusion(s):   [] Normal Saline    Amount:       [] Lactate Ringers      [] Other:     Imaging:   [] CXR:   [] Normal         [] Pneumothorax         [] Pulmonary Edema  [] Infiltrate          [] CT Head  [] Normal          [] ICB          [] SAH          [] Other:             Assessment  1. Acute encephalopathy - question of alcohol withdrawal (admitted 4 days ago). 2. Hypotension, likely 2/2 to acute blood loss from pulling out R femoral HD cath. Patient hypotensive at SBP 90s - 100s as her baseline has been  - 190s. Plan  1. Patient intubated for airway protection (altered, hypoxic). Get ammonia level   2. Will give fluids and blood, monitor BP. May need pressors. Change propofol to versed. Obtain LA, BMP, coags. TEG. CBC to monitor hgb and platelet. Will need to monitor renal function because of drop in BP. Will need neurological re-assessment as well, if with changes, get CT head to r/o stroke. 3. Insert central line, arterial line  4.  Will need new HD cath vs tessio insertion ?  ?  ?  Disposition:  [] No transfer   [] Transfer to monitor floor  [x] Transfer to: [x] MICU [] NICU [] CCU [] SICU    Patients family updated: [] Yes  [] No   Discussed with:  [] Critical Care Intensivist: Dr. Agustin Murcia      [] Primary Care Provider: No primary care provider on file. [] Other: ?    Jhonathan Santiago.  Aga Trejo MD PGY-3  3/17/2020 12:15 AM  Attending Kelechi Hickey MD

## 2020-03-17 NOTE — CONSULTS
[facility] Consultation Note    Reason for Consult:  HD catheter placement    HPI :    Paul Casanova is a 68 y.o. female who was transferred from [facility] for evaluation of left lower extremity weakness and bradycardia on 3/14. During her admission she was found to be hyperkalemic and had A. fib with RVR. Vascular surgery was consulted for hemodialysis catheter placement due to her KAYLA and hyperkalemia. During her admission she was transferred out of the medical intensive care unit however she became altered on the floor overnight pulled out her temporary dialysis catheter was again hypotensive, she was intubated and transferred to the medical intensive care unit.        ROS   Intubated sedated unable to obtain at this time      Past Surgical History:   Procedure Laterality Date    UPPER GASTROINTESTINAL ENDOSCOPY N/A 3/15/2020    EGD DIAGNOSTIC ONLY performed by Luigi Denis MD at [address] [place] [address]     Current Medications:    propofol 30 mcg/kg/min (03/17/20 0045)    midazolam 6 mg/hr (03/17/20 0145)    dextrose 5 % and 0.9 % NaCl 40 mL/hr at 03/16/20 1811    dextrose      dextrose        sodium chloride flush, acetaminophen **OR** acetaminophen, polyethylene glycol, promethazine **OR** ondansetron, albuterol sulfate HFA, anticoagulant sodium citrate, glucose, dextrose, glucagon (rDNA), dextrose, glucose, dextrose, glucagon (rDNA), dextrose, perflutren lipid microspheres    sodium chloride flush  10 mL Intravenous 2 times per day    [Held by provider] heparin (porcine)  5,000 Units Subcutaneous 3 times per day    pantoprazole  40 mg Intravenous Daily    [Held by provider] metoprolol succinate  100 mg Oral Daily    folic acid  1 mg Oral Daily    vitamin B-1  100 mg Oral Daily    [Held by provider] hydrALAZINE  50 mg Oral 3 times per day    [Held by provider] amLODIPine  10 mg Oral Daily    fentaNYL        predniSONE  20 mg Oral Daily    ampicillin-sulbactam  3 g Intravenous Q24H    ferrous sulfate  325 mg Oral BID WC    ipratropium-albuterol  1 ampule Inhalation Q4H WA    budesonide  250 mcg Nebulization BID    dextrose  25 g Intravenous Once    sodium polystyrene  15 g Oral Once        Allergies:  Patient has no known allergies. Social History     Socioeconomic History    Marital status: Single     Spouse name: Not on file    Number of children: Not on file    Years of education: Not on file    Highest education level: Not on file   Occupational History    Not on file   Social Needs    Financial resource strain: Not on file    Food insecurity     Worry: Not on file     Inability: Not on file    Transportation needs     Medical: Not on file     Non-medical: Not on file   Tobacco Use    Smoking status: Current Every Day Smoker     Packs/day: 1.00     Types: Cigarettes   Substance and Sexual Activity    Alcohol use: Yes     Frequency: 2-3 times a week     Drinks per session: Patient refused     Binge frequency: Patient refused    Drug use: Not Currently     Types: Marijuana    Sexual activity: Not on file   Lifestyle    Physical activity     Days per week: Not on file     Minutes per session: Not on file    Stress: Not on file   Relationships    Social connections     Talks on phone: Not on file     Gets together: Not on file     Attends Rastafarian service: Not on file     Active member of club or organization: Not on file     Attends meetings of clubs or organizations: Not on file     Relationship status: Not on file    Intimate partner violence     Fear of current or ex partner: Not on file     Emotionally abused: Not on file     Physically abused: Not on file     Forced sexual activity: Not on file   Other Topics Concern    Not on file   Social History Narrative    Not on file        No family history on file.     PHYSICAL EXAM:    BP (!) 137/50   Pulse 71   Temp 97.3 °F (36.3 °C) (Esophageal)   Resp 16   Ht 5' 1\" (1.549 m)   Wt 97 lb 7.1 oz (44.2 kg)   SpO2 100% BMI 18.41 kg/m²      GENERAL: Intubated sedated  HEAD:  Normocephalic. Atraumatic. EYES:   No scleral icterus. PERRL. LUNGS:  AC ventilation  CARDIOVASCULAR: A. fib with RVR  ABDOMEN:  Soft, non-distended, non-tender. No guarding, rigidity, rebound. EXTREMITIES:   No lower extremity edema  SKIN:  Warm and dry    R posterior tibial 1+ L posterior tibial 1+   R dorsalis pedis 2+ L dorsalis pedis 2+     LABS:    Lab Results   Component Value Date    WBC 10.7 03/17/2020    HGB 8.1 (L) 03/17/2020    HCT 25.2 (L) 03/17/2020    PLT 67 (L) 03/17/2020    PROTIME 14.2 (H) 03/16/2020    INR 1.3 03/16/2020    K 4.2 03/17/2020    BUN 31 (H) 03/17/2020    CREATININE 2.9 (H) 03/17/2020       RADIOLOGY:    Assesment/Plan  KAYLA with hyperkalemia requiring hemodialysis     Nephrology believes patient will need continued dialysis. We will plan for placement of tunneled dialysis catheter.   Discussed with Dr. Allyson Soulier

## 2020-03-17 NOTE — ANESTHESIA PRE PROCEDURE
Department of Anesthesiology  Preprocedure Note       Name:  Tereso Gibson   Age:  68 y.o.  :  1950                                          MRN:  [id number]         Date:  3/17/2020      Surgeon: Christa Boast):  Ponce Canela MD    Procedure: CATHETER INSERTION TUNNELED DIALYSIS (N/A )    Medications prior to admission:   Prior to Admission medications    Medication Sig Start Date End Date Taking? Authorizing Provider   famotidine (PEPCID) 20 MG tablet Take 1 tablet by mouth daily 3/16/20  Yes Nae Reyes MD   dilTIAZem (DILACOR XR) 180 MG extended release capsule Take 180 mg by mouth daily 2 CAP PO DAILY   Yes Historical Provider, MD   spironolactone (ALDACTONE) 25 MG tablet Take 25 mg by mouth daily HALF TAB   Yes Historical Provider, MD   triamcinolone (KENALOG) 0.025 % cream Apply topically 2 times daily.  9/3/19  Yes SAMUEL Devlin - CNP   metoprolol succinate (TOPROL XL) 100 MG extended release tablet TAKE ONE TABLET BY MOUTH EVERY DAY 19  Yes Historical Provider, MD   VENTOLIN  (90 Base) MCG/ACT inhaler INHALE 2 PUFFS EVERY 4 HOURS AS NEEDED FOR SHORTNESS OF BREATH 19  Yes Historical Provider, MD   potassium citrate (UROCIT-K) 10 MEQ (1080 MG) extended release tablet Take by mouth 3 times daily (with meals)    Historical Provider, MD   potassium chloride (MICRO-K) 10 MEQ extended release capsule Take 20 mEq by mouth daily    Historical Provider, MD   predniSONE (DELTASONE) 20 MG tablet Take 20 mg by mouth daily    Historical Provider, MD       Current medications:    Current Facility-Administered Medications   Medication Dose Route Frequency Provider Last Rate Last Dose    sodium chloride flush 0.9 % injection 10 mL  10 mL Intravenous 2 times per day Lv Cortes MD   10 mL at 20 1038    sodium chloride flush 0.9 % injection 10 mL  10 mL Intravenous PRN Lv Cortes MD        acetaminophen (TYLENOL) tablet 650 mg  650 mg Oral Q6H PRN Lv Cortes MD        Or   Rakan Fajardo (DELTASONE) tablet 20 mg  20 mg Oral Daily America Childress MD   20 mg at 03/17/20 1034    midazolam (VERSED) 100 mg in dextrose 5 % 100 mL infusion  1 mg/hr Intravenous Continuous Treasure Sierra MD 3 mL/hr at 03/17/20 1159 3 mg/hr at 03/17/20 1159    dextrose 5 % and 0.9 % sodium chloride infusion   Intravenous Continuous Yvette Sams MD 40 mL/hr at 03/16/20 1811      anticoagulant sodium citrate 4 GM/100ML solution 0.104 g  2.6 mL Intracatheter PRN Israel SHAW MD   0.104 g at 03/15/20 1224    ampicillin-sulbactam (UNASYN) 3 g ivpb minibag  3 g Intravenous Q24H Yvette Sams MD   Stopped at 03/17/20 0631    ferrous sulfate (IRON 325) tablet 325 mg  325 mg Oral BID WC Israel SHAW MD   325 mg at 03/16/20 1655    ipratropium-albuterol (Leellen Grad) nebulizer solution 1 ampule  1 ampule Inhalation Q4H WA Israel SHAW MD   1 ampule at 03/17/20 1600    budesonide (PULMICORT) nebulizer suspension 250 mcg  250 mcg Nebulization BID Israel SHAW MD   250 mcg at 03/17/20 0833    dextrose 50 % IV solution  25 g Intravenous Once Yvette Sams MD        glucose (GLUTOSE) 40 % oral gel 15 g  15 g Oral PRN Yvette Sams MD        dextrose 50 % IV solution  12.5 g Intravenous PRN Yvette Sams MD        glucagon (rDNA) injection 1 mg  1 mg Intramuscular PRABIGAIL Sams MD        dextrose 5 % solution  100 mL/hr Intravenous PRN Yvette Sams MD        sodium polystyrene (KAYEXALATE) 15 GM/60ML suspension 15 g  15 g Oral Once Yvette Sams MD        glucose (GLUTOSE) 40 % oral gel 15 g  15 g Oral PRN Yvette Sams MD        dextrose 50 % IV solution  12.5 g Intravenous PRN Yvette Sams MD        glucagon (rDNA) injection 1 mg  1 mg Intramuscular ODALYSN Yvette Sams MD        dextrose 5 % solution  100 mL/hr Intravenous PRN Yvette Sams MD        perflutren lipid microspheres (DEFINITY) injection 1.65 mg  1.5 mL Intravenous 03/17/2020    K 4.2 03/17/2020    K 4.8 03/15/2020    CL 98 03/17/2020    CO2 21 03/17/2020    BUN 31 03/17/2020    CREATININE 2.9 03/17/2020    GFRAA 19 03/17/2020    LABGLOM 16 03/17/2020    GLUCOSE 104 03/17/2020    PROT 3.9 03/17/2020    CALCIUM 7.3 03/17/2020    BILITOT 0.5 03/17/2020    ALKPHOS 66 03/17/2020     03/17/2020     03/17/2020       POC Tests: No results for input(s): POCGLU, POCNA, POCK, POCCL, POCBUN, POCHEMO, POCHCT in the last 72 hours. Coags:   Lab Results   Component Value Date    PROTIME 14.2 03/16/2020    INR 1.3 03/16/2020    APTT 25.9 03/16/2020       HCG (If Applicable): No results found for: PREGTESTUR, PREGSERUM, HCG, HCGQUANT     ABGs: No results found for: PHART, PO2ART, RRO7DNC, ELT1IJL, BEART, M3JOOWLF     Type & Screen (If Applicable):  No results found for: LABABO, LABRH     12 Lead EKG 3/14/20  Narrative & Impression     Atrial fibrillation with rapid ventricular response  Right bundle branch block  T wave abnormality, consider inferior ischemia  Abnormal ECG  When compared with ECG of 13-MAR-2020 23:59,  Significant changes have occurred     Echo 3/17/20   Findings      Left Ventricle   Normal left ventricular size and systolic function. Ejection fraction is visually estimated at 55-60%. Grade II diastolic dysfunction. No regional wall motion abnormalities seen. Mild left ventricular concentric hypertrophy noted. Abnormal LV septal motion consistent with conduction disorder. Right Ventricle   Normal right ventricular size and function. Left Atrium   The left atrium is mildly dilated. KAYCEE 35 ml/m2. Right Atrium   Normal right atrial size. Mitral Valve   Structurally normal mitral valve. No evidence of mitral valve stenosis. Physiologic mitral regurgitation. Tricuspid Valve   The tricuspid valve appears structurally normal.   Physiologic and/or trace tricuspid regurgitation.       Aortic Valve   Structurally normal aortic

## 2020-03-18 ENCOUNTER — APPOINTMENT (OUTPATIENT)
Dept: GENERAL RADIOLOGY | Age: 70
DRG: 673 | End: 2020-03-18
Attending: INTERNAL MEDICINE
Payer: COMMERCIAL

## 2020-03-18 ENCOUNTER — ANESTHESIA (OUTPATIENT)
Dept: OPERATING ROOM | Age: 70
DRG: 673 | End: 2020-03-18
Payer: COMMERCIAL

## 2020-03-18 VITALS — TEMPERATURE: 97.9 F | OXYGEN SATURATION: 96 % | RESPIRATION RATE: 14 BRPM

## 2020-03-18 LAB
AADO2: 85.7 MMHG
ALBUMIN SERPL-MCNC: 2.4 G/DL (ref 3.5–5.2)
ALP BLD-CCNC: 58 U/L (ref 35–104)
ALT SERPL-CCNC: 206 U/L (ref 0–32)
ANION GAP SERPL CALCULATED.3IONS-SCNC: 15 MMOL/L (ref 7–16)
ANION GAP SERPL CALCULATED.3IONS-SCNC: 19 MMOL/L (ref 7–16)
ANISOCYTOSIS: ABNORMAL
AST SERPL-CCNC: 48 U/L (ref 0–31)
B.E.: -2 MMOL/L (ref -3–3)
BASOPHILS ABSOLUTE: 0.01 E9/L (ref 0–0.2)
BASOPHILS RELATIVE PERCENT: 0.1 % (ref 0–2)
BILIRUB SERPL-MCNC: 0.4 MG/DL (ref 0–1.2)
BUN BLDV-MCNC: 41 MG/DL (ref 8–23)
BUN BLDV-MCNC: 8 MG/DL (ref 8–23)
CALCIUM SERPL-MCNC: 7.6 MG/DL (ref 8.6–10.2)
CALCIUM SERPL-MCNC: 8.5 MG/DL (ref 8.6–10.2)
CHLORIDE BLD-SCNC: 97 MMOL/L (ref 98–107)
CHLORIDE BLD-SCNC: 98 MMOL/L (ref 98–107)
CO2: 20 MMOL/L (ref 22–29)
CO2: 26 MMOL/L (ref 22–29)
COHB: 0.5 % (ref 0–1.5)
CREAT SERPL-MCNC: 1.2 MG/DL (ref 0.5–1)
CREAT SERPL-MCNC: 4.1 MG/DL (ref 0.5–1)
CRITICAL: ABNORMAL
CYTOMEGALOVIRUS IGG ANTIBODY: NORMAL
CYTOMEGALOVIRUS IGM ANTIBODY: NORMAL
DATE ANALYZED: ABNORMAL
DATE OF COLLECTION: ABNORMAL
EOSINOPHILS ABSOLUTE: 0 E9/L (ref 0.05–0.5)
EOSINOPHILS RELATIVE PERCENT: 0 % (ref 0–6)
FIO2: 40 %
GFR AFRICAN AMERICAN: 13
GFR AFRICAN AMERICAN: 53
GFR NON-AFRICAN AMERICAN: 11 ML/MIN/1.73
GFR NON-AFRICAN AMERICAN: 44 ML/MIN/1.73
GLUCOSE BLD-MCNC: 93 MG/DL (ref 74–99)
GLUCOSE BLD-MCNC: 93 MG/DL (ref 74–99)
HCO3: 21.9 MMOL/L (ref 22–26)
HCT VFR BLD CALC: 19.3 % (ref 34–48)
HCT VFR BLD CALC: 20.1 % (ref 34–48)
HCT VFR BLD CALC: 25.1 % (ref 34–48)
HCT VFR BLD CALC: 26.3 % (ref 34–48)
HEMOGLOBIN: 6.1 G/DL (ref 11.5–15.5)
HEMOGLOBIN: 6.3 G/DL (ref 11.5–15.5)
HEMOGLOBIN: 8 G/DL (ref 11.5–15.5)
HEMOGLOBIN: 8.2 G/DL (ref 11.5–15.5)
HHB: 1.3 % (ref 0–5)
IMMATURE GRANULOCYTES #: 0.13 E9/L
IMMATURE GRANULOCYTES %: 1.2 % (ref 0–5)
LAB: ABNORMAL
LIPASE: 52 U/L (ref 13–60)
LYMPHOCYTES ABSOLUTE: 0.61 E9/L (ref 1.5–4)
LYMPHOCYTES RELATIVE PERCENT: 5.7 % (ref 20–42)
Lab: ABNORMAL
MAGNESIUM: 1.9 MG/DL (ref 1.6–2.6)
MAGNESIUM: 2 MG/DL (ref 1.6–2.6)
MCH RBC QN AUTO: 29.1 PG (ref 26–35)
MCH RBC QN AUTO: 29.3 PG (ref 26–35)
MCHC RBC AUTO-ENTMCNC: 31.6 % (ref 32–34.5)
MCHC RBC AUTO-ENTMCNC: 31.9 % (ref 32–34.5)
MCV RBC AUTO: 91.3 FL (ref 80–99.9)
MCV RBC AUTO: 92.8 FL (ref 80–99.9)
METHB: 0.8 % (ref 0–1.5)
MODE: AC
MONOCYTES ABSOLUTE: 1.68 E9/L (ref 0.1–0.95)
MONOCYTES RELATIVE PERCENT: 15.8 % (ref 2–12)
NEUTROPHILS ABSOLUTE: 8.18 E9/L (ref 1.8–7.3)
NEUTROPHILS RELATIVE PERCENT: 77.2 % (ref 43–80)
O2 CONTENT: 12.7 ML/DL
O2 SATURATION: 98.7 % (ref 92–98.5)
O2HB: 97.4 % (ref 94–97)
OPERATOR ID: ABNORMAL
OVALOCYTES: ABNORMAL
PATIENT TEMP: 37 C
PCO2: 33.8 MMHG (ref 35–45)
PDW BLD-RTO: 16 FL (ref 11.5–15)
PDW BLD-RTO: 16.5 FL (ref 11.5–15)
PEEP/CPAP: 5 CMH2O
PFO2: 3.77 MMHG/%
PH BLOOD GAS: 7.43 (ref 7.35–7.45)
PHOSPHORUS: 2.1 MG/DL (ref 2.5–4.5)
PHOSPHORUS: 5.7 MG/DL (ref 2.5–4.5)
PLATELET # BLD: 69 E9/L (ref 130–450)
PLATELET # BLD: 85 E9/L (ref 130–450)
PLATELET CONFIRMATION: NORMAL
PMV BLD AUTO: 11.7 FL (ref 7–12)
PMV BLD AUTO: 12.1 FL (ref 7–12)
PO2: 150.6 MMHG (ref 75–100)
POIKILOCYTES: ABNORMAL
POLYCHROMASIA: ABNORMAL
POTASSIUM REFLEX MAGNESIUM: 4.4 MMOL/L (ref 3.5–5)
POTASSIUM SERPL-SCNC: 3.8 MMOL/L (ref 3.5–5)
POTASSIUM SERPL-SCNC: 4.4 MMOL/L (ref 3.5–5)
RBC # BLD: 2.08 E12/L (ref 3.5–5.5)
RBC # BLD: 2.75 E12/L (ref 3.5–5.5)
RI(T): 0.57
RR MECHANICAL: 14 B/MIN
SCHISTOCYTES: ABNORMAL
SODIUM BLD-SCNC: 136 MMOL/L (ref 132–146)
SODIUM BLD-SCNC: 139 MMOL/L (ref 132–146)
SOURCE, BLOOD GAS: ABNORMAL
THB: 9 G/DL (ref 11.5–16.5)
TIME ANALYZED: 504
TOTAL PROTEIN: 4.4 G/DL (ref 6.4–8.3)
VT MECHANICAL: 450 ML
WBC # BLD: 10.4 E9/L (ref 4.5–11.5)
WBC # BLD: 10.6 E9/L (ref 4.5–11.5)

## 2020-03-18 PROCEDURE — P9047 ALBUMIN (HUMAN), 25%, 50ML: HCPCS

## 2020-03-18 PROCEDURE — 36430 TRANSFUSION BLD/BLD COMPNT: CPT

## 2020-03-18 PROCEDURE — P9047 ALBUMIN (HUMAN), 25%, 50ML: HCPCS | Performed by: INTERNAL MEDICINE

## 2020-03-18 PROCEDURE — 82805 BLOOD GASES W/O2 SATURATION: CPT

## 2020-03-18 PROCEDURE — 2500000003 HC RX 250 WO HCPCS: Performed by: SURGERY

## 2020-03-18 PROCEDURE — 94640 AIRWAY INHALATION TREATMENT: CPT

## 2020-03-18 PROCEDURE — 6370000000 HC RX 637 (ALT 250 FOR IP): Performed by: SURGERY

## 2020-03-18 PROCEDURE — C1881 DIALYSIS ACCESS SYSTEM: HCPCS | Performed by: SURGERY

## 2020-03-18 PROCEDURE — 99233 SBSQ HOSP IP/OBS HIGH 50: CPT | Performed by: INTERNAL MEDICINE

## 2020-03-18 PROCEDURE — 2580000003 HC RX 258: Performed by: SURGERY

## 2020-03-18 PROCEDURE — 2500000003 HC RX 250 WO HCPCS: Performed by: STUDENT IN AN ORGANIZED HEALTH CARE EDUCATION/TRAINING PROGRAM

## 2020-03-18 PROCEDURE — 83735 ASSAY OF MAGNESIUM: CPT

## 2020-03-18 PROCEDURE — 6370000000 HC RX 637 (ALT 250 FOR IP): Performed by: INTERNAL MEDICINE

## 2020-03-18 PROCEDURE — 94003 VENT MGMT INPAT SUBQ DAY: CPT

## 2020-03-18 PROCEDURE — 83690 ASSAY OF LIPASE: CPT

## 2020-03-18 PROCEDURE — 3600000003 HC SURGERY LEVEL 3 BASE: Performed by: SURGERY

## 2020-03-18 PROCEDURE — 80048 BASIC METABOLIC PNL TOTAL CA: CPT

## 2020-03-18 PROCEDURE — 02HV33Z INSERTION OF INFUSION DEVICE INTO SUPERIOR VENA CAVA, PERCUTANEOUS APPROACH: ICD-10-PCS | Performed by: SURGERY

## 2020-03-18 PROCEDURE — 5A1D70Z PERFORMANCE OF URINARY FILTRATION, INTERMITTENT, LESS THAN 6 HOURS PER DAY: ICD-10-PCS | Performed by: INTERNAL MEDICINE

## 2020-03-18 PROCEDURE — 6360000002 HC RX W HCPCS: Performed by: INTERNAL MEDICINE

## 2020-03-18 PROCEDURE — 2000000000 HC ICU R&B

## 2020-03-18 PROCEDURE — 0JH63XZ INSERTION OF TUNNELED VASCULAR ACCESS DEVICE INTO CHEST SUBCUTANEOUS TISSUE AND FASCIA, PERCUTANEOUS APPROACH: ICD-10-PCS | Performed by: SURGERY

## 2020-03-18 PROCEDURE — 80053 COMPREHEN METABOLIC PANEL: CPT

## 2020-03-18 PROCEDURE — 6360000002 HC RX W HCPCS

## 2020-03-18 PROCEDURE — 90935 HEMODIALYSIS ONE EVALUATION: CPT

## 2020-03-18 PROCEDURE — 93005 ELECTROCARDIOGRAM TRACING: CPT | Performed by: INTERNAL MEDICINE

## 2020-03-18 PROCEDURE — 6360000002 HC RX W HCPCS: Performed by: STUDENT IN AN ORGANIZED HEALTH CARE EDUCATION/TRAINING PROGRAM

## 2020-03-18 PROCEDURE — 37799 UNLISTED PX VASCULAR SURGERY: CPT

## 2020-03-18 PROCEDURE — 71045 X-RAY EXAM CHEST 1 VIEW: CPT

## 2020-03-18 PROCEDURE — 3209999900 FLUORO FOR SURGICAL PROCEDURES

## 2020-03-18 PROCEDURE — C9113 INJ PANTOPRAZOLE SODIUM, VIA: HCPCS | Performed by: STUDENT IN AN ORGANIZED HEALTH CARE EDUCATION/TRAINING PROGRAM

## 2020-03-18 PROCEDURE — 85025 COMPLETE CBC W/AUTO DIFF WBC: CPT

## 2020-03-18 PROCEDURE — 3700000000 HC ANESTHESIA ATTENDED CARE: Performed by: SURGERY

## 2020-03-18 PROCEDURE — 84100 ASSAY OF PHOSPHORUS: CPT

## 2020-03-18 PROCEDURE — 36415 COLL VENOUS BLD VENIPUNCTURE: CPT

## 2020-03-18 PROCEDURE — 6360000002 HC RX W HCPCS: Performed by: SURGERY

## 2020-03-18 PROCEDURE — 3600000013 HC SURGERY LEVEL 3 ADDTL 15MIN: Performed by: SURGERY

## 2020-03-18 PROCEDURE — 2580000003 HC RX 258: Performed by: INTERNAL MEDICINE

## 2020-03-18 PROCEDURE — 2500000003 HC RX 250 WO HCPCS: Performed by: INTERNAL MEDICINE

## 2020-03-18 PROCEDURE — P9016 RBC LEUKOCYTES REDUCED: HCPCS

## 2020-03-18 PROCEDURE — 2580000003 HC RX 258: Performed by: NURSE ANESTHETIST, CERTIFIED REGISTERED

## 2020-03-18 PROCEDURE — 6370000000 HC RX 637 (ALT 250 FOR IP): Performed by: STUDENT IN AN ORGANIZED HEALTH CARE EDUCATION/TRAINING PROGRAM

## 2020-03-18 PROCEDURE — 2709999900 HC NON-CHARGEABLE SUPPLY: Performed by: SURGERY

## 2020-03-18 PROCEDURE — 77001 FLUOROGUIDE FOR VEIN DEVICE: CPT | Performed by: SURGERY

## 2020-03-18 PROCEDURE — 85014 HEMATOCRIT: CPT

## 2020-03-18 PROCEDURE — 36558 INSERT TUNNELED CV CATH: CPT | Performed by: SURGERY

## 2020-03-18 PROCEDURE — 85018 HEMOGLOBIN: CPT

## 2020-03-18 PROCEDURE — 3700000001 HC ADD 15 MINUTES (ANESTHESIA): Performed by: SURGERY

## 2020-03-18 PROCEDURE — 85027 COMPLETE CBC AUTOMATED: CPT

## 2020-03-18 RX ORDER — ALBUMIN (HUMAN) 12.5 G/50ML
SOLUTION INTRAVENOUS
Status: COMPLETED
Start: 2020-03-18 | End: 2020-03-18

## 2020-03-18 RX ORDER — MORPHINE SULFATE 2 MG/ML
1 INJECTION, SOLUTION INTRAMUSCULAR; INTRAVENOUS EVERY 5 MIN PRN
Status: DISCONTINUED | OUTPATIENT
Start: 2020-03-18 | End: 2020-03-18 | Stop reason: HOSPADM

## 2020-03-18 RX ORDER — HYDROCODONE BITARTRATE AND ACETAMINOPHEN 5; 325 MG/1; MG/1
1 TABLET ORAL PRN
Status: DISCONTINUED | OUTPATIENT
Start: 2020-03-18 | End: 2020-03-18 | Stop reason: HOSPADM

## 2020-03-18 RX ORDER — MEPERIDINE HYDROCHLORIDE 50 MG/ML
12.5 INJECTION INTRAMUSCULAR; INTRAVENOUS; SUBCUTANEOUS EVERY 5 MIN PRN
Status: DISCONTINUED | OUTPATIENT
Start: 2020-03-18 | End: 2020-03-18 | Stop reason: HOSPADM

## 2020-03-18 RX ORDER — PROMETHAZINE HYDROCHLORIDE 25 MG/ML
6.25 INJECTION, SOLUTION INTRAMUSCULAR; INTRAVENOUS EVERY 10 MIN PRN
Status: DISCONTINUED | OUTPATIENT
Start: 2020-03-18 | End: 2020-03-18 | Stop reason: HOSPADM

## 2020-03-18 RX ORDER — MORPHINE SULFATE 2 MG/ML
2 INJECTION, SOLUTION INTRAMUSCULAR; INTRAVENOUS EVERY 5 MIN PRN
Status: DISCONTINUED | OUTPATIENT
Start: 2020-03-18 | End: 2020-03-18 | Stop reason: HOSPADM

## 2020-03-18 RX ORDER — ALBUMIN (HUMAN) 12.5 G/50ML
25 SOLUTION INTRAVENOUS ONCE
Status: COMPLETED | OUTPATIENT
Start: 2020-03-18 | End: 2020-03-18

## 2020-03-18 RX ORDER — LIDOCAINE HYDROCHLORIDE 10 MG/ML
INJECTION, SOLUTION INFILTRATION; PERINEURAL PRN
Status: DISCONTINUED | OUTPATIENT
Start: 2020-03-18 | End: 2020-03-18 | Stop reason: ALTCHOICE

## 2020-03-18 RX ORDER — 0.9 % SODIUM CHLORIDE 0.9 %
250 INTRAVENOUS SOLUTION INTRAVENOUS ONCE
Status: COMPLETED | OUTPATIENT
Start: 2020-03-19 | End: 2020-03-19

## 2020-03-18 RX ORDER — HYDROCODONE BITARTRATE AND ACETAMINOPHEN 5; 325 MG/1; MG/1
2 TABLET ORAL PRN
Status: DISCONTINUED | OUTPATIENT
Start: 2020-03-18 | End: 2020-03-18 | Stop reason: HOSPADM

## 2020-03-18 RX ORDER — BUPIVACAINE HYDROCHLORIDE 2.5 MG/ML
INJECTION, SOLUTION EPIDURAL; INFILTRATION; INTRACAUDAL PRN
Status: DISCONTINUED | OUTPATIENT
Start: 2020-03-18 | End: 2020-03-18 | Stop reason: ALTCHOICE

## 2020-03-18 RX ORDER — SODIUM CHLORIDE 9 MG/ML
INJECTION, SOLUTION INTRAVENOUS CONTINUOUS PRN
Status: DISCONTINUED | OUTPATIENT
Start: 2020-03-18 | End: 2020-03-18 | Stop reason: SDUPTHER

## 2020-03-18 RX ADMIN — FOLIC ACID 1 MG: 5 INJECTION, SOLUTION INTRAMUSCULAR; INTRAVENOUS; SUBCUTANEOUS at 09:30

## 2020-03-18 RX ADMIN — THIAMINE HYDROCHLORIDE 100 MG: 100 INJECTION, SOLUTION INTRAMUSCULAR; INTRAVENOUS at 09:15

## 2020-03-18 RX ADMIN — SODIUM CHLORIDE: 9 INJECTION, SOLUTION INTRAVENOUS at 13:06

## 2020-03-18 RX ADMIN — PANTOPRAZOLE SODIUM 40 MG: 40 INJECTION, POWDER, FOR SOLUTION INTRAVENOUS at 09:15

## 2020-03-18 RX ADMIN — CHLORHEXIDINE GLUCONATE 0.12% ORAL RINSE 15 ML: 1.2 LIQUID ORAL at 21:23

## 2020-03-18 RX ADMIN — AMPICILLIN SODIUM AND SULBACTAM SODIUM 3 G: 2; 1 INJECTION, POWDER, FOR SOLUTION INTRAMUSCULAR; INTRAVENOUS at 02:23

## 2020-03-18 RX ADMIN — IPRATROPIUM BROMIDE AND ALBUTEROL SULFATE 1 AMPULE: 2.5; .5 SOLUTION RESPIRATORY (INHALATION) at 08:10

## 2020-03-18 RX ADMIN — EPOETIN ALFA-EPBX 3000 UNITS: 3000 INJECTION, SOLUTION INTRAVENOUS; SUBCUTANEOUS at 10:30

## 2020-03-18 RX ADMIN — ALBUMIN (HUMAN) 25 G: 12.5 SOLUTION INTRAVENOUS at 19:48

## 2020-03-18 RX ADMIN — PREDNISONE 20 MG: 20 TABLET ORAL at 09:15

## 2020-03-18 RX ADMIN — IPRATROPIUM BROMIDE AND ALBUTEROL SULFATE 1 AMPULE: 2.5; .5 SOLUTION RESPIRATORY (INHALATION) at 12:08

## 2020-03-18 RX ADMIN — IPRATROPIUM BROMIDE AND ALBUTEROL SULFATE 1 AMPULE: 2.5; .5 SOLUTION RESPIRATORY (INHALATION) at 22:30

## 2020-03-18 RX ADMIN — BUDESONIDE 250 MCG: 0.25 SUSPENSION RESPIRATORY (INHALATION) at 08:10

## 2020-03-18 RX ADMIN — BUDESONIDE 250 MCG: 0.25 SUSPENSION RESPIRATORY (INHALATION) at 22:31

## 2020-03-18 RX ADMIN — ANTICOAGULANT SODIUM CITRATE SOLUTION 0.1 G: 4 SOLUTION INTRAVENOUS at 20:29

## 2020-03-18 RX ADMIN — Medication 10 ML: at 09:15

## 2020-03-18 RX ADMIN — IPRATROPIUM BROMIDE AND ALBUTEROL SULFATE 1 AMPULE: 2.5; .5 SOLUTION RESPIRATORY (INHALATION) at 16:58

## 2020-03-18 RX ADMIN — Medication 10 ML: at 21:23

## 2020-03-18 RX ADMIN — CHLORHEXIDINE GLUCONATE 0.12% ORAL RINSE 15 ML: 1.2 LIQUID ORAL at 09:15

## 2020-03-18 RX ADMIN — ALBUMIN (HUMAN) 25 G: 0.25 INJECTION, SOLUTION INTRAVENOUS at 18:10

## 2020-03-18 RX ADMIN — ALBUMIN (HUMAN) 25 G: 0.25 INJECTION, SOLUTION INTRAVENOUS at 19:48

## 2020-03-18 RX ADMIN — Medication 50 MCG/HR: at 21:07

## 2020-03-18 RX ADMIN — DEXTROSE AND SODIUM CHLORIDE: 5; 900 INJECTION, SOLUTION INTRAVENOUS at 15:05

## 2020-03-18 RX ADMIN — SODIUM CHLORIDE 250 ML: 9 INJECTION, SOLUTION INTRAVENOUS at 23:58

## 2020-03-18 ASSESSMENT — PULMONARY FUNCTION TESTS
PIF_VALUE: 37
PIF_VALUE: 34
PIF_VALUE: 31
PIF_VALUE: 47
PIF_VALUE: 45
PIF_VALUE: 39
PIF_VALUE: 31
PIF_VALUE: 7
PIF_VALUE: 29
PIF_VALUE: 40
PIF_VALUE: 29
PIF_VALUE: 40
PIF_VALUE: 44
PIF_VALUE: 28
PIF_VALUE: 44
PIF_VALUE: 43
PIF_VALUE: 11
PIF_VALUE: 33
PIF_VALUE: 33
PIF_VALUE: 40
PIF_VALUE: 21
PIF_VALUE: 41
PIF_VALUE: 26
PIF_VALUE: 29
PIF_VALUE: 7
PIF_VALUE: 23
PIF_VALUE: 40
PIF_VALUE: 7
PIF_VALUE: 38
PIF_VALUE: 42
PIF_VALUE: 38
PIF_VALUE: 39
PIF_VALUE: 7
PIF_VALUE: 45
PIF_VALUE: 41
PIF_VALUE: 24
PIF_VALUE: 11
PIF_VALUE: 2
PIF_VALUE: 7
PIF_VALUE: 25
PIF_VALUE: 5

## 2020-03-18 ASSESSMENT — PAIN SCALES - GENERAL
PAINLEVEL_OUTOF10: 0

## 2020-03-18 NOTE — ANESTHESIA PRE PROCEDURE
acetaminophen (TYLENOL) suppository 650 mg  650 mg Rectal Q6H PRN Xander Schwartz MD        polyethylene glycol (GLYCOLAX) packet 17 g  17 g Oral Daily PRN Xander Schwartz MD        promethazine (PHENERGAN) tablet 12.5 mg  12.5 mg Oral Q6H PRN Xander Schwartz MD        Or    [facility] injection 4 mg  4 mg Intravenous Q6H PRN Xander Schwartz MD        [Held by provider] heparin (porcine) injection 5,000 Units  5,000 Units Subcutaneous 3 times per day Xander Schwartz MD        propofol injection  10 mcg/kg/min Intravenous Titrated Yaneli Valles MD   Stopped at 03/17/20 1159    pantoprazole (PROTONIX) injection 40 mg  40 mg Intravenous Daily Yaneli Valles MD   40 mg at 03/18/20 0915    chlorhexidine (PERIDEX) 0.12 % solution 15 mL  15 mL Mouth/Throat BID Kyle Damon MD   15 mL at 64/42/42 0754    folic acid injection 1 mg  1 mg Intravenous Daily Kyle Damon MD   1 mg at 03/18/20 0930    thiamine (B-1) injection 100 mg  100 mg Intravenous Daily Kyle Damon MD   100 mg at 03/18/20 0915    fentaNYL 5 mcg/ml in 0.9%  ml infusion  25 mcg/hr Intravenous Continuous Yaneli Valles MD 10 mL/hr at 03/18/20 1330 50 mcg/hr at 03/18/20 1330    midazolam (VERSED) injection 1 mg  1 mg Intravenous Q4H PRN Yaneli Valles MD        epoetin pavan-epbx (RETACRIT) injection 3,000 Units  3,000 Units Subcutaneous Once per day on Mon Wed Fri Buddy Zambrano MD   3,000 Units at 03/18/20 1030    [Held by provider] metoprolol succinate (TOPROL XL) extended release tablet 100 mg  100 mg Oral Daily Xander Schwartz MD        albuterol sulfate  (90 Base) MCG/ACT inhaler 2 puff  2 puff Inhalation Q4H PRN Xander Schwartz MD        [Held by provider] hydrALAZINE (APRESOLINE) tablet 50 mg  50 mg Oral 3 times per day Jyoti Bauer MD   Stopped at 03/16/20 2014    [Held by provider] amLODIPine (NORVASC) tablet 10 mg  10 mg Oral Daily Christian Underwood MD   10 mg at 03/16/20 5743    predniSONE MD COLIN           Allergies:  No Known Allergies    Problem List:    Patient Active Problem List   Diagnosis Code    Hyperkalemia E87.5    Atrial fibrillation (HCC) I48.91    Acute renal insufficiency N28.9    Chronic heart failure with preserved ejection fraction (HFpEF) ([facility]) I50.32    Acute and chr resp failure, unsp w hypoxia or hypercapnia ([facility] [address] J96.20    Essential hypertension I10       Past Medical History:  History reviewed. No pertinent past medical history. Past Surgical History:        Procedure Laterality Date    UPPER GASTROINTESTINAL ENDOSCOPY N/A 3/15/2020    EGD DIAGNOSTIC ONLY performed by Yvette Sams MD at [facility] History:    Social History     Tobacco Use    Smoking status: Current Every Day Smoker     Packs/day: 1.00     Types: Cigarettes   Substance Use Topics    Alcohol use: Yes     Frequency: 2-3 times a week     Drinks per session: Patient refused     Binge frequency: Patient refused                                Ready to quit: No  Counseling given: No      Vital Signs (Current):   Vitals:    03/18/20 1645 03/18/20 1654 03/18/20 1656 03/18/20 1700   BP:       Pulse: 96 102 96 107   Resp:  13 14    Temp:       TempSrc:       SpO2:  100% 100%    Weight:       Height:                                                  BP Readings from Last 3 Encounters:   03/18/20 (!) 128/53   03/15/20 131/64   09/03/19 138/88       NPO Status: Time of last liquid consumption: 0000                        Time of last solid consumption: 0000                        Date of last liquid consumption: 03/15/20                        Date of last solid food consumption: 03/15/20    BMI:   Wt Readings from Last 3 Encounters:   03/18/20 97 lb 7.1 oz (44.2 kg)   09/03/19 89 lb (40.4 kg)   08/01/19 90 lb (40.8 kg)     Body mass index is 18.41 kg/m².     CBC:   Lab Results   Component Value Date    WBC 10.6 03/18/2020    RBC 2.75 03/18/2020    HGB 8.2 03/18/2020    HCT 26.3 03/18/2020

## 2020-03-18 NOTE — PROGRESS NOTES
Department of Internal Medicine  Nephrology Attending Consult Note    Events reviewed.   Has a new right IJ tunneled dialysis catheter placed today    She remains oliguric    SUBJECTIVE: We are following Mrs. Puneet Villegas for KAYLA needing dialysis       PHYSICAL EXAM:      Vitals:    VITALS:  BP (!) 132/53   Pulse 89   Temp 98.8 °F (37.1 °C) (Esophageal)   Resp 14   Ht 5' 1\" (1.549 m)   Wt 97 lb 7.1 oz (44.2 kg)   SpO2 98%   BMI 18.41 kg/m²   24HR INTAKE/OUTPUT:      Intake/Output Summary (Last 24 hours) at 3/18/2020 1119  Last data filed at 3/18/2020 1100  Gross per 24 hour   Intake 204.92 ml   Output 68 ml   Net 136.92 ml     Access: Right IJ tunneled dialysis catheter  Constitutional: Patient is intubated and sedated  HEENT: Pupils are equal reactive, mucous membranes moist no  Respiratory: Lungs are clear  Cardiovascular/Edema: S1 and S2 present  Gastrointestinal: Abdomen soft, pierre with concentrated urine  Neurologic: Nonfocal  Skin: No lesions  Other: No edema    Scheduled Meds:   sodium chloride flush  10 mL Intravenous 2 times per day    [Held by provider] heparin (porcine)  5,000 Units Subcutaneous 3 times per day    pantoprazole  40 mg Intravenous Daily    chlorhexidine  15 mL Mouth/Throat BID    folic acid  1 mg Intravenous Daily    thiamine  100 mg Intravenous Daily    epoetin pavan-epbx  3,000 Units Subcutaneous Once per day on Mon Wed Fri    [Held by provider] metoprolol succinate  100 mg Oral Daily    [Held by provider] hydrALAZINE  50 mg Oral 3 times per day    [Held by provider] amLODIPine  10 mg Oral Daily    predniSONE  20 mg Oral Daily    ampicillin-sulbactam  3 g Intravenous Q24H    ferrous sulfate  325 mg Oral BID WC    ipratropium-albuterol  1 ampule Inhalation Q4H WA    budesonide  250 mcg Nebulization BID    dextrose  25 g Intravenous Once    sodium polystyrene  15 g Oral Once     Continuous Infusions:   propofol Stopped (03/17/20 1159)    fentaNYL 5 mcg/ml in 0.9%  ml infusion 50 mcg/hr (03/18/20 0226)    midazolam Stopped (03/17/20 2000)    dextrose 5 % and 0.9 % NaCl 40 mL/hr at 03/16/20 1811    dextrose      dextrose       PRN Meds:.sodium chloride flush, acetaminophen **OR** acetaminophen, polyethylene glycol, promethazine **OR** ondansetron, midazolam, albuterol sulfate HFA, anticoagulant sodium citrate, glucose, dextrose, glucagon (rDNA), dextrose, glucose, dextrose, glucagon (rDNA), dextrose, perflutren lipid microspheres    DATA:    CBC:   Lab Results   Component Value Date    WBC 10.6 03/18/2020    RBC 2.75 03/18/2020    HGB 8.0 03/18/2020    HCT 25.1 03/18/2020    MCV 91.3 03/18/2020    MCH 29.1 03/18/2020    MCHC 31.9 03/18/2020    RDW 16.5 03/18/2020    PLT 85 03/18/2020    MPV 12.1 03/18/2020     CMP:    Lab Results   Component Value Date     03/18/2020    K 4.4 03/18/2020    K 4.4 03/18/2020    CL 97 03/18/2020    CO2 20 03/18/2020    BUN 41 03/18/2020    CREATININE 4.1 03/18/2020    GFRAA 13 03/18/2020    LABGLOM 11 03/18/2020    GLUCOSE 93 03/18/2020    PROT 4.4 03/18/2020    LABALBU 2.4 03/18/2020    CALCIUM 7.6 03/18/2020    BILITOT 0.4 03/18/2020    ALKPHOS 58 03/18/2020    AST 48 03/18/2020     03/18/2020     Magnesium:    Lab Results   Component Value Date    MG 2.0 03/18/2020     Phosphorus:    Lab Results   Component Value Date    PHOS 5.7 03/18/2020     Radiology Review:      CT Abdomen and pelvis 3/14/20   1. No acute inflammatory changes omental mesenteric fat planes free   intraperitoneal air, bowel obstruction or ascites. .       2. Fluid contents in the colon can indicate impending diarrhea-like   condition. Chest x-ray March 13, 2020   Tortuous ectatic aorta   Cardiomegaly   Airspace disease compatible with pneumonia, at the right lung base                         Results for Katerine Pro (MRN [id number]) as of 3/17/2020 13:31   Ref.  Range 3/14/2020 06:00   Ferritin Latest Units: ng/mL 1,036   Iron Latest Ref Range: 37 - 145 mcg/dL 36 (L)   Iron Saturation Latest Ref Range: 15 - 50 % 13 (L)   TIBC Latest Ref Range: 250 - 450 mcg/dL 269       BRIEF SUMMARY OF INITIAL CONSULT:    Briefly Mrs. Lashaun De Jesus is a 20-year-old woman with history of HTN, COPD, alcohol abuse, who was admitted on March 13, 2020 transferred from [facility] where she initially presented with left leg weakness and was found to have creatinine level of 3.4 m/dL and a potassium level >7 mEq/L. In view of the lack of vascular surgeon available this week and the consideration of need of dialysis with possible need of dialysis catheter placement patient was transferred to this hospital.  Prior to admission patient reports having nausea vomiting and diarrhea. She denies taking NSAIDs. Her medications prior to admission included spironolactone and potassium supplementation. Problems resolved:  · Hyperkalemia, multifactorial due to KAYLA, potassium intake and spironolactone. Potassium levels improved. IMPRESSION/RECOMMENDATIONS:      1. KAYLA stage III, established ischemic ATN, Started on RRT, 3/15. Remains anuric, for HD x 4 hours today. 2. High anion gap metabolic acidosis (renal failure), improving  3. Hypocalcemia 2/2  vitamin D deficiency, to start ergocalciferol  4. HTN, on metoprolol  5. New onset AF, with RVR, on metoprolol, diltiazem drip, unable to anticoagulate ? GIB  6. Right lung pneumonia, procalcitonin 0.70, on ampicillin-sulbactam  7. Shock liver, LFTs continue to improve  8. Thrombocytopenia  9. COPD, methylprednisolone  10.  Anemia, +FOBT, s/p EGD which was normal      Plan:    · Hemodialysis today with 2 kg volume off as tolerated, orders reviewed with the dialysis nurse  · Anemia panel noted, Ferritin is high, no need for IV iron, start Epogen 3000 units SQ three times per week

## 2020-03-18 NOTE — CARE COORDINATION
3/18 Transition of Care. Adryan was RRT and transferred back to MICU, she is now Intubated follows commands Plan for tunneled HD catheter today with Dr. Xander Morales. She will be a new HD patient will need to be set up with OP HD once ready for discharge. With change in status will probably need YASMINE Iqbal to address with her Son or her when off vent. (See CM note from 3/16 for more details CMSW will follow Karma.

## 2020-03-18 NOTE — ADT AUTH CERT
Systemic or Infectious Condition GRG - Care Day 5 (3/17/2020) by Severiano Christy RN         Review Status Review Entered   Completed 3/18/2020 11:14       Criteria Review      Care Day: 5 Care Date: 3/17/2020 Level of Care:    Guideline Day 2    Clinical Status    ( ) * No ICU or intermediate care needs    Interventions    (X) Inpatient interventions continue    * Milestone   Additional Notes   3- - ICU - INTUBATED       ** ** ** NOTE SIGNIFICANT EVENT 3- @ 10:21pm    FLOOR NURSE NOTE   Pt was a transfer from MICU this evening. Went in pts room to notify I would be bringing in night meds. approximately 20 minutes later this RN walked into room and found pt on left side lying on the floor mumbling. Tessio was pulled out. Pt was transferred to bed, RRT was called and  ** ** pt was tx back to MICU      INTENSIVISTS    Patient admitted to MICU last Friday and was transferred out today. Treated for aspiration pneumonia, atrial fibrillation (no oac because of GIB), COPD exacerbation, KAYLA and hyperkalemia on HD, anemia, and thrombocytopenia. Patient found on the floor, confused, and had pulled her R femoral HD cath out. (+) extensive blood loss, not immediately resolved with pressure. Patient saturating in 70s, altered, and not following commands. R hip externally rotated. Multiple bruises on the arms noted. Briefly went into afib RVR 130s. ** ** ** Patient intubated for airway protection (meds given: versed 4mg + fentanyl 100 mg). Propofol gtt started. SBP 90s - 100s, previously SBP was 150s - 190s. Bleeding from previous HD site eventually controlled with pressure, with discoloration noted over the site but no obvious hematoma.        [id number] @ 12:13AM - INTENSIVISTS   Procedure: right internal jugular vein Triple Lumen Catheter placement.       Indications: vascular access          Assessment   1. Acute encephalopathy - question of alcohol withdrawal (admitted 4 days ago).     2. Hypotension, stopped @ 0642am          Marni neb BID    ·  [Held by provider] heparin (porcine), 5,000 Units, Subcutaneous, 3 times per day    pantoprazole, 40 mg, Intravenous, Daily    folic acid, 1 mg, Intravenous, Daily    thiamine, 100 mg, Intravenous, Daily     Metoprolol 5mg IV Q6H     metoprolol succinate, 50 mg, Oral, Daily   hydrALAZINE, 25 mg, Oral, 3 times per day   Solumedrl 40mg IV Q8H    ampicillin-sulbactam, 3 g, Intravenous, Q24H    ferrous sulfate, 325 mg, Oral, BID WC     ipratropium-albuterol, 1 ampule, Inhalation, Q4H WA    budesonide, 250 mcg, Nebulization, BID      Ca Gluconate 2g IV X 1          PRN:   ** ** ** Apresoline 5mg IV X 1 dose- BP of 196/105

## 2020-03-18 NOTE — OP NOTE
George Germaning  1950      DATE OF PROCEDURE: 3/18/2020     SURGEON: Lorilee Hatchet, M.D.     ASSISTANT:      PREOPERATIVE DIAGNOSIS: Acute on chronic renal failure. POSTOPERATIVE DIAGNOSIS: Same    OPERATION: Insertion of right internal jugular vein tunneled hemodialysis catheter (35419), fluoroscopy (46164-56)     ANESTHESIA: GET     ESTIMATED BLOOD LOSS: Minimal.     COMPLICATIONS: None    DESCRIPTION OF PROCEDURE: The patient was identified and the procedure was confirmed. The neck and chest were prepped and draped in the usual sterile fashion. Next, 1% lidocaine mixed with 0.25% Marcaine was used for local anesthesia. The right internal jugular vein was percutaneously entered and a guidewire was advanced into the superior vena cava under fluoroscopic guidance. A small incision was made around the wire. The catheter was pulled through a subcutaneous tunnel from an inferior chest stab incision to the neck incision. The dilators were passed over the wire followed by the introducer. The catheter was passed through the introducer and the tip was positioned in the superior vena cava under fluoroscopic guidance. Both lumens were noted to withdrawal and flush blood easily and were flushed with heparinized saline solution. They catheter was secured to the skin with nylon sutures and the neck incision was approximated with an interrupted Vicryl suture. Sterile dressings were applied to the incisions in the operating room. Needle, sponge, and instrument counts were reported as correct times two. The patient tolerated the procedure and was transferred to the recovery area in satisfactory condition.

## 2020-03-18 NOTE — PROGRESS NOTES
Subjective: The patient is intubated / sedated         Objective:    BP (!) 147/59   Pulse 94   Temp 98.4 °F (36.9 °C) (Esophageal)   Resp 16   Ht 5' 1\" (1.549 m)   Wt 97 lb 7.1 oz (44.2 kg)   SpO2 99%   BMI 18.41 kg/m²     In: 150 [I.V.:150]  Out: 57     HEENT: NCAT,  PERRLA, No JVD  Heart: Irregular no murmurs, gallops, or rubs.   Lungs:  CTA bilaterally, no wheeze, rales or rhonchi  Abd: bowel sounds present, nontender, nondistended, no masses  Extrem:  No clubbing, cyanosis, or edema     Recent Labs     03/16/20 2230 03/17/20  0515 03/17/20  1500 03/17/20  2155 03/18/20  0455   WBC 9.6 10.7  --   --  10.6   HGB 7.4* 8.1* 8.3* 8.5* 8.0*   HCT 24.2* 25.2* 26.2* 26.2* 25.1*   PLT 95* 67*  --   --  85*       Recent Labs     03/16/20 2230 03/17/20  0515 03/18/20  0455    132 136   K 4.5 4.2 4.4  4.4   CL 93* 98 97*   CO2 19* 21* 20*   BUN 28* 31* 41*   CREATININE 2.8* 2.9* 4.1*   CALCIUM 7.7* 7.3* 7.6*       Assessment:    Patient Active Problem List   Diagnosis    Hyperkalemia    Atrial fibrillation (HCC)    Acute renal insufficiency    Chronic heart failure with preserved ejection fraction (HFpEF) (HCC)    Acute and chr resp failure, unsp w hypoxia or hypercapnia (HCC)    Essential hypertension       Plan:    Admit to medical intensive care unit for multiple issues  Transferred out of icu 3/16- RRT called for AMS and pt pulling out HD catheter - profuse bleeding with hypotension - transferred back to icu and intubated for airway protection   Discussed with dr. Parish Jason to extubate      Acute anemia after pulling out HD catheter   Status post EGD-grossly normal,   serial H&H's-stable  For placement of tessio  General surgery following     Hyperkalemia with acute renal failure  Offending agents in the form of Aldactone/oral potassium supplements discontinued  IV fluid hydration  Ongoing hemodialysis  For tessio   BUN/creatinine remains 72/4.7    Accelerated hypertension-i  improved on

## 2020-03-18 NOTE — PROGRESS NOTES
[facility]  Department of Internal Medicine   MICU Progress Note    Patient:  Sharman Lundborg 68 y.o. female   MRN: [id number]       Date of Service: 3/18/2020      Chief Complaint:  Fall and Bleeding     History of Present Illness      The patient has 49-year-old female with past medical history of COPD, hypertension, CKD, atrial fibrillation. The patient was transferred from the medical ICU yesterday (on 3/16/2020), is being treated for aspiration pneumonia, COPD exacerbation, hyperkalemia with KAYLA and CKD on hemodialysis, was found to have GI bleed, anticoagulation was stopped. On the same day (on 3/16/2020) RRT was called after the patient had a fall and bled from the side of her femoral line. The patient was found to be hypoxic (oxygen saturation dropped to 78%), and not able to protect her airway, a decision was made to intubate the patient and transferred her to the medical ICU for further management. I saw and examined the patient in the AM today. Sedated on fentanyl and PRN versed. Vitals have been stable in the last 24 hours. Past Medical History:  History reviewed. No pertinent past medical history. Past Surgical History:        Procedure Laterality Date    UPPER GASTROINTESTINAL ENDOSCOPY N/A 3/15/2020    EGD DIAGNOSTIC ONLY performed by Nazario Fine MD at [address] [place] [address]       Prior to Admission medications    Medication Sig Start Date End Date Taking? Authorizing Provider   famotidine (PEPCID) 20 MG tablet Take 1 tablet by mouth daily 3/16/20  Yes Raoul Kamara MD   dilTIAZem (DILACOR XR) 180 MG extended release capsule Take 180 mg by mouth daily 2 CAP PO DAILY   Yes Historical Provider, MD   spironolactone (ALDACTONE) 25 MG tablet Take 25 mg by mouth daily HALF TAB   Yes Historical Provider, MD   triamcinolone (KENALOG) 0.025 % cream Apply topically 2 times daily.  9/3/19  Yes Lacey Murray APRN - PETE   metoprolol succinate (TOPROL XL) 100 MG extended release tablet TAKE ONE TABLET BY MOUTH EVERY DAY 7/8/19  Yes Historical Provider, MD   VENTOLIN  (90 Base) MCG/ACT inhaler INHALE 2 PUFFS EVERY 4 HOURS AS NEEDED FOR SHORTNESS OF BREATH 7/8/19  Yes Historical Provider, MD   potassium citrate (UROCIT-K) 10 MEQ (1080 MG) extended release tablet Take by mouth 3 times daily (with meals)    Historical Provider, MD   potassium chloride (MICRO-K) 10 MEQ extended release capsule Take 20 mEq by mouth daily    Historical Provider, MD   predniSONE (DELTASONE) 20 MG tablet Take 20 mg by mouth daily    Historical Provider, MD       Allergies:  Patient has no known allergies. Social History:   TOBACCO:   reports that she has been smoking cigarettes. She has been smoking about 1.00 pack per day. She does not have any smokeless tobacco history on file. ETOH:   reports current alcohol use. OCCUPATION:      Family History:   No family history on file. REVIEW OF SYSTEMS:  Was not obtained due to the patient's condition. Physical Exam       Physical Exam:  · Vitals: BP (!) 132/53   Pulse 84   Temp 98.4 °F (36.9 °C)   Resp 19   Ht 5' 1\" (1.549 m)   Wt 97 lb 7.1 oz (44.2 kg)   SpO2 100%   BMI 18.41 kg/m²     I & O - 24hr: I/O this shift:  In: -   · Out: 25 [Urine:25]   · General Appearance: Sedated and Intubated  · HEENT:  Head: Normocephalic, no lesions, without obvious abnormality. · Neck: no adenopathy, no carotid bruit, no JVD, supple, symmetrical, trachea midline and thyroid not enlarged, symmetric, no tenderness/mass/nodules  · Lung: Bilateral expiratory wheezes, bilateral basilar crackles.   · Heart: regular rate and rhythm, S1, S2 normal, no murmur, click, rub or gallop  · Abdomen: soft, non-tender; bowel sounds normal; no masses,  no organomegaly  · Extremities:  extremities normal, atraumatic, no cyanosis or edema  · Musculokeletal: No joint swelling   · Neurologic: Mental status: Alert, oriented, thought content appropriate    Labs     CBC:   Lab Results   Component Value Date WBC 10.6 03/18/2020    RBC 2.75 03/18/2020    HGB 8.0 03/18/2020    HCT 25.1 03/18/2020    PLT 85 03/18/2020    MCV 91.3 03/18/2020     BMP:    Lab Results   Component Value Date     03/18/2020    K 4.4 03/18/2020    K 4.4 03/18/2020    CL 97 03/18/2020    CO2 20 03/18/2020    BUN 41 03/18/2020    CREATININE 4.1 03/18/2020    GLUCOSE 93 03/18/2020    CALCIUM 7.6 03/18/2020     Hepatic Function Panel:    Lab Results   Component Value Date    ALKPHOS 58 03/18/2020    AST 48 03/18/2020     03/18/2020    PROT 4.4 03/18/2020    LABALBU 2.4 03/18/2020    BILITOT 0.4 03/18/2020     Cardiac Enzymes:   Lab Results   Component Value Date    TROPONINI 0.05 (H) 03/14/2020    TROPONINI 0.05 (H) 03/14/2020     PT/INR:    Lab Results   Component Value Date    PROTIME 14.2 03/16/2020    INR 1.3 03/16/2020     ABG:  No results found for: PHART, NIP5VYG, PO2ART, Y0WHYOHK, GIO7EDI, BEART  TSH:    Lab Results   Component Value Date    TSH 4.610 03/13/2020        Notable Cultures:       Culture  Date sent  Result    Nasal      Sputum      Body Fluid      Urine Strep pneumonia Ag        Urine Legionella Ag        Blood        Urine          Antibiotic  Days  Day started                                       Oxygen:   Nasal cannula L/min     Face mask %     Reservoirs mask %       ABG   Vent Settings     PH   Mode      PCO2   TV      PO2   RR      HCO3   PS      Sat%   PEEP      FIO2   FIO2        P/F          Lines:  Site  Day  Date inserted     TLC              PICC              Arterial line              Peripheral line                           DVT Prophylaxis      Heparin         Enoxaparin        PCDs        Imaging studies:   Imaging  Date  Result    CXR                       EKG: Rhythm Strip: normal sinus rhythm, unchanged from previous tracings. Resident's Assessment & Plan     Neurology    Sedated on fentanyl and versed.      Acute Encephalopathy 2/2 Alcohol withdrawal    Unremrkable CT head, normal ammonia THERESA Rodriguez,  on 3/18/2020 at 5:30 PM

## 2020-03-18 NOTE — PROGRESS NOTES
Occupational Therapy  Patient on OT caseload and in possible need of re-evaluation. Pt not following commands. OT to re-attempt at a later time. Thank you.      Cindy [facility]   [id number]

## 2020-03-18 NOTE — PROGRESS NOTES
Patient's partner came to hospital to deliver DNR papers for chart. Gave family an update on patient's condition and gave patient's purse to family to take home.

## 2020-03-18 NOTE — PROGRESS NOTES
[address] [facility]  Department of Internal Medicine  Division of Pulmonary, Critical Care and Sleep Medicine  Progress Note      Patient:  Mira Perez 68 y.o. female  MRN: [id number]     Date of Service: 3/18/2020    Allergy: Patient has no known allergies. Subjective       Intubated on 3/17 after RRT  3/18 Insertion of right internal jugular vein tunneled hemodialysis catheter   Continued episodes of A. fib with RVR noted. Hemoglobin dropped from 10.9-7.4 and blood was transfused. EGD normal      Objective   I & O - 24hr:     Intake/Output Summary (Last 24 hours) at 3/18/2020 1427  Last data filed at 3/18/2020 1400  Gross per 24 hour   Intake 150 ml   Output 61 ml   Net 89 ml       Physical Exam:   · Vitals: BP (!) 147/59   Pulse 92   Temp 98.4 °F (36.9 °C) (Esophageal)   Resp 14   Ht 5' 1\" (1.549 m)   Wt 97 lb 7.1 oz (44.2 kg)   SpO2 100%   BMI 18.41 kg/m²       Constitutional: Sedated, Alert. Head: Normocephalic and atraumatic. Eyes: PERRL, (-) conjunctivitis, (-) scleral icterus. Mucus membranes moist.  Neck: (-)  swelling, (-) JVD   Respiratory: (+) bilateral  wheezes, (-)  rales, (-)  rhonchi. Cardiovascular: RRR. (-)  murmurs,   GI:  Abdomen soft, (-) tenderness (-) distension  (+) BS. (-)  rebound, (-) guarding, (-) rigidity. Extremities:  (-) clubbing, (-) cyanosis. 2+ distal pulses. (-) peripheral edema. Neurologic:  Sedated No focal neurological deficits.       Labs     CBC:   Lab Results   Component Value Date    WBC 10.6 03/18/2020    RBC 2.75 03/18/2020    HGB 8.0 03/18/2020    HCT 25.1 03/18/2020    MCV 91.3 03/18/2020    MCH 29.1 03/18/2020    MCHC 31.9 03/18/2020    RDW 16.5 03/18/2020    PLT 85 03/18/2020    MPV 12.1 03/18/2020     BMP:    Lab Results   Component Value Date     03/18/2020    K 4.4 03/18/2020    K 4.4 03/18/2020    CL 97 03/18/2020    CO2 20 03/18/2020    BUN 41 03/18/2020    LABALBU 2.4 03/18/2020    CREATININE 4.1 03/18/2020    CALCIUM 7.6 03/18/2020    GFRAA 13 03/18/2020    LABGLOM 11 03/18/2020    GLUCOSE 93 03/18/2020     Monitor :  Sinus with pauses occ runs of afib  CXR We reviewed the films during the inter-disciplinary rounds/conference, which showed lines and tubes ok and changes of COPD      Assessment and Plan   Julia Springer is a 68 y.o. female who was brought to the MICU for 1. Atrial fibrillation with RVR, 2. Unable to use [address] in the setting of GIB, 3. Hypertensive emergency --> uncontrolled 664B systolic, 4. KAYLA - nephrology following  --> ESRD on HD now  5. Hyperkalemia, 6. Acute respiratory failure on Bipap, 7. COPD with hx of tobacco use  8. Transaminitis, 9. Hx of ETOH abuse, 10. GIB - melanotic stools and positive FOBT. With negative EGD  IMPRESSION:  1. Atrial fibrillation with RVR -  Currently on cardizem gtt at 5 mg/hr  2. Unable to use [employer] [address] in the setting of GIB  3. Hypertensive emergency --> uncontrolled 835H systolic  4. KAYLA - nephrology following  --> ESRD on HD now  5. Hyperkalemia  6. Acute respiratory failure on Bipap  7. COPD with hx of tobacco use  8. Transaminitis  9. Hx of ETOH abuse  10. GIB - melanotic stools and positive FOBT.  Surgery following.      Acute metabolic encephalopathy of unclear etiology (resolved)  · Normal ammonia  · ABG negative for hypercapnia  · Elevated TSH with normal T4  · A a OX3 today  A. fib  · New onset  · Responds well to Lopressor  · Continue Lopressor every 6 hours  · Chads vas >2 but currently GI bleed  · We will hold anticoagulation at this time  · Diltiazem drip stopped due to sinus arrest  ·   Hypertensive emergency  · Initial /90  · Remains hypertensive  · Unable to give renal likely agents due to KAYLA  · Not responsive to loop diuretics  · Continue Lopressor every 6 hours with holding parameter for map <120  · Will receive HD today  ·   Heart failure  · History of alcoholism  · Possible dilated cardiomyopathy  · proBNP 49,000  · Follow-up complete echo  ·   Prolonged QTc   · Avoid QT prolonging meds     Pleural effusion most likely 2/2 CHF  · CXR initially concerning for possible pneumonia  · CT abdomen shows right-sided pleural effusion  ·   COPD stable  · COPD on home oxygen at 2 L  · Diffuse bilateral wheezing  · Started on Brovana, Pulmicort, duo nebs   · Wean OFF Solu-Medrol     Transaminitis 2/2 alcoholic hepatitis  · Initial  AST 1300   · Acetaminophen levels within normal limits  · Awaiting urine and serum drug screening  · CT abdomen unremarkable  · Follow-up CMV EBV DELANO  · Follow-up hepatitis panel  · Continue to trend LFTs  · CT abdomen unremarkable for acute intra-abdominal pathologies  · Follow-up CK  ·   Pancreatitis? · Currently n.p.o.   · Was on fluids but stopped due to possible CHF exacerbation  · Lipase 492: Downtrending  · Abdominal pain in the upper left quadrant  · History of alcoholism  ·   Upper GI bleed  · Melanotic stools  · FOBT positive  · General surgery consulted  · We will transfuse if hemoglobin <7  · Continue Protonix BID   · For EGD negative  ·   Alcoholism  · Folate B12 within normal limits  · Has had history of withdrawal in the past  · Given age [de-identified] not in place  · Will most likely need Precedex if patient becomes symptomatic use benzodiazepines sparingly     KAYLA most likely prerenal  · CR from approximately 5 days ago 1.2  · Initial CR 3.4  · Currently up trending  · Follow-up urine studies  · Minimal urine output   · Nephrology consulted  · Continue to monitor renal function  ·   Hyperkalemia 2/2 oral potassium, spironolactone and dehydration  · Hold home potassium and spironolactone  · Initial EKG showed peaked T waves  · Received calcium gluconate, insulin D50 albuterol and Kayexalate  · We will continue to monitor potassium  ·   High anion gap metabolic acidosis   · Normal lactic acid  · Minimal increase in serum osmolar gap       Normocytic anemia  · Low iron and iron

## 2020-03-19 ENCOUNTER — APPOINTMENT (OUTPATIENT)
Dept: GENERAL RADIOLOGY | Age: 70
DRG: 673 | End: 2020-03-19
Attending: INTERNAL MEDICINE
Payer: COMMERCIAL

## 2020-03-19 LAB
AADO2: 125.2 MMHG
AADO2: 127 MMHG
ALBUMIN SERPL-MCNC: 3.5 G/DL (ref 3.5–5.2)
ALP BLD-CCNC: 50 U/L (ref 35–104)
ALT SERPL-CCNC: 118 U/L (ref 0–32)
ANION GAP SERPL CALCULATED.3IONS-SCNC: 16 MMOL/L (ref 7–16)
ANISOCYTOSIS: ABNORMAL
AST SERPL-CCNC: 31 U/L (ref 0–31)
B.E.: -0.4 MMOL/L (ref -3–3)
B.E.: 2 MMOL/L (ref -3–3)
BASOPHILIC STIPPLING: ABNORMAL
BASOPHILS ABSOLUTE: 0 E9/L (ref 0–0.2)
BASOPHILS RELATIVE PERCENT: 0 % (ref 0–2)
BILIRUB SERPL-MCNC: 1 MG/DL (ref 0–1.2)
BUN BLDV-MCNC: 15 MG/DL (ref 8–23)
CALCIUM SERPL-MCNC: 8.1 MG/DL (ref 8.6–10.2)
CHLORIDE BLD-SCNC: 100 MMOL/L (ref 98–107)
CO2: 24 MMOL/L (ref 22–29)
COHB: 0.4 % (ref 0–1.5)
COHB: 0.6 % (ref 0–1.5)
CREAT SERPL-MCNC: 2.2 MG/DL (ref 0.5–1)
CRITICAL: ABNORMAL
CRITICAL: ABNORMAL
DATE ANALYZED: ABNORMAL
DATE ANALYZED: ABNORMAL
DATE OF COLLECTION: ABNORMAL
DATE OF COLLECTION: ABNORMAL
EKG ATRIAL RATE: 87 BPM
EKG P AXIS: 71 DEGREES
EKG P-R INTERVAL: 120 MS
EKG Q-T INTERVAL: 380 MS
EKG QRS DURATION: 120 MS
EKG QTC CALCULATION (BAZETT): 457 MS
EKG R AXIS: 72 DEGREES
EKG T AXIS: 17 DEGREES
EKG VENTRICULAR RATE: 87 BPM
EOSINOPHILS ABSOLUTE: 0 E9/L (ref 0.05–0.5)
EOSINOPHILS RELATIVE PERCENT: 0 % (ref 0–6)
FIO2: 40 %
FIO2: 40 %
GFR AFRICAN AMERICAN: 27
GFR NON-AFRICAN AMERICAN: 22 ML/MIN/1.73
GLUCOSE BLD-MCNC: 104 MG/DL (ref 74–99)
HCO3: 25.9 MMOL/L (ref 22–26)
HCO3: 27 MMOL/L (ref 22–26)
HCT VFR BLD CALC: 24.1 % (ref 34–48)
HCT VFR BLD CALC: 24.8 % (ref 34–48)
HCT VFR BLD CALC: 25 % (ref 34–48)
HCT VFR BLD CALC: 26.8 % (ref 34–48)
HEMOGLOBIN: 7.6 G/DL (ref 11.5–15.5)
HEMOGLOBIN: 7.9 G/DL (ref 11.5–15.5)
HEMOGLOBIN: 8 G/DL (ref 11.5–15.5)
HEMOGLOBIN: 8.4 G/DL (ref 11.5–15.5)
HHB: 2.8 % (ref 0–5)
HHB: 3.8 % (ref 0–5)
LAB: ABNORMAL
LAB: ABNORMAL
LIPASE: 33 U/L (ref 13–60)
LYMPHOCYTES ABSOLUTE: 0.42 E9/L (ref 1.5–4)
LYMPHOCYTES RELATIVE PERCENT: 4 % (ref 20–42)
Lab: ABNORMAL
Lab: ABNORMAL
MAGNESIUM: 1.9 MG/DL (ref 1.6–2.6)
MCH RBC QN AUTO: 28.7 PG (ref 26–35)
MCHC RBC AUTO-ENTMCNC: 31.5 % (ref 32–34.5)
MCV RBC AUTO: 90.9 FL (ref 80–99.9)
METER GLUCOSE: 101 MG/DL (ref 74–99)
METER GLUCOSE: 109 MG/DL (ref 74–99)
METHB: 0.3 % (ref 0–1.5)
METHB: 0.6 % (ref 0–1.5)
MODE: ABNORMAL
MODE: AC
MONOCYTES ABSOLUTE: 0.85 E9/L (ref 0.1–0.95)
MONOCYTES RELATIVE PERCENT: 8 % (ref 2–12)
NEUTROPHILS ABSOLUTE: 9.33 E9/L (ref 1.8–7.3)
NEUTROPHILS RELATIVE PERCENT: 88 % (ref 43–80)
O2 CONTENT: 12.2 ML/DL
O2 CONTENT: 12.5 ML/DL
O2 SATURATION: 96.2 % (ref 92–98.5)
O2 SATURATION: 97.2 % (ref 92–98.5)
O2HB: 95.3 % (ref 94–97)
O2HB: 96.2 % (ref 94–97)
OPERATOR ID: 2577
OPERATOR ID: ABNORMAL
OVALOCYTES: ABNORMAL
PATIENT TEMP: 37 C
PATIENT TEMP: 37 C
PCO2: 43.6 MMHG (ref 35–45)
PCO2: 50.6 MMHG (ref 35–45)
PDW BLD-RTO: 16.5 FL (ref 11.5–15)
PEEP/CPAP: 5 CMH2O
PEEP/CPAP: 5 CMH2O
PFO2: 2.25 MMHG/%
PFO2: 2.5 MMHG/%
PH BLOOD GAS: 7.33 (ref 7.35–7.45)
PH BLOOD GAS: 7.41 (ref 7.35–7.45)
PHOSPHORUS: 3.2 MG/DL (ref 2.5–4.5)
PLATELET # BLD: 74 E9/L (ref 130–450)
PLATELET CONFIRMATION: NORMAL
PLATELET CONFIRMATION: NORMAL
PMV BLD AUTO: 11.5 FL (ref 7–12)
PO2: 90 MMHG (ref 75–100)
PO2: 99.9 MMHG (ref 75–100)
POIKILOCYTES: ABNORMAL
POLYCHROMASIA: ABNORMAL
POTASSIUM SERPL-SCNC: 3.7 MMOL/L (ref 3.5–5)
PS: 8 CMH20
RBC # BLD: 2.65 E12/L (ref 3.5–5.5)
RI(T): 1.25
RI(T): 1.41
RR MECHANICAL: 14 B/MIN
SODIUM BLD-SCNC: 140 MMOL/L (ref 132–146)
SOURCE, BLOOD GAS: ABNORMAL
SOURCE, BLOOD GAS: ABNORMAL
THB: 8.9 G/DL (ref 11.5–16.5)
THB: 9.2 G/DL (ref 11.5–16.5)
TIME ANALYZED: 1641
TIME ANALYZED: 509
TOTAL PROTEIN: 5.1 G/DL (ref 6.4–8.3)
VT MECHANICAL: 400 ML
WBC # BLD: 10.6 E9/L (ref 4.5–11.5)

## 2020-03-19 PROCEDURE — 6360000002 HC RX W HCPCS: Performed by: STUDENT IN AN ORGANIZED HEALTH CARE EDUCATION/TRAINING PROGRAM

## 2020-03-19 PROCEDURE — 6370000000 HC RX 637 (ALT 250 FOR IP): Performed by: INTERNAL MEDICINE

## 2020-03-19 PROCEDURE — 2580000003 HC RX 258: Performed by: INTERNAL MEDICINE

## 2020-03-19 PROCEDURE — 85018 HEMOGLOBIN: CPT

## 2020-03-19 PROCEDURE — 82962 GLUCOSE BLOOD TEST: CPT

## 2020-03-19 PROCEDURE — 36415 COLL VENOUS BLD VENIPUNCTURE: CPT

## 2020-03-19 PROCEDURE — 36430 TRANSFUSION BLD/BLD COMPNT: CPT

## 2020-03-19 PROCEDURE — 85014 HEMATOCRIT: CPT

## 2020-03-19 PROCEDURE — 94640 AIRWAY INHALATION TREATMENT: CPT

## 2020-03-19 PROCEDURE — 99291 CRITICAL CARE FIRST HOUR: CPT | Performed by: INTERNAL MEDICINE

## 2020-03-19 PROCEDURE — 94799 UNLISTED PULMONARY SVC/PX: CPT

## 2020-03-19 PROCEDURE — 6370000000 HC RX 637 (ALT 250 FOR IP): Performed by: STUDENT IN AN ORGANIZED HEALTH CARE EDUCATION/TRAINING PROGRAM

## 2020-03-19 PROCEDURE — 82805 BLOOD GASES W/O2 SATURATION: CPT

## 2020-03-19 PROCEDURE — 37799 UNLISTED PX VASCULAR SURGERY: CPT

## 2020-03-19 PROCEDURE — 85025 COMPLETE CBC W/AUTO DIFF WBC: CPT

## 2020-03-19 PROCEDURE — 5A1D70Z PERFORMANCE OF URINARY FILTRATION, INTERMITTENT, LESS THAN 6 HOURS PER DAY: ICD-10-PCS | Performed by: INTERNAL MEDICINE

## 2020-03-19 PROCEDURE — 83690 ASSAY OF LIPASE: CPT

## 2020-03-19 PROCEDURE — 6360000002 HC RX W HCPCS

## 2020-03-19 PROCEDURE — 2500000003 HC RX 250 WO HCPCS: Performed by: INTERNAL MEDICINE

## 2020-03-19 PROCEDURE — 2580000003 HC RX 258: Performed by: SURGERY

## 2020-03-19 PROCEDURE — 90935 HEMODIALYSIS ONE EVALUATION: CPT | Performed by: INTERNAL MEDICINE

## 2020-03-19 PROCEDURE — 71045 X-RAY EXAM CHEST 1 VIEW: CPT

## 2020-03-19 PROCEDURE — 6370000000 HC RX 637 (ALT 250 FOR IP): Performed by: SURGERY

## 2020-03-19 PROCEDURE — 83735 ASSAY OF MAGNESIUM: CPT

## 2020-03-19 PROCEDURE — 2000000000 HC ICU R&B

## 2020-03-19 PROCEDURE — C9113 INJ PANTOPRAZOLE SODIUM, VIA: HCPCS | Performed by: STUDENT IN AN ORGANIZED HEALTH CARE EDUCATION/TRAINING PROGRAM

## 2020-03-19 PROCEDURE — 6360000002 HC RX W HCPCS: Performed by: INTERNAL MEDICINE

## 2020-03-19 PROCEDURE — 6360000002 HC RX W HCPCS: Performed by: SURGERY

## 2020-03-19 PROCEDURE — 80053 COMPREHEN METABOLIC PANEL: CPT

## 2020-03-19 PROCEDURE — 94003 VENT MGMT INPAT SUBQ DAY: CPT

## 2020-03-19 PROCEDURE — 90945 DIALYSIS ONE EVALUATION: CPT

## 2020-03-19 PROCEDURE — 84100 ASSAY OF PHOSPHORUS: CPT

## 2020-03-19 RX ORDER — HYDRALAZINE HYDROCHLORIDE 20 MG/ML
10 INJECTION INTRAMUSCULAR; INTRAVENOUS ONCE
Status: COMPLETED | OUTPATIENT
Start: 2020-03-19 | End: 2020-03-19

## 2020-03-19 RX ORDER — PROPOFOL 10 MG/ML
10 INJECTION, EMULSION INTRAVENOUS
Status: DISCONTINUED | OUTPATIENT
Start: 2020-03-19 | End: 2020-03-21

## 2020-03-19 RX ORDER — HYDRALAZINE HYDROCHLORIDE 20 MG/ML
INJECTION INTRAMUSCULAR; INTRAVENOUS
Status: COMPLETED
Start: 2020-03-19 | End: 2020-03-19

## 2020-03-19 RX ORDER — METOPROLOL TARTRATE 50 MG/1
50 TABLET, FILM COATED ORAL 2 TIMES DAILY
Status: DISCONTINUED | OUTPATIENT
Start: 2020-03-19 | End: 2020-03-21

## 2020-03-19 RX ORDER — MIDAZOLAM HYDROCHLORIDE 1 MG/ML
0.5 INJECTION INTRAMUSCULAR; INTRAVENOUS EVERY 4 HOURS PRN
Status: DISCONTINUED | OUTPATIENT
Start: 2020-03-19 | End: 2020-03-26 | Stop reason: HOSPADM

## 2020-03-19 RX ORDER — LABETALOL HYDROCHLORIDE 5 MG/ML
10 INJECTION, SOLUTION INTRAVENOUS EVERY 4 HOURS PRN
Status: DISCONTINUED | OUTPATIENT
Start: 2020-03-19 | End: 2020-03-26 | Stop reason: HOSPADM

## 2020-03-19 RX ADMIN — HYDRALAZINE HYDROCHLORIDE 10 MG: 20 INJECTION INTRAMUSCULAR; INTRAVENOUS at 10:45

## 2020-03-19 RX ADMIN — PREDNISONE 20 MG: 20 TABLET ORAL at 08:47

## 2020-03-19 RX ADMIN — BUDESONIDE 250 MCG: 0.25 SUSPENSION RESPIRATORY (INHALATION) at 08:40

## 2020-03-19 RX ADMIN — IPRATROPIUM BROMIDE AND ALBUTEROL SULFATE 1 AMPULE: 2.5; .5 SOLUTION RESPIRATORY (INHALATION) at 20:38

## 2020-03-19 RX ADMIN — CHLORHEXIDINE GLUCONATE 0.12% ORAL RINSE 15 ML: 1.2 LIQUID ORAL at 08:46

## 2020-03-19 RX ADMIN — CHLORHEXIDINE GLUCONATE 0.12% ORAL RINSE 15 ML: 1.2 LIQUID ORAL at 23:12

## 2020-03-19 RX ADMIN — IPRATROPIUM BROMIDE AND ALBUTEROL SULFATE 1 AMPULE: 2.5; .5 SOLUTION RESPIRATORY (INHALATION) at 11:48

## 2020-03-19 RX ADMIN — LABETALOL HYDROCHLORIDE 10 MG: 5 INJECTION, SOLUTION INTRAVENOUS at 08:42

## 2020-03-19 RX ADMIN — Medication 10 ML: at 08:46

## 2020-03-19 RX ADMIN — IPRATROPIUM BROMIDE AND ALBUTEROL SULFATE 1 AMPULE: 2.5; .5 SOLUTION RESPIRATORY (INHALATION) at 08:40

## 2020-03-19 RX ADMIN — FOLIC ACID 1 MG: 5 INJECTION, SOLUTION INTRAMUSCULAR; INTRAVENOUS; SUBCUTANEOUS at 08:51

## 2020-03-19 RX ADMIN — AMLODIPINE BESYLATE 10 MG: 10 TABLET ORAL at 11:00

## 2020-03-19 RX ADMIN — METOPROLOL TARTRATE 50 MG: 50 TABLET, FILM COATED ORAL at 12:42

## 2020-03-19 RX ADMIN — PROPOFOL INJECTABLE EMULSION 10 MCG/KG/MIN: 10 INJECTION, EMULSION INTRAVENOUS at 18:33

## 2020-03-19 RX ADMIN — IPRATROPIUM BROMIDE AND ALBUTEROL SULFATE 1 AMPULE: 2.5; .5 SOLUTION RESPIRATORY (INHALATION) at 17:06

## 2020-03-19 RX ADMIN — Medication 10 ML: at 23:12

## 2020-03-19 RX ADMIN — THIAMINE HYDROCHLORIDE 100 MG: 100 INJECTION, SOLUTION INTRAMUSCULAR; INTRAVENOUS at 08:47

## 2020-03-19 RX ADMIN — PANTOPRAZOLE SODIUM 40 MG: 40 INJECTION, POWDER, FOR SOLUTION INTRAVENOUS at 08:46

## 2020-03-19 RX ADMIN — AMPICILLIN SODIUM AND SULBACTAM SODIUM 3 G: 2; 1 INJECTION, POWDER, FOR SOLUTION INTRAMUSCULAR; INTRAVENOUS at 02:09

## 2020-03-19 RX ADMIN — BUDESONIDE 250 MCG: 0.25 SUSPENSION RESPIRATORY (INHALATION) at 20:38

## 2020-03-19 RX ADMIN — METOPROLOL TARTRATE 50 MG: 50 TABLET, FILM COATED ORAL at 23:12

## 2020-03-19 ASSESSMENT — PAIN SCALES - GENERAL
PAINLEVEL_OUTOF10: 0

## 2020-03-19 ASSESSMENT — PULMONARY FUNCTION TESTS
PIF_VALUE: 33
PIF_VALUE: 33

## 2020-03-19 NOTE — ADT AUTH CERT
Systemic or Infectious Condition GRG - Care Day 6 (3/18/2020) by Melissa Cooper RN         Review Status Review Entered   Completed 3/19/2020 12:46       Criteria Review      Care Day: 6 Care Date: 3/18/2020 Level of Care:    Guideline Day 2    Clinical Status    ( ) * No ICU or intermediate care needs    Interventions    (X) Inpatient interventions continue    * Milestone   Additional Notes    03/18/20 0800  98.8 (37.1)  18  84  -  148/53  Semi fowlers  -  -  100 ventilator    03/18/20 2000  99.1 (37.3)  16  105  120/52Abnormal  120/52  -  -  -  100  ventilator      CXR: Impression:       1. Moderate bilateral pleural effusions and hazy bilateral   atelectasis. Effusions look increased from yesterday. 2. Improving right lower lobe infiltrate. 3. New nasogastric tube with tip in the distal stomach. 3/18/2020 04:55   Sodium: 136   Potassium: 4.4   Chloride: 97 (L)   CO2: 20 (L)   BUN: 41 (H)   Creatinine: 4.1 (H)   Anion Gap: 19 (H)   GFR Non-: 11   GFR African American: 13   Glucose: 93   Calcium: 7.6 (L)      3/18/2020 05:04   Source[de-identified] Blood Arterial   pH, Blood Gas: 7.429   PCO2: 33.8 (L)   pO2: 150.6 (H)   HCO3: 21.9 (L)   Base Excess: -2.0   O2 Sat: 98.7 (H)   tHb (est): 9.0 (L)   O2Hb: 97.4 (H)   COHb: 0.5   MetHb: 0.8   HHb: 1.3   O2 Content: 12.7   AaDO2: 85.7   RI(T): 0.57   FIO2: 40.0   PO2/FIO2: 3.77   Pt Temp: 37.0   Critical(s) Notified: .  No Critical Values   Time Analyzed: 0504   Mode: AC   Peep/Cpap: 5.0   Vt Mechanical: 450.0   Rr Mechanical: 14.0      3/18/2020 15:15   Hemoglobin Quant: 8.2 (L)   Hematocrit: 26.3 (L)      3/18/2020 20:51      3/18/2020 21:00   Sodium: 139   Potassium: 3.8   Chloride: 98   CO2: 26   BUN: 8   Creatinine: 1.2 (H)   Anion Gap: 15   GFR Non-: 44   GFR : 53   Magnesium: 1.9   Glucose: 93   Calcium: 8.5 (L)   Phosphorus: 2.1 (L)      Scheduled Meds:   Albumin 25g iv x2   sodium chloride flush, 10 mL, Intravenous, s/p EGD which was normal       Plan:   · Hemodialysis today with 2 kg volume off as tolerated, orders reviewed with the dialysis nurse   · Anemia panel noted, Ferritin is high, no need for IV iron, start Epogen 3000 units SQ three times per week      Op Note:   PREOPERATIVE DIAGNOSIS: Acute on chronic renal failure.       POSTOPERATIVE DIAGNOSIS: Same       OPERATION: Insertion of right internal jugular vein tunneled hemodialysis catheter (19287), fluoroscopy (54586-12)          Pulm:   Assessment and Plan   George Gibson is a 68 y.o. female who was brought to the MICU for 1. Atrial fibrillation with RVR, 2. Unable to use [address] in the setting of GIB, 3. Hypertensive emergency --> uncontrolled 239H systolic, 4. KAYLA - nephrology following  --> ESRD on HD now   5. Hyperkalemia, 6. Acute respiratory failure on Bipap, 7. COPD with hx of tobacco use   8. Transaminitis, 9. Hx of ETOH abuse, 10. GIB - melanotic stools and positive FOBT. With negative EGD   IMPRESSION:   1. Atrial fibrillation with RVR -  Currently on cardizem gtt at 5 mg/hr   2. Unable to use [address] in the setting of GIB   3. Hypertensive emergency --> uncontrolled 426T systolic   4. KAYLA - nephrology following  --> ESRD on HD now   5. Hyperkalemia   6. Acute respiratory failure on Bipap   7. COPD with hx of tobacco use   8. Transaminitis   9. Hx of ETOH abuse   10. GIB - melanotic stools and positive FOBT.  Surgery following.       Acute metabolic encephalopathy of unclear etiology (resolved)   · Normal ammonia   · ABG negative for hypercapnia   · Elevated TSH with normal T4   · A a OX3 today   A. fib   · New onset   · Responds well to Lopressor   · Continue Lopressor every 6 hours   · Chads vas >2 but currently GI bleed   · We will hold anticoagulation at this time   · Diltiazem drip stopped due to sinus arrest   ·     Hypertensive emergency   · Initial /90   · Remains hypertensive   · Unable to give renal likely agents due to KAYLA   · Not responsive to loop arterial line   4. 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 Atrophy and probable chronic microvascular   ischemic disease.       Summary:  ECHO     Normal left ventricular size and systolic function.    Ejection fraction is visually estimated at 55-60%.  Grade II diastolic dysfunction.    No regional wall motion abnormalities seen.    Mild left ventricular concentric hypertrophy noted.    Abnormal LV septal motion consistent with conduction disorder.    Normal right ventricular size and function.    The left atrium is mildly dilated.    No significant valvular abnormalities.

## 2020-03-19 NOTE — PLAN OF CARE
Problem: Restraint Use - Nonviolent/Non-Self-Destructive Behavior:  Goal: Absence of restraint-related injury  Description: Absence of restraint-related injury  3/19/2020 0801 by Radha Washington RN  Outcome: Met This Shift     Problem: Restraint Use - Nonviolent/Non-Self-Destructive Behavior:  Goal: Absence of restraint indications  Description: Absence of restraint indications  3/19/2020 0801 by Radha Washington RN  Outcome: Not Met This Shift

## 2020-03-19 NOTE — PROGRESS NOTES
Department of Internal Medicine  Nephrology Attending Consult Note    Events reviewed.   Has a new right IJ tunneled dialysis catheter placed today    She remains oliguric    SUBJECTIVE: We are following Mrs. Berger Rater for KAYLA needing dialysis  Remains anuric  Inutbated and sedated  Discussed with the ICU team, plans for extubation noted       PHYSICAL EXAM:      Vitals:    VITALS:  BP (!) 182/98   Pulse 96   Temp 99.7 °F (37.6 °C)   Resp 12   Ht 5' 1\" (1.549 m)   Wt 108 lb (49 kg)   SpO2 92%   BMI 20.41 kg/m²   24HR INTAKE/OUTPUT:      Intake/Output Summary (Last 24 hours) at 3/19/2020 1528  Last data filed at 3/19/2020 1100  Gross per 24 hour   Intake 2464 ml   Output 2525 ml   Net -61 ml     Access: Right IJ tunneled dialysis catheter  Constitutional: Patient is intubated and sedated  HEENT: Pupils are equal reactive, mucous membranes moist no  Respiratory: fine rales in upper lobes, no wheezing  Cardiovascular/Edema: S1 and S2 present  Gastrointestinal: Abdomen soft, pierre with concentrated urine  Neurologic: Nonfocal  Skin: No lesions  Other: No edema    Scheduled Meds:   metoprolol tartrate  50 mg Per NG tube BID    sodium chloride flush  10 mL Intravenous 2 times per day    [Held by provider] heparin (porcine)  5,000 Units Subcutaneous 3 times per day    pantoprazole  40 mg Intravenous Daily    chlorhexidine  15 mL Mouth/Throat BID    folic acid  1 mg Intravenous Daily    thiamine  100 mg Intravenous Daily    epoetin pavan-epbx  3,000 Units Subcutaneous Once per day on Mon Wed Fri    [Held by provider] metoprolol succinate  100 mg Oral Daily    hydrALAZINE  50 mg Oral 3 times per day    amLODIPine  10 mg Oral Daily    ampicillin-sulbactam  3 g Intravenous Q24H    ferrous sulfate  325 mg Oral BID WC    ipratropium-albuterol  1 ampule Inhalation Q4H WA    budesonide  250 mcg Nebulization BID    dextrose  25 g Intravenous Once     Continuous Infusions:   dextrose 5 % and 0.9 % NaCl 40 mL/hr at 03/18/20 1505    dextrose       PRN Meds:.labetalol, midazolam, sodium chloride flush, acetaminophen **OR** acetaminophen, polyethylene glycol, promethazine **OR** ondansetron, albuterol sulfate HFA, anticoagulant sodium citrate, glucagon (rDNA), glucose, dextrose, glucagon (rDNA), dextrose, perflutren lipid microspheres    DATA:    CBC:   Lab Results   Component Value Date    WBC 10.6 03/19/2020    RBC 2.65 03/19/2020    HGB 8.4 03/19/2020    HCT 26.8 03/19/2020    MCV 90.9 03/19/2020    MCH 28.7 03/19/2020    MCHC 31.5 03/19/2020    RDW 16.5 03/19/2020    PLT 74 03/19/2020    MPV 11.5 03/19/2020     CMP:    Lab Results   Component Value Date     03/19/2020    K 3.7 03/19/2020    K 4.4 03/18/2020     03/19/2020    CO2 24 03/19/2020    BUN 15 03/19/2020    CREATININE 2.2 03/19/2020    GFRAA 27 03/19/2020    LABGLOM 22 03/19/2020    GLUCOSE 104 03/19/2020    PROT 5.1 03/19/2020    LABALBU 3.5 03/19/2020    CALCIUM 8.1 03/19/2020    BILITOT 1.0 03/19/2020    ALKPHOS 50 03/19/2020    AST 31 03/19/2020     03/19/2020     Magnesium:    Lab Results   Component Value Date    MG 1.9 03/19/2020     Phosphorus:    Lab Results   Component Value Date    PHOS 3.2 03/19/2020     Radiology Review:      CT Abdomen and pelvis 3/14/20   1. No acute inflammatory changes omental mesenteric fat planes free   intraperitoneal air, bowel obstruction or ascites. .       2. Fluid contents in the colon can indicate impending diarrhea-like   condition. Chest x-ray March 13, 2020   Tortuous ectatic aorta   Cardiomegaly   Airspace disease compatible with pneumonia, at the right lung base                         Results for [place] (MRN [id number]) as of 3/17/2020 13:31   Ref.  Range 3/14/2020 06:00   Ferritin Latest Units: ng/mL 1,036   Iron Latest Ref Range: 37 - 145 mcg/dL 36 (L)   Iron Saturation Latest Ref Range: 15 - 50 % 13 (L)   TIBC Latest Ref Range: 250 - 450 mcg/dL 269       BRIEF SUMMARY OF INITIAL

## 2020-03-19 NOTE — PROGRESS NOTES
Physical Therapy    Pt on PT caseload for re-evaluation. Pt case dicussed at rounds in AM.  Pt currently following minimal commands and for HD this morning. Will follow. Thank you.      Nain Bradford, PT, DPT  [id number]

## 2020-03-19 NOTE — PROGRESS NOTES
MCHC 31.5 03/19/2020    RDW 16.5 03/19/2020    PLT 74 03/19/2020    MPV 11.5 03/19/2020     BMP:    Lab Results   Component Value Date     03/19/2020    K 3.7 03/19/2020    K 4.4 03/18/2020     03/19/2020    CO2 24 03/19/2020    BUN 15 03/19/2020    LABALBU 3.5 03/19/2020    CREATININE 2.2 03/19/2020    CALCIUM 8.1 03/19/2020    GFRAA 27 03/19/2020    LABGLOM 22 03/19/2020    GLUCOSE 104 03/19/2020     Monitor :  Sinus with pauses occ runs of afib  CXR We reviewed the films during the inter-disciplinary rounds/conference, which showed lines and tubes ok and changes of COPD      Assessment and Plan   Julia Springer is a 68 y.o. female who was brought to the MICU for 1. Atrial fibrillation with RVR, 2. Unable to use [address] in the setting of GIB, 3. Hypertensive emergency --> uncontrolled 420V systolic, 4. KAYLA - nephrology following  --> ESRD on HD now  5. Hyperkalemia, 6. Acute respiratory failure on Bipap, 7. COPD with hx of tobacco use  8. Transaminitis, 9. Hx of ETOH abuse, 10. GIB - melanotic stools and positive FOBT. With negative EGD  IMPRESSION:  1. Atrial fibrillation with RVR -  Currently on cardizem gtt at 5 mg/hr  2. Unable to use [address] in the setting of GIB  3. Hypertensive emergency --> uncontrolled 514N systolic  4. KAYLA - nephrology following  --> ESRD on HD now  5. Hyperkalemia  6. Acute respiratory failure on Bipap  7. COPD with hx of tobacco use  8. Transaminitis  9. Hx of ETOH abuse  10. GIB - melanotic stools and positive FOBT.  Surgery following.      Acute metabolic encephalopathy of unclear etiology (resolved)  · Normal ammonia  · ABG negative for hypercapnia  · Elevated TSH with normal T4  · A a OX3 today  A. fib  · New onset  · Responds well to Lopressor  · Continue Lopressor every 6 hours  · Chads vas >2 but currently GI bleed  · We will hold anticoagulation at this time  · Diltiazem drip stopped due to sinus arrest  ·   Hypertensive emergency  · Initial /90  · Remains hypertensive  · Unable to give renal likely agents due to KAYLA  · Not responsive to loop diuretics  · Continue Lopressor every 6 hours with holding parameter for map <120  · Will receive HD today  ·   Heart failure  · History of alcoholism  · Possible dilated cardiomyopathy  · proBNP 49,000  · Follow-up complete echo  ·   Prolonged QTc   · Avoid QT prolonging meds     Pleural effusion most likely 2/2 CHF  · CXR initially concerning for possible pneumonia  · CT abdomen shows right-sided pleural effusion  ·   COPD stable  · COPD on home oxygen at 2 L  · Diffuse bilateral wheezing  · Started on Brovana, Pulmicort, duo nebs   · Wean OFF Solu-Medrol     Transaminitis 2/2 alcoholic hepatitis  · Initial  AST 1300   · Acetaminophen levels within normal limits  · Awaiting urine and serum drug screening  · CT abdomen unremarkable  · Follow-up CMV EBV DELANO  · Follow-up hepatitis panel  · Continue to trend LFTs  · CT abdomen unremarkable for acute intra-abdominal pathologies  · Follow-up CK  ·   Pancreatitis? · Currently n.p.o.   · Was on fluids but stopped due to possible CHF exacerbation  · Lipase 492: Downtrending  · Abdominal pain in the upper left quadrant  · History of alcoholism  ·   Upper GI bleed  · Melanotic stools  · FOBT positive  · General surgery consulted  · We will transfuse if hemoglobin <7  · Continue Protonix BID   · For EGD negative  ·   Alcoholism  · Folate B12 within normal limits  · Has had history of withdrawal in the past  · Given age [de-identified] not in place  · Will most likely need Precedex if patient becomes symptomatic use benzodiazepines sparingly     KAYLA most likely prerenal  · CR from approximately 5 days ago 1.2  · Initial CR 3.4  · Currently up trending  · Follow-up urine studies  · Minimal urine output   · Nephrology consulted  · Continue to monitor renal function  ·   Hyperkalemia 2/2 oral potassium, spironolactone and dehydration  · Hold home potassium and spironolactone  · Initial EKG showed peaked T waves  · Received calcium gluconate, insulin D50 albuterol and Kayexalate  · We will continue to monitor potassium  ·   High anion gap metabolic acidosis   · Normal lactic acid  · Minimal increase in serum osmolar gap       Normocytic anemia  · Low iron and iron saturation with normal ferritin  · Normal folate and B12  ·   NSVT   Defer to cardiology    Increase BB if able    Thrombocytopenia  · INR 1.4 APTT 27.5   · Follow up [address], DO, MPH  Professor of Internal Medicine

## 2020-03-19 NOTE — PROGRESS NOTES
[facility]  Department of Internal Medicine   MICU Progress Note    Patient:  Valdemar Kay 68 y.o. female   MRN: [id number]       Date of Service: 3/19/2020      Chief Complaint:  Fall and Bleeding     History of Present Illness      The patient has 64-year-old female with past medical history of COPD, hypertension, CKD, atrial fibrillation. The patient was transferred from the medical ICU yesterday (on 3/16/2020), is being treated for aspiration pneumonia, COPD exacerbation, hyperkalemia with KAYLA and CKD on hemodialysis, was found to have GI bleed, anticoagulation was stopped. On the same day (on 3/16/2020) RRT was called after the patient had a fall and bled from the side of her femoral line. The patient was found to be hypoxic (oxygen saturation dropped to 78%), and not able to protect her airway, a decision was made to intubate the patient and transferred her to the medical ICU for further management. I saw and examined the patient in the AM today. She is awake on fentanyl, following commands. She is sedated on 50 of fentanyl. Also running D5 normal at 40 mL/h. Vital signs have been stable overnight. The patient had a right IJ tunneled catheter placed on yesterday, she also had 1 unit transfused because her hemoglobin dropped. The patient had hemodialysis done yesterday was next removed 2 L. Patient had nonsustained ventricular tachycardia overnight. Past Medical History:  History reviewed. No pertinent past medical history. Past Surgical History:        Procedure Laterality Date    HC DIALYSIS CATHETER Right 3/18/2020    CATHETER INSERTION TUNNELED DIALYSIS performed by Chani Robertson MD at [address] [facility] N/A 3/15/2020    EGD DIAGNOSTIC ONLY performed by Syd Bond MD at [address]       Prior to Admission medications    Medication Sig Start Date End Date Taking?  Authorizing Provider   famotidine (PEPCID) 20 MG tablet Take 1 tablet by mouth daily tenderness/mass/nodules  · Lung: CTAB   · Heart: regular rate and rhythm, S1, S2 normal, no murmur, click, rub or gallop  · Abdomen: soft, non-tender; bowel sounds normal; no masses,  no organomegaly  · Extremities:  extremities normal, atraumatic, no cyanosis or edema  · Musculokeletal: No joint swelling   · Neurologic: Sedated and intubated    Labs     CBC:   Lab Results   Component Value Date    WBC 10.6 03/19/2020    RBC 2.65 03/19/2020    HGB 7.6 03/19/2020    HCT 24.1 03/19/2020    PLT 74 03/19/2020    MCV 90.9 03/19/2020     BMP:    Lab Results   Component Value Date     03/19/2020    K 3.7 03/19/2020    K 4.4 03/18/2020     03/19/2020    CO2 24 03/19/2020    BUN 15 03/19/2020    CREATININE 2.2 03/19/2020    GLUCOSE 104 03/19/2020    CALCIUM 8.1 03/19/2020     Hepatic Function Panel:    Lab Results   Component Value Date    ALKPHOS 50 03/19/2020    AST 31 03/19/2020     03/19/2020    PROT 5.1 03/19/2020    LABALBU 3.5 03/19/2020    BILITOT 1.0 03/19/2020     Cardiac Enzymes:   Lab Results   Component Value Date    TROPONINI 0.05 (H) 03/14/2020    TROPONINI 0.05 (H) 03/14/2020     PT/INR:    Lab Results   Component Value Date    PROTIME 14.2 03/16/2020    INR 1.3 03/16/2020     ABG:  No results found for: PHART, DBV9LFL, PO2ART, R3KTAIBP, PWD4RTQ, BEART  TSH:    Lab Results   Component Value Date    TSH 4.610 03/13/2020        Notable Cultures:       Culture  Date sent  Result    Nasal      Sputum      Body Fluid      Urine Strep pneumonia Ag        Urine Legionella Ag        Blood        Urine          Antibiotic  Days  Day started                                       Oxygen:   Nasal cannula L/min     Face mask %     Reservoirs mask %       ABG   Vent Settings     PH   Mode      PCO2   TV      PO2   RR      HCO3   PS      Sat%   PEEP      FIO2   FIO2        P/F          Lines:  Site  Day  Date inserted     TLC              PICC              Arterial line              Peripheral line DVT Prophylaxis      Heparin         Enoxaparin        PCDs        Imaging studies:   Imaging  Date  Result    CXR                       EKG: Rhythm Strip: normal sinus rhythm, unchanged from previous tracings. Resident's Assessment & Plan     Neurology    Sedated on fentanyl and versed. Acute Encephalopathy 2/2 Alcohol withdrawal    Unremrkable CT head, normal ammonia   Previous workup unremarkable. Mechanical Fall     Right hip x-ray not remarkable for fractures. Cardiovascular    Paroxysmal atrial fibrillation    CHADSsVASc score is 3, holding anticoagulation. Hemorrhagic shock secondary to blood losses from the site of femoral HD line (likely secondary to malfunctioning platelets due to uremia, compensated liver disease)    Started IV fluids (normal saline). Stable hemoglobin   Coagulation studies unremarkable. Upper Endoscopy unremarkable 3/15/2020)  Abnormal platelet function. Possible von Willebrand disease. Pulmonary    Acute hypoxic respiratory failure secondary to aspiration pneumonia    Continue controlling the source, on Unasyn  Plan for extubation today. Stop prednisone    Renal    KAYLA stage III on CKD (ischemic ATN). Nephrology is following, possible placement of either a temporary femoral HD catheter versus a   Right IJ tunneled catheter placed yesterday, patient and dialysis was 2 L removed on that yesterday. Infectious Disease    As above. Left Femoral atrial Thrombus. Venous ultrasound and arterial US are unremarkable. DVT/GI prophylaxis PCD/PPI         Namita Denis M.D. , PGY-1  Internal Medicine    Attending Physician: Dr. Kori Timmons personally saw, examined and provided care for the patient. Radiographs, labs and medication list were reviewed by me independently. Review of Residents documentation was conducted and revisions were made as appropriate.  I agree with the above documented exam, problem list and plan

## 2020-03-19 NOTE — FLOWSHEET NOTE
03/19/20 1745   Vital Signs   BP (!) 151/45   Temp 97.3 °F (36.3 °C)   Pulse 73   Resp 14   Weight 108 lb (49 kg)   Percent Weight Change 0   Pain Assessment   Pain Assessment CPOT   Pain Level 0   Post-Hemodialysis Assessment   Post-Treatment Procedures Blood returned;Catheter capped, clamped with Citrate x 2 ports   Machine Disinfection Process Acid/Vinegar Clean;Heat Disinfect; Exterior Machine Disinfection   Rinseback Volume (ml) 300 ml   Total Liters Processed (l/min) 0 l/min   Dialyzer Clearance Lightly streaked   Duration of Treatment (minutes) 120 minutes   Hemodialysis Output (ml) 1900 ml   NET Removed (ml) 1600 ml   Tolerated Treatment Good   Patient Response to Treatment tolerated well   Bilateral Breath Sounds Diminished;Rhonchi   Physician Notified?  No

## 2020-03-19 NOTE — PROGRESS NOTES
CONSTITUTIONAL:  awake, alerts to voice, intubated  NECK: no hematoma or appreciable ttp present  LUNGS:  vent  CARDIOVASCULAR:  regular rate and rhythm      LABS:    Lab Results   Component Value Date    WBC 10.6 03/19/2020    HGB 7.6 (L) 03/19/2020    HCT 24.1 (L) 03/19/2020    PLT 74 (L) 03/19/2020    PROTIME 14.2 (H) 03/16/2020    INR 1.3 03/16/2020    APTT 25.9 03/16/2020    K 3.7 03/19/2020    BUN 15 03/19/2020    CREATININE 2.2 (H) 03/19/2020       RADIOLOGY:

## 2020-03-20 ENCOUNTER — APPOINTMENT (OUTPATIENT)
Dept: GENERAL RADIOLOGY | Age: 70
DRG: 673 | End: 2020-03-20
Attending: INTERNAL MEDICINE
Payer: COMMERCIAL

## 2020-03-20 LAB
AADO2: 120.7 MMHG
ALBUMIN SERPL-MCNC: 2.9 G/DL (ref 3.5–5.2)
ALP BLD-CCNC: 52 U/L (ref 35–104)
ALT SERPL-CCNC: 89 U/L (ref 0–32)
ANION GAP SERPL CALCULATED.3IONS-SCNC: 20 MMOL/L (ref 7–16)
AST SERPL-CCNC: 27 U/L (ref 0–31)
B.E.: -0.8 MMOL/L (ref -3–3)
BASOPHILS ABSOLUTE: 0.01 E9/L (ref 0–0.2)
BASOPHILS RELATIVE PERCENT: 0.1 % (ref 0–2)
BILIRUB SERPL-MCNC: 0.6 MG/DL (ref 0–1.2)
BUN BLDV-MCNC: 25 MG/DL (ref 8–23)
CALCIUM SERPL-MCNC: 8.2 MG/DL (ref 8.6–10.2)
CHLORIDE BLD-SCNC: 99 MMOL/L (ref 98–107)
CO2: 20 MMOL/L (ref 22–29)
COHB: 0.7 % (ref 0–1.5)
CREAT SERPL-MCNC: 3.4 MG/DL (ref 0.5–1)
CRITICAL: ABNORMAL
DATE ANALYZED: ABNORMAL
DATE OF COLLECTION: ABNORMAL
EKG ATRIAL RATE: 105 BPM
EKG ATRIAL RATE: 127 BPM
EKG P AXIS: 62 DEGREES
EKG P-R INTERVAL: 182 MS
EKG Q-T INTERVAL: 306 MS
EKG Q-T INTERVAL: 398 MS
EKG QRS DURATION: 126 MS
EKG QRS DURATION: 134 MS
EKG QTC CALCULATION (BAZETT): 441 MS
EKG QTC CALCULATION (BAZETT): 526 MS
EKG R AXIS: 88 DEGREES
EKG R AXIS: 99 DEGREES
EKG T AXIS: -44 DEGREES
EKG T AXIS: 30 DEGREES
EKG VENTRICULAR RATE: 105 BPM
EKG VENTRICULAR RATE: 125 BPM
EOSINOPHILS ABSOLUTE: 0 E9/L (ref 0.05–0.5)
EOSINOPHILS RELATIVE PERCENT: 0 % (ref 0–6)
FIO2: 40 %
GFR AFRICAN AMERICAN: 16
GFR NON-AFRICAN AMERICAN: 13 ML/MIN/1.73
GLUCOSE BLD-MCNC: 87 MG/DL (ref 74–99)
HCO3: 22.9 MMOL/L (ref 22–26)
HCT VFR BLD CALC: 24.8 % (ref 34–48)
HCT VFR BLD CALC: 28.9 % (ref 34–48)
HEMOGLOBIN: 7.8 G/DL (ref 11.5–15.5)
HEMOGLOBIN: 9.2 G/DL (ref 11.5–15.5)
HHB: 1.8 % (ref 0–5)
IMMATURE GRANULOCYTES #: 0.15 E9/L
IMMATURE GRANULOCYTES %: 1.4 % (ref 0–5)
LAB: ABNORMAL
LACTIC ACID: 0.6 MMOL/L (ref 0.5–2.2)
LIPASE: 24 U/L (ref 13–60)
LYMPHOCYTES ABSOLUTE: 0.75 E9/L (ref 1.5–4)
LYMPHOCYTES RELATIVE PERCENT: 7.1 % (ref 20–42)
Lab: ABNORMAL
MAGNESIUM: 2 MG/DL (ref 1.6–2.6)
MCH RBC QN AUTO: 29.1 PG (ref 26–35)
MCHC RBC AUTO-ENTMCNC: 31.5 % (ref 32–34.5)
MCV RBC AUTO: 92.5 FL (ref 80–99.9)
METER GLUCOSE: 89 MG/DL (ref 74–99)
METER GLUCOSE: 89 MG/DL (ref 74–99)
METHB: 0.9 % (ref 0–1.5)
MODE: AC
MONOCYTES ABSOLUTE: 1.19 E9/L (ref 0.1–0.95)
MONOCYTES RELATIVE PERCENT: 11.2 % (ref 2–12)
NEUTROPHILS ABSOLUTE: 8.53 E9/L (ref 1.8–7.3)
NEUTROPHILS RELATIVE PERCENT: 80.2 % (ref 43–80)
O2 CONTENT: 12.2 ML/DL
O2 SATURATION: 98.2 % (ref 92–98.5)
O2HB: 96.6 % (ref 94–97)
OPERATOR ID: 2962
PATIENT TEMP: 37 C
PCO2: 34 MMHG (ref 35–45)
PDW BLD-RTO: 16.7 FL (ref 11.5–15)
PEEP/CPAP: 5 CMH2O
PFO2: 2.88 MMHG/%
PH BLOOD GAS: 7.45 (ref 7.35–7.45)
PHOSPHORUS: 4.3 MG/DL (ref 2.5–4.5)
PLATELET # BLD: 98 E9/L (ref 130–450)
PLATELET CONFIRMATION: NORMAL
PMV BLD AUTO: 11.1 FL (ref 7–12)
PO2: 115.4 MMHG (ref 75–100)
POTASSIUM SERPL-SCNC: 3.6 MMOL/L (ref 3.5–5)
PROCALCITONIN: 0.91 NG/ML (ref 0–0.08)
RBC # BLD: 2.68 E12/L (ref 3.5–5.5)
RI(T): 1.05
RR MECHANICAL: 14 B/MIN
SODIUM BLD-SCNC: 139 MMOL/L (ref 132–146)
SOURCE, BLOOD GAS: ABNORMAL
THB: 8.8 G/DL (ref 11.5–16.5)
TIME ANALYZED: 438
TOTAL PROTEIN: 4.8 G/DL (ref 6.4–8.3)
VT MECHANICAL: 400 ML
WBC # BLD: 10.6 E9/L (ref 4.5–11.5)

## 2020-03-20 PROCEDURE — 84145 PROCALCITONIN (PCT): CPT

## 2020-03-20 PROCEDURE — 94640 AIRWAY INHALATION TREATMENT: CPT

## 2020-03-20 PROCEDURE — 6360000002 HC RX W HCPCS: Performed by: STUDENT IN AN ORGANIZED HEALTH CARE EDUCATION/TRAINING PROGRAM

## 2020-03-20 PROCEDURE — 5A1D70Z PERFORMANCE OF URINARY FILTRATION, INTERMITTENT, LESS THAN 6 HOURS PER DAY: ICD-10-PCS | Performed by: INTERNAL MEDICINE

## 2020-03-20 PROCEDURE — 84100 ASSAY OF PHOSPHORUS: CPT

## 2020-03-20 PROCEDURE — 6370000000 HC RX 637 (ALT 250 FOR IP): Performed by: INTERNAL MEDICINE

## 2020-03-20 PROCEDURE — 82962 GLUCOSE BLOOD TEST: CPT

## 2020-03-20 PROCEDURE — 2580000003 HC RX 258: Performed by: INTERNAL MEDICINE

## 2020-03-20 PROCEDURE — 85018 HEMOGLOBIN: CPT

## 2020-03-20 PROCEDURE — C9113 INJ PANTOPRAZOLE SODIUM, VIA: HCPCS | Performed by: STUDENT IN AN ORGANIZED HEALTH CARE EDUCATION/TRAINING PROGRAM

## 2020-03-20 PROCEDURE — 85014 HEMATOCRIT: CPT

## 2020-03-20 PROCEDURE — 36415 COLL VENOUS BLD VENIPUNCTURE: CPT

## 2020-03-20 PROCEDURE — 2000000000 HC ICU R&B

## 2020-03-20 PROCEDURE — 71045 X-RAY EXAM CHEST 1 VIEW: CPT

## 2020-03-20 PROCEDURE — 90935 HEMODIALYSIS ONE EVALUATION: CPT | Performed by: INTERNAL MEDICINE

## 2020-03-20 PROCEDURE — 6370000000 HC RX 637 (ALT 250 FOR IP): Performed by: SURGERY

## 2020-03-20 PROCEDURE — 6360000002 HC RX W HCPCS: Performed by: SURGERY

## 2020-03-20 PROCEDURE — 99233 SBSQ HOSP IP/OBS HIGH 50: CPT | Performed by: INTERNAL MEDICINE

## 2020-03-20 PROCEDURE — 2580000003 HC RX 258: Performed by: SURGERY

## 2020-03-20 PROCEDURE — 6360000002 HC RX W HCPCS: Performed by: INTERNAL MEDICINE

## 2020-03-20 PROCEDURE — 85025 COMPLETE CBC W/AUTO DIFF WBC: CPT

## 2020-03-20 PROCEDURE — 94799 UNLISTED PULMONARY SVC/PX: CPT

## 2020-03-20 PROCEDURE — 83735 ASSAY OF MAGNESIUM: CPT

## 2020-03-20 PROCEDURE — 80053 COMPREHEN METABOLIC PANEL: CPT

## 2020-03-20 PROCEDURE — 94003 VENT MGMT INPAT SUBQ DAY: CPT

## 2020-03-20 PROCEDURE — 82805 BLOOD GASES W/O2 SATURATION: CPT

## 2020-03-20 PROCEDURE — 83690 ASSAY OF LIPASE: CPT

## 2020-03-20 PROCEDURE — 2500000003 HC RX 250 WO HCPCS: Performed by: INTERNAL MEDICINE

## 2020-03-20 PROCEDURE — 83605 ASSAY OF LACTIC ACID: CPT

## 2020-03-20 RX ADMIN — EPOETIN ALFA-EPBX 3000 UNITS: 3000 INJECTION, SOLUTION INTRAVENOUS; SUBCUTANEOUS at 09:11

## 2020-03-20 RX ADMIN — IPRATROPIUM BROMIDE AND ALBUTEROL SULFATE 1 AMPULE: 2.5; .5 SOLUTION RESPIRATORY (INHALATION) at 12:25

## 2020-03-20 RX ADMIN — BUDESONIDE 250 MCG: 0.25 SUSPENSION RESPIRATORY (INHALATION) at 08:15

## 2020-03-20 RX ADMIN — AMPICILLIN SODIUM AND SULBACTAM SODIUM 3 G: 2; 1 INJECTION, POWDER, FOR SOLUTION INTRAMUSCULAR; INTRAVENOUS at 03:00

## 2020-03-20 RX ADMIN — HYDRALAZINE HYDROCHLORIDE 50 MG: 50 TABLET, FILM COATED ORAL at 06:29

## 2020-03-20 RX ADMIN — PANTOPRAZOLE SODIUM 40 MG: 40 INJECTION, POWDER, FOR SOLUTION INTRAVENOUS at 09:08

## 2020-03-20 RX ADMIN — THIAMINE HYDROCHLORIDE 100 MG: 100 INJECTION, SOLUTION INTRAMUSCULAR; INTRAVENOUS at 09:09

## 2020-03-20 RX ADMIN — FOLIC ACID 1 MG: 5 INJECTION, SOLUTION INTRAMUSCULAR; INTRAVENOUS; SUBCUTANEOUS at 09:30

## 2020-03-20 RX ADMIN — CHLORHEXIDINE GLUCONATE 0.12% ORAL RINSE 15 ML: 1.2 LIQUID ORAL at 09:08

## 2020-03-20 RX ADMIN — HYDRALAZINE HYDROCHLORIDE 50 MG: 50 TABLET, FILM COATED ORAL at 12:38

## 2020-03-20 RX ADMIN — IPRATROPIUM BROMIDE AND ALBUTEROL SULFATE 1 AMPULE: 2.5; .5 SOLUTION RESPIRATORY (INHALATION) at 16:52

## 2020-03-20 RX ADMIN — AMLODIPINE BESYLATE 10 MG: 10 TABLET ORAL at 09:09

## 2020-03-20 RX ADMIN — MIDAZOLAM HYDROCHLORIDE 0.5 MG: 2 INJECTION, SOLUTION INTRAMUSCULAR; INTRAVENOUS at 03:11

## 2020-03-20 RX ADMIN — METOPROLOL TARTRATE 50 MG: 50 TABLET, FILM COATED ORAL at 09:09

## 2020-03-20 RX ADMIN — IPRATROPIUM BROMIDE AND ALBUTEROL SULFATE 1 AMPULE: 2.5; .5 SOLUTION RESPIRATORY (INHALATION) at 08:15

## 2020-03-20 RX ADMIN — Medication 10 ML: at 09:10

## 2020-03-20 ASSESSMENT — PAIN SCALES - GENERAL
PAINLEVEL_OUTOF10: 0

## 2020-03-20 ASSESSMENT — PULMONARY FUNCTION TESTS
PIF_VALUE: 22
PIF_VALUE: 21

## 2020-03-20 NOTE — PROGRESS NOTES
[address] [facility]  Department of Internal Medicine  Division of Pulmonary, Critical Care and Sleep Medicine  Progress Note      Patient:  Dharmesh Bauman 68 y.o. female  MRN: [id number]     Date of Service: 3/20/2020    Allergy: Patient has no known allergies. Subjective       She is awake propofol. HD removed 1.9 L yesterday, +5 L since admission, 30 ml in the last 24 hrs  Labs: HAGMA: renal failure Hb stable   EGD normal      Objective   I & O - 24hr:     Intake/Output Summary (Last 24 hours) at 3/20/2020 1659  Last data filed at 3/20/2020 1500  Gross per 24 hour   Intake 469 ml   Output 1940 ml   Net -1471 ml       Physical Exam:   · Vitals: BP (!) 126/47   Pulse 71   Temp 98.6 °F (37 °C)   Resp 12   Ht 5' 1\" (1.549 m)   Wt 99 lb 6.8 oz (45.1 kg)   SpO2 98%   BMI 18.79 kg/m²       Constitutional: Alert. Head: Normocephalic and atraumatic. Eyes: PERRL, (-) conjunctivitis, (-) scleral icterus. Mucus membranes moist.  Neck: (-)  swelling, (-) JVD   Respiratory: (+) bilateral  wheezes, (-)  rales, (-)  rhonchi. Cardiovascular: RRR. (-)  murmurs,   GI:  Abdomen soft, (-) tenderness (-) distension  (+) BS. (-)  rebound, (-) guarding, (-) rigidity. Extremities:  (-) clubbing, (-) cyanosis. 2+ distal pulses. (-) peripheral edema. Neurologic:  No focal neurological deficits.       Labs     CBC:   Lab Results   Component Value Date    WBC 10.6 03/20/2020    RBC 2.68 03/20/2020    HGB 9.2 03/20/2020    HCT 28.9 03/20/2020    MCV 92.5 03/20/2020    MCH 29.1 03/20/2020    MCHC 31.5 03/20/2020    RDW 16.7 03/20/2020    PLT 98 03/20/2020    MPV 11.1 03/20/2020     BMP:    Lab Results   Component Value Date     03/20/2020    K 3.6 03/20/2020    K 4.4 03/18/2020    CL 99 03/20/2020    CO2 20 03/20/2020    BUN 25 03/20/2020    LABALBU 2.9 03/20/2020    CREATININE 3.4 03/20/2020    CALCIUM 8.2 03/20/2020    GFRAA 16 03/20/2020    LABGLOM 13 renal  6. Hb stable   7.  Will most likely need Precedex if patient becomes symptomatic use benzodiazepines sparingly            Amandeep Mckeon DO, MPH  Professor of Internal Medicine

## 2020-03-20 NOTE — PROGRESS NOTES
Spontaneous Parameters performed on tubing compensation with oxygen     VT = 447 ml  f = 14  B/M  Ve = 6.7 L/M  NIF = -33  cmH2O  VC = 608 L  RSBI = 30    OET leak test performed: pt sxd orally oet and evac,  balloon deflated ;insp Vt 600 given and exh Vt 601 measured in return. No audible airflow heard orally. size 8 oet.  balloon reinflated results given to Dr Dany Gomez At rounds.       Performed by Amber Caputo

## 2020-03-20 NOTE — PROGRESS NOTES
[facility]  Department of Internal Medicine   MICU Progress Note    Patient:  Samuel Saravia 68 y.o. female   MRN: [id number]       Date of Service: 3/20/2020      Chief Complaint:  Fall and Bleeding     History of Present Illness      The patient has 68-year-old female with past medical history of COPD, hypertension, CKD, atrial fibrillation. The patient was transferred from the medical ICU yesterday (on 3/16/2020), is being treated for aspiration pneumonia, COPD exacerbation, hyperkalemia with KAYLA and CKD on hemodialysis, was found to have GI bleed, anticoagulation was stopped. On the same day (on 3/16/2020) RRT was called after the patient had a fall and bled from the side of her femoral line. The patient was found to be hypoxic (oxygen saturation dropped to 78%), and not able to protect her airway, a decision was made to intubate the patient and transferred her to the medical ICU for further management. I saw and examined the patient in the AM today. She is awake propofol. HD removed 1.9 L yesterday, +5 L since admission, 30 ml in the last 24 hrs  Labs: HAGMA: renal failure Hb stable   Hb slighlty dropping, CXR pending    Past Medical History:  History reviewed. No pertinent past medical history. Past Surgical History:        Procedure Laterality Date    HC DIALYSIS CATHETER Right 3/18/2020    CATHETER INSERTION TUNNELED DIALYSIS performed by Fabian Donaldson MD at [address] N/A 3/15/2020    EGD DIAGNOSTIC ONLY performed by Teetee Prakash MD at [address]       Prior to Admission medications    Medication Sig Start Date End Date Taking?  Authorizing Provider   famotidine (PEPCID) 20 MG tablet Take 1 tablet by mouth daily 3/16/20  Yes Jm Catalan MD   dilTIAZem (DILACOR XR) 180 MG extended release capsule Take 180 mg by mouth daily 2 CAP PO DAILY   Yes Historical Provider, MD   spironolactone (ALDACTONE) 25 MG tablet Take 25 mg by mouth daily HALF TAB   Yes Historical Provider, MD   triamcinolone (KENALOG) 0.025 % cream Apply topically 2 times daily. 9/3/19  Yes Jhonny Ward APRN - CNP   metoprolol succinate (TOPROL XL) 100 MG extended release tablet TAKE ONE TABLET BY MOUTH EVERY DAY 7/8/19  Yes Historical Provider, MD   VENTOLIN  (90 Base) MCG/ACT inhaler INHALE 2 PUFFS EVERY 4 HOURS AS NEEDED FOR SHORTNESS OF BREATH 7/8/19  Yes Historical Provider, MD   potassium citrate (UROCIT-K) 10 MEQ (1080 MG) extended release tablet Take by mouth 3 times daily (with meals)    Historical Provider, MD   potassium chloride (MICRO-K) 10 MEQ extended release capsule Take 20 mEq by mouth daily    Historical Provider, MD   predniSONE (DELTASONE) 20 MG tablet Take 20 mg by mouth daily    Historical Provider, MD       Allergies:  Patient has no known allergies. Social History:   TOBACCO:   reports that she has been smoking cigarettes. She has been smoking about 1.00 pack per day. She does not have any smokeless tobacco history on file. ETOH:   reports current alcohol use. OCCUPATION:      Family History:   No family history on file. REVIEW OF SYSTEMS:  Was not obtained due to the patient's condition. Physical Exam       Physical Exam:  · Vitals: BP (!) 144/44   Pulse 68   Temp 97.5 °F (36.4 °C) (Esophageal)   Resp 14   Ht 5' 1\" (1.549 m)   Wt 102 lb 3.2 oz (46.4 kg)   SpO2 100%   BMI 19.31 kg/m²     · I & O - 24hr: No intake/output data recorded. · General Appearance: Sedated and Intubated  · HEENT:  Head: Normocephalic, no lesions, without obvious abnormality.   · Neck: no adenopathy, no carotid bruit, no JVD, supple, symmetrical, trachea midline and thyroid not enlarged, symmetric, no tenderness/mass/nodules  · Lung: CTAB   · Heart: regular rate and rhythm, S1, S2 normal, no murmur, click, rub or gallop  · Abdomen: soft, non-tender; bowel sounds normal; no masses,  no organomegaly  · Extremities:  extremities normal, atraumatic, no cyanosis or edema  · Musculokeletal: No joint swelling   · Neurologic: Sedated and intubated    Labs     CBC:   Lab Results   Component Value Date    WBC 10.6 03/20/2020    RBC 2.68 03/20/2020    HGB 7.8 03/20/2020    HCT 24.8 03/20/2020    PLT 98 03/20/2020    MCV 92.5 03/20/2020     BMP:    Lab Results   Component Value Date     03/20/2020    K 3.6 03/20/2020    K 4.4 03/18/2020    CL 99 03/20/2020    CO2 20 03/20/2020    BUN 25 03/20/2020    CREATININE 3.4 03/20/2020    GLUCOSE 87 03/20/2020    CALCIUM 8.2 03/20/2020     Hepatic Function Panel:    Lab Results   Component Value Date    ALKPHOS 52 03/20/2020    AST 27 03/20/2020    ALT 89 03/20/2020    PROT 4.8 03/20/2020    LABALBU 2.9 03/20/2020    BILITOT 0.6 03/20/2020     Cardiac Enzymes:   Lab Results   Component Value Date    TROPONINI 0.05 (H) 03/14/2020    TROPONINI 0.05 (H) 03/14/2020     PT/INR:    Lab Results   Component Value Date    PROTIME 14.2 03/16/2020    INR 1.3 03/16/2020     ABG:  No results found for: PHART, QLG6GFV, PO2ART, G8JCOCON, GAE5WEX, BEART  TSH:    Lab Results   Component Value Date    TSH 4.610 03/13/2020        Notable Cultures:       Culture  Date sent  Result    Nasal      Sputum      Body Fluid      Urine Strep pneumonia Ag        Urine Legionella Ag        Blood        Urine          Antibiotic  Days  Day started                                       Oxygen:   Nasal cannula L/min     Face mask %     Reservoirs mask %       ABG   Vent Settings     PH   Mode      PCO2   TV      PO2   RR      HCO3   PS      Sat%   PEEP      FIO2   FIO2        P/F          Lines:  Site  Day  Date inserted     TLC              PICC              Arterial line              Peripheral line                           DVT Prophylaxis      Heparin         Enoxaparin        PCDs        Imaging studies:   Imaging  Date  Result    CXR                       EKG: Rhythm Strip: normal sinus rhythm, unchanged from previous tracings.     Resident's Assessment & Plan Neurology    Sedated on fentanyl and versed. Acute Encephalopathy 2/2 Alcohol withdrawal    Unremrkable CT head, normal ammonia   Previous workup unremarkable. Mechanical Fall     Right hip x-ray not remarkable for fractures. Cardiovascular    Paroxysmal atrial fibrillation    CHADSsVASc score is 3, holding anticoagulation. Hemorrhagic shock secondary to blood losses from the site of femoral HD line (likely secondary to malfunctioning platelets due to uremia, compensated liver disease)    Started IV fluids (normal saline). Stable hemoglobin   Coagulation studies unremarkable. Upper Endoscopy unremarkable 3/15/2020)  Abnormal platelet function. Possible von Willebrand disease. Pulmonary    Acute hypoxic respiratory failure secondary to aspiration pneumonia    Continue controlling the source, on Unasyn  Plan for extubation today. Stop prednisone    Renal    KAYLA stage III on CKD (ischemic ATN). Nephrology is following, possible placement of either a temporary femoral HD catheter versus a   Right IJ tunneled catheter placed  patient and dialysis was 1.9 removed on that yesterday. Infectious Disease    As above. Left Femoral atrial Thrombus. Venous ultrasound and arterial US are unremarkable. DVT/GI prophylaxis PCD/PPI         Boy Vega M.D. , PGY-1  Internal Medicine    Attending Physician: Dr. Martell Gonzalez personally saw, examined and provided care for the patient. Radiographs, labs and medication list were reviewed by me independently. Review of Residents documentation was conducted and revisions were made as appropriate. I agree with the above documented exam, problem list and plan of care. Assessment:     Tunneled HD placed. 1 unit pRBC. Removed some fluid yesterday.     1. Acute hypoxic resp failure  2. Aspiration pna  3. ETOH withdrawl  4. KAYLA on HD  5.  Hemorrhagic shock  From fem site - resolve  6. p.afib           Possible extubation  Add home BP meds and prns      CCT excluding procedures 35 minutes    Electronically signed by Dinora Sanchez DO on 3/20/2020 at 9:44 PM

## 2020-03-20 NOTE — PROGRESS NOTES
Patient was extubated to 4 liters/min via nasal cannula. Breath Sounds post extubation were clear diminished bilaterally. Stridor was not present post extubation. SPO2 was 100%.       Performed by  Raymond Pickens

## 2020-03-20 NOTE — PROGRESS NOTES
discontinued  Ongoing hemodialysis  Status post Tessio  3/18/2020   BUN/creatinine remains improved with ongoing dialysis    Accelerated hypertension-i  improved on current regimen  Transfuse if hemoglobin falls below 7-currently 7.6    ETOH withdrawls leading to AMS ?   Ammonia level   Ativan as needed for withdrawls     DVT Prophylaxis   PT/OT  continue ICU level care     All consultants notes reviewed    Kisha Ludwig MD  3:15 PM  3/20/2020

## 2020-03-20 NOTE — FLOWSHEET NOTE
03/20/20 1728   Vital Signs   BP (!) 141/52   Temp 98.4 °F (36.9 °C)   Pulse 81   Resp 20   SpO2 100 %   Height 5' 1\" (1.549 m)   Weight 95 lb 0.3 oz (43.1 kg)   Weight Method Bed scale   Percent Weight Change -4.43   Pain Assessment   Pain Assessment 0-10   Pain Level 0   Post-Hemodialysis Assessment   Post-Treatment Procedures Blood returned;Catheter capped, clamped with Citrate x 2 ports   Machine Disinfection Process Acid/Vinegar Clean;Exterior Machine Disinfection; Heat Disinfect; Machine Absence of Bleach Machine   Rinseback Volume (ml) 300 ml   Total Liters Processed (l/min) 90.4 l/min   Dialyzer Clearance Lightly streaked   Duration of Treatment (minutes) 240 minutes   Hemodialysis Intake (ml) 300 ml   Hemodialysis Output (ml) 2300 ml   NET Removed (ml) 2000 ml   Tolerated Treatment Good   Patient Response to Treatment dialysis completed, pt blood returned, cath care given, both ports of cath flushed c 10 cc ns, citrate instilled to fill volume, caps applied, pt tolerated well, report given to Lenny   Bilateral Breath Sounds Diminished   Physician Notified?  No

## 2020-03-20 NOTE — PROGRESS NOTES
Department of Internal Medicine  Nephrology Attending Progress Note    Events reviewed.   Has a new right IJ tunneled dialysis catheter placed today    She remains oliguric    SUBJECTIVE: We are following Mrs. Twila Galeas for KAYLA needing dialysis  Remains anuric  She is now extubated on 4 L of oxygen via nasal cannula  I have seen and examined her on dialysis today      PHYSICAL EXAM:      Vitals:    VITALS:  BP (!) 126/47   Pulse 72   Temp 98.6 °F (37 °C)   Resp 9   Ht 5' 1\" (1.549 m)   Wt 99 lb 6.8 oz (45.1 kg)   SpO2 (!) 80%   BMI 18.79 kg/m²   24HR INTAKE/OUTPUT:      Intake/Output Summary (Last 24 hours) at 3/20/2020 1511  Last data filed at 3/20/2020 0830  Gross per 24 hour   Intake --   Output 1925 ml   Net -1925 ml     Access: Right IJ tunneled dialysis catheter delivering adequate blood flow rate  Constitutional: Patient is extubated and awake with an FiO2 requirement of 4 L via nasal cannula  HEENT: Pupils are equal reactive, mucous membranes moist no  Respiratory: fine rales in upper lobes, no wheezing  Cardiovascular/Edema: S1 and S2 present  Gastrointestinal: Abdomen soft, pierre with concentrated urine  Neurologic: Nonfocal  Skin: No lesions  Other: No edema    Scheduled Meds:   metoprolol tartrate  50 mg Per NG tube BID    sodium chloride flush  10 mL Intravenous 2 times per day    [Held by provider] heparin (porcine)  5,000 Units Subcutaneous 3 times per day    pantoprazole  40 mg Intravenous Daily    chlorhexidine  15 mL Mouth/Throat BID    folic acid  1 mg Intravenous Daily    thiamine  100 mg Intravenous Daily    epoetin pavan-epbx  3,000 Units Subcutaneous Once per day on Mon Wed Fri    [Held by provider] metoprolol succinate  100 mg Oral Daily    hydrALAZINE  50 mg Oral 3 times per day    amLODIPine  10 mg Oral Daily    ampicillin-sulbactam  3 g Intravenous Q24H    ferrous sulfate  325 mg Oral BID WC    ipratropium-albuterol  1 ampule Inhalation Q4H WA    budesonide  250 mcg

## 2020-03-20 NOTE — PLAN OF CARE
Problem: Restraint Use - Nonviolent/Non-Self-Destructive Behavior:  Goal: Absence of restraint-related injury  Description: Absence of restraint-related injury  Outcome: Met This Shift     Problem: Falls - Risk of:  Goal: Will remain free from falls  Description: Will remain free from falls  Outcome: Met This Shift     Problem: Restraint Use - Nonviolent/Non-Self-Destructive Behavior:  Goal: Absence of restraint indications  Description: Absence of restraint indications  Outcome: Not Met This Shift

## 2020-03-21 ENCOUNTER — APPOINTMENT (OUTPATIENT)
Dept: GENERAL RADIOLOGY | Age: 70
DRG: 673 | End: 2020-03-21
Attending: INTERNAL MEDICINE
Payer: COMMERCIAL

## 2020-03-21 LAB
ALBUMIN SERPL-MCNC: 2.9 G/DL (ref 3.5–5.2)
ALP BLD-CCNC: 67 U/L (ref 35–104)
ALT SERPL-CCNC: 78 U/L (ref 0–32)
ANION GAP SERPL CALCULATED.3IONS-SCNC: 13 MMOL/L (ref 7–16)
AST SERPL-CCNC: 24 U/L (ref 0–31)
BASOPHILS ABSOLUTE: 0.01 E9/L (ref 0–0.2)
BASOPHILS RELATIVE PERCENT: 0.1 % (ref 0–2)
BILIRUB SERPL-MCNC: 0.6 MG/DL (ref 0–1.2)
BLOOD BANK DISPENSE STATUS: NORMAL
BLOOD BANK PRODUCT CODE: NORMAL
BPU ID: NORMAL
BUN BLDV-MCNC: 8 MG/DL (ref 8–23)
CALCIUM SERPL-MCNC: 8 MG/DL (ref 8.6–10.2)
CHLORIDE BLD-SCNC: 103 MMOL/L (ref 98–107)
CO2: 27 MMOL/L (ref 22–29)
CREAT SERPL-MCNC: 2.1 MG/DL (ref 0.5–1)
DESCRIPTION BLOOD BANK: NORMAL
EOSINOPHILS ABSOLUTE: 0 E9/L (ref 0.05–0.5)
EOSINOPHILS RELATIVE PERCENT: 0 % (ref 0–6)
GFR AFRICAN AMERICAN: 28
GFR NON-AFRICAN AMERICAN: 23 ML/MIN/1.73
GLUCOSE BLD-MCNC: 97 MG/DL (ref 74–99)
HCT VFR BLD CALC: 26.1 % (ref 34–48)
HCT VFR BLD CALC: 26.9 % (ref 34–48)
HEMOGLOBIN: 8.2 G/DL (ref 11.5–15.5)
HEMOGLOBIN: 8.5 G/DL (ref 11.5–15.5)
IMMATURE GRANULOCYTES #: 0.15 E9/L
IMMATURE GRANULOCYTES %: 1.5 % (ref 0–5)
LIPASE: 66 U/L (ref 13–60)
LYMPHOCYTES ABSOLUTE: 0.9 E9/L (ref 1.5–4)
LYMPHOCYTES RELATIVE PERCENT: 8.8 % (ref 20–42)
MAGNESIUM: 1.8 MG/DL (ref 1.6–2.6)
MCH RBC QN AUTO: 29.6 PG (ref 26–35)
MCHC RBC AUTO-ENTMCNC: 31.6 % (ref 32–34.5)
MCV RBC AUTO: 93.7 FL (ref 80–99.9)
METER GLUCOSE: 102 MG/DL (ref 74–99)
MONOCYTES ABSOLUTE: 1.25 E9/L (ref 0.1–0.95)
MONOCYTES RELATIVE PERCENT: 12.2 % (ref 2–12)
NEUTROPHILS ABSOLUTE: 7.91 E9/L (ref 1.8–7.3)
NEUTROPHILS RELATIVE PERCENT: 77.4 % (ref 43–80)
PDW BLD-RTO: 15.9 FL (ref 11.5–15)
PHOSPHORUS: 2.3 MG/DL (ref 2.5–4.5)
PLATELET # BLD: 145 E9/L (ref 130–450)
PMV BLD AUTO: 11.1 FL (ref 7–12)
POTASSIUM SERPL-SCNC: 3.9 MMOL/L (ref 3.5–5)
RBC # BLD: 2.87 E12/L (ref 3.5–5.5)
SODIUM BLD-SCNC: 143 MMOL/L (ref 132–146)
TOTAL PROTEIN: 5 G/DL (ref 6.4–8.3)
WBC # BLD: 10.2 E9/L (ref 4.5–11.5)

## 2020-03-21 PROCEDURE — 94640 AIRWAY INHALATION TREATMENT: CPT

## 2020-03-21 PROCEDURE — 6370000000 HC RX 637 (ALT 250 FOR IP): Performed by: INTERNAL MEDICINE

## 2020-03-21 PROCEDURE — 80053 COMPREHEN METABOLIC PANEL: CPT

## 2020-03-21 PROCEDURE — 71045 X-RAY EXAM CHEST 1 VIEW: CPT

## 2020-03-21 PROCEDURE — 36415 COLL VENOUS BLD VENIPUNCTURE: CPT

## 2020-03-21 PROCEDURE — 85018 HEMOGLOBIN: CPT

## 2020-03-21 PROCEDURE — 84100 ASSAY OF PHOSPHORUS: CPT

## 2020-03-21 PROCEDURE — 6360000002 HC RX W HCPCS: Performed by: INTERNAL MEDICINE

## 2020-03-21 PROCEDURE — 85014 HEMATOCRIT: CPT

## 2020-03-21 PROCEDURE — 6370000000 HC RX 637 (ALT 250 FOR IP): Performed by: SURGERY

## 2020-03-21 PROCEDURE — 2500000003 HC RX 250 WO HCPCS: Performed by: INTERNAL MEDICINE

## 2020-03-21 PROCEDURE — 2700000000 HC OXYGEN THERAPY PER DAY

## 2020-03-21 PROCEDURE — C9113 INJ PANTOPRAZOLE SODIUM, VIA: HCPCS | Performed by: STUDENT IN AN ORGANIZED HEALTH CARE EDUCATION/TRAINING PROGRAM

## 2020-03-21 PROCEDURE — 6360000002 HC RX W HCPCS: Performed by: SURGERY

## 2020-03-21 PROCEDURE — 6360000002 HC RX W HCPCS: Performed by: STUDENT IN AN ORGANIZED HEALTH CARE EDUCATION/TRAINING PROGRAM

## 2020-03-21 PROCEDURE — 2580000003 HC RX 258: Performed by: STUDENT IN AN ORGANIZED HEALTH CARE EDUCATION/TRAINING PROGRAM

## 2020-03-21 PROCEDURE — 2580000003 HC RX 258: Performed by: INTERNAL MEDICINE

## 2020-03-21 PROCEDURE — 83735 ASSAY OF MAGNESIUM: CPT

## 2020-03-21 PROCEDURE — 85025 COMPLETE CBC W/AUTO DIFF WBC: CPT

## 2020-03-21 PROCEDURE — 83690 ASSAY OF LIPASE: CPT

## 2020-03-21 PROCEDURE — 99232 SBSQ HOSP IP/OBS MODERATE 35: CPT | Performed by: INTERNAL MEDICINE

## 2020-03-21 PROCEDURE — 82962 GLUCOSE BLOOD TEST: CPT

## 2020-03-21 PROCEDURE — 2140000000 HC CCU INTERMEDIATE R&B

## 2020-03-21 RX ORDER — HYDRALAZINE HYDROCHLORIDE 20 MG/ML
10 INJECTION INTRAMUSCULAR; INTRAVENOUS EVERY 6 HOURS PRN
Status: DISCONTINUED | OUTPATIENT
Start: 2020-03-21 | End: 2020-03-26 | Stop reason: HOSPADM

## 2020-03-21 RX ORDER — ALBUTEROL SULFATE 90 UG/1
2 AEROSOL, METERED RESPIRATORY (INHALATION) EVERY 6 HOURS
Status: DISCONTINUED | OUTPATIENT
Start: 2020-03-21 | End: 2020-03-26 | Stop reason: HOSPADM

## 2020-03-21 RX ORDER — HYDRALAZINE HYDROCHLORIDE 25 MG/1
75 TABLET, FILM COATED ORAL EVERY 8 HOURS SCHEDULED
Status: DISCONTINUED | OUTPATIENT
Start: 2020-03-21 | End: 2020-03-26 | Stop reason: HOSPADM

## 2020-03-21 RX ADMIN — PANTOPRAZOLE SODIUM 40 MG: 40 INJECTION, POWDER, FOR SOLUTION INTRAVENOUS at 08:26

## 2020-03-21 RX ADMIN — AMLODIPINE BESYLATE 10 MG: 10 TABLET ORAL at 08:26

## 2020-03-21 RX ADMIN — POTASSIUM & SODIUM PHOSPHATES POWDER PACK 280-160-250 MG 250 MG: 280-160-250 PACK at 21:37

## 2020-03-21 RX ADMIN — IPRATROPIUM BROMIDE 2 PUFF: 17 AEROSOL, METERED RESPIRATORY (INHALATION) at 14:28

## 2020-03-21 RX ADMIN — Medication 10 ML: at 08:26

## 2020-03-21 RX ADMIN — FOLIC ACID 1 MG: 5 INJECTION, SOLUTION INTRAMUSCULAR; INTRAVENOUS; SUBCUTANEOUS at 08:26

## 2020-03-21 RX ADMIN — LABETALOL HYDROCHLORIDE 10 MG: 5 INJECTION, SOLUTION INTRAVENOUS at 04:21

## 2020-03-21 RX ADMIN — BUDESONIDE 250 MCG: 0.25 SUSPENSION RESPIRATORY (INHALATION) at 08:26

## 2020-03-21 RX ADMIN — THIAMINE HYDROCHLORIDE 100 MG: 100 INJECTION, SOLUTION INTRAMUSCULAR; INTRAVENOUS at 08:26

## 2020-03-21 RX ADMIN — HYDRALAZINE HYDROCHLORIDE 75 MG: 25 TABLET, FILM COATED ORAL at 21:29

## 2020-03-21 RX ADMIN — ALBUTEROL SULFATE 2 PUFF: 90 AEROSOL, METERED RESPIRATORY (INHALATION) at 14:28

## 2020-03-21 RX ADMIN — AMPICILLIN SODIUM AND SULBACTAM SODIUM 3 G: 2; 1 INJECTION, POWDER, FOR SOLUTION INTRAMUSCULAR; INTRAVENOUS at 04:21

## 2020-03-21 RX ADMIN — FERROUS SULFATE TAB 325 MG (65 MG ELEMENTAL FE) 325 MG: 325 (65 FE) TAB at 08:26

## 2020-03-21 RX ADMIN — HYDRALAZINE HYDROCHLORIDE 10 MG: 20 INJECTION INTRAMUSCULAR; INTRAVENOUS at 23:53

## 2020-03-21 RX ADMIN — POTASSIUM & SODIUM PHOSPHATES POWDER PACK 280-160-250 MG 250 MG: 280-160-250 PACK at 14:28

## 2020-03-21 RX ADMIN — IPRATROPIUM BROMIDE AND ALBUTEROL SULFATE 1 AMPULE: 2.5; .5 SOLUTION RESPIRATORY (INHALATION) at 08:26

## 2020-03-21 RX ADMIN — HYDRALAZINE HYDROCHLORIDE 75 MG: 25 TABLET, FILM COATED ORAL at 14:28

## 2020-03-21 RX ADMIN — METOPROLOL TARTRATE 50 MG: 50 TABLET, FILM COATED ORAL at 08:26

## 2020-03-21 ASSESSMENT — PAIN SCALES - GENERAL
PAINLEVEL_OUTOF10: 0
PAINLEVEL_OUTOF10: 0

## 2020-03-21 NOTE — PROGRESS NOTES
Subjective:    Out of ICU  Resting comfortably in no apparent acute distress      Objective:    BP (!) 178/73   Pulse 73   Temp 98.6 °F (37 °C) (Temporal)   Resp 18   Ht 5' 1\" (1.549 m)   Wt 105 lb 12.8 oz (48 kg)   SpO2 98%   BMI 19.99 kg/m²     In: 974 [I.V.:674]  Out: 2310     HEENT: Intubated NCAT,  PERRLA, No JVD  Heart: Irregular no murmurs, gallops, or rubs. Lungs:  CTA bilaterally, no wheeze, rales or rhonchi  Abd: bowel sounds present, nontender, nondistended, no masses  Extrem:  No clubbing, cyanosis, or edema     Recent Labs     03/19/20  0450  03/20/20  0425 03/20/20  1520 03/21/20  0010 03/21/20  0515   WBC 10.6  --  10.6  --   --  10.2   HGB 7.6*   < > 7.8* 9.2* 8.2* 8.5*   HCT 24.1*   < > 24.8* 28.9* 26.1* 26.9*   PLT 74*  --  98*  --   --  145    < > = values in this interval not displayed.        Recent Labs     03/19/20  0450 03/20/20  0425 03/21/20  0515    139 143   K 3.7 3.6 3.9    99 103   CO2 24 20* 27   BUN 15 25* 8   CREATININE 2.2* 3.4* 2.1*   CALCIUM 8.1* 8.2* 8.0*       Assessment:    Patient Active Problem List   Diagnosis    Hyperkalemia    Atrial fibrillation (HCC)    Acute renal insufficiency    Chronic heart failure with preserved ejection fraction (HFpEF) ([facility])    Acute and chr resp failure, unsp w hypoxia or hypercapnia (HCC)    Essential hypertension       Plan:    Admit to medical intensive care unit for multiple issues  Transferred out of icu 3/16- RRT called for AMS and pt pulling out HD catheter - profuse bleeding with hypotension - transferred back to icu and intubated for airway protection- await extubation today as patient is alert   Status post hemodialysis catheter placement     Acute anemia after pulling out HD catheter   Status post EGD-grossly normal,   serial H&H's-stable ~8.5  S/p  placement of tessio  General surgery following     Hyperkalemia with acute renal failure  Offending agents in the form of Aldactone/oral potassium supplements discontinued  Ongoing hemodialysis  Status post Tessio  3/18/2020   BUN/creatinine remains improved with ongoing dialysis    Accelerated hypertension-  improved on current regimen  Transfuse if hemoglobin falls below 7-currently 7.6  Resume home medications of metoprolol based on blood pressures-increase dose back to 100 twice daily with parameters  PRN hydralazine    ETOH withdrawls leading to AMS ?   Ammonia level   Ativan as needed for withdrawls     DVT Prophylaxis   PT/OT      All consultants notes reviewed    Anibal Fu MD  12:16 PM  3/21/2020

## 2020-03-21 NOTE — PLAN OF CARE
Problem: Restraint Use - Nonviolent/Non-Self-Destructive Behavior:  Goal: Absence of restraint indications  Description: Absence of restraint indications  3/21/2020 1104 by Messi Adkins RN  Outcome: Met This Shift     Problem: Falls - Risk of:  Goal: Will remain free from falls  Description: Will remain free from falls  Outcome: Met This Shift     Problem: Falls - Risk of:  Goal: Absence of physical injury  Description: Absence of physical injury  Outcome: Met This Shift

## 2020-03-21 NOTE — PROGRESS NOTES
[address] [facility]   Department of Internal Medicine   Internal Medicine Residency  MICU Progress Note    Patient:  Terri De Leon 68 y.o. female   MRN: [id number]       Date of Service: 3/21/2020    Allergy: Patient has no known allergies. Subjective     Patient was seen and examined in AM. Patient states feels better. Denies fever, CP, SOB, chills, and N/V. No complaints. 24 hour change: Stable overnight. No acute overnight issues reported. Objective     TEMPERATURE:  Current - Temp: 98.9 °F (37.2 °C); Max - Temp  Av.6 °F (37 °C)  Min: 98.2 °F (36.8 °C)  Max: 98.9 °F (37.2 °C)  RESPIRATIONS RANGE: Resp  Av.6  Min: 9  Max: 23  PULSE RANGE: Pulse  Av.3  Min: 66  Max: 99  BLOOD PRESSURE RANGE:  Systolic (73DQO), BXE:191 , Min:126 , TKI:546   ; Diastolic (78LIP), INDU:54, Min:44, Max:63    PULSE OXIMETRY RANGE: SpO2  Av %  Min: 80 %  Max: 100 %    I & O - 24hr:    Intake/Output Summary (Last 24 hours) at 3/21/2020 0535  Last data filed at 3/20/2020 1728  Gross per 24 hour   Intake 769 ml   Output 2320 ml   Net -1551 ml     I/O last 3 completed shifts: In: 769 [I.V.:469]  Out: 6751 [Urine:35] No intake/output data recorded. Weight change: -8 lb 9.2 oz (-3.888 kg)    Physical Exam:  General Appearance:    Alert, cooperative, no acute distress. HEENT:    NC/AT, mucous membranes are moist   Neck:   Supple, no jugular venous distention. Resp:     CTAB, No wheezes, No rhonchi, no use of accessory muscles   Heart:    RRR, S1 and S2 normal, no murmur, rub or gallop.     Abdomen:     Soft, non-tender, non-distended with normal bowel sounds   Extremities:   Atraumatic, no cyanosis or edema   Pulses:  Radial and pedal pulses are intact bilaterally   Neurologic:   AAOx3, No focal motor deficit       Medications     Continuous Infusions:   propofol Stopped (20 0850)    dextrose 5 % and 0.9 % NaCl 40 mL/hr at 20 1505    dextrose       Scheduled Meds:   metoprolol tartrate  50 line              HD cath            Urethral Catheter-Output (mL): 15 mL    Imaging Studies:    EKG:   Resident's Assessment and Plan      Jazmin Garcia is 68 y.o. female with past medical history of COPD, hypertension, CKD, atrial fibrillation. The patient was transferred from the medical ICU yesterday (on 3/16/2020), is being treated for aspiration pneumonia, COPD exacerbation, hyperkalemia with KAYLA and CKD on hemodialysis, was found to have GI bleed, anticoagulation was stopped. On the same day (on 3/16/2020) RRT was called after the patient had a fall and bled from the side of her femoral line. The patient was found to be hypoxic (oxygen saturation dropped to 78%), and not able to protect her airway, a decision was made to intubate the patient and transferred her to the medical ICU for further management. Neurology  AAOx3  Following commands     Acute Encephalopathy 2/2 Alcohol withdrawal  Unremrkable CT head, normal ammonia   Previous workup unremarkable.      Mechanical Fall    Right hip x-ray not remarkable for fractures.     Cardiovascular     Paroxysmal atrial fibrillation  CHADSsVASc score is 3, holding anticoagulation. Hemorrhagic shock secondary to blood losses from the site of femoral HD line (likely secondary to malfunctioning platelets due to uremia, compensated liver disease)  Stable hemoglobin   Coagulation studies unremarkable. Upper Endoscopy unremarkable 3/15/2020)  Abnormal platelet function.   Possible von Willebrand disease.        Pulmonary     Acute hypoxic respiratory failure secondary to aspiration pneumonia  Continue controlling the source, on Unasyn  On nasal canula 2 L  off prednisone     Renal     KAYLA stage III on CKD (ischemic ATN).   Nephrology is following, possible placement of either a temporary femoral HD catheter versus a   Right IJ tunneled catheter placed  patient and dialysis was 1.9 removed   Creatinine improving     Infectious Disease  As above.      Left Femoral

## 2020-03-21 NOTE — PROGRESS NOTES
Telesitter placed in room, explained to patient the purpose of telesitter and that restraints will be discontinued, verbalized understanding.  Restraints removed     Gail Lamas RN

## 2020-03-21 NOTE — PROGRESS NOTES
Department of Internal Medicine  Nephrology Attending Progress Note    Events reviewed.   BP is very high    SUBJECTIVE: We are following Mrs. Antonio Torres for KAYLA needing dialysis  Remains anuric        PHYSICAL EXAM:      Vitals:    VITALS:  BP (!) 139/52   Pulse 85   Temp 98.9 °F (37.2 °C)   Resp 19   Ht 5' 1\" (1.549 m)   Wt 102 lb 6.4 oz (46.4 kg)   SpO2 100%   BMI 19.35 kg/m²   24HR INTAKE/OUTPUT:      Intake/Output Summary (Last 24 hours) at 3/21/2020 1018  Last data filed at 3/21/2020 0500  Gross per 24 hour   Intake 1443 ml   Output 2325 ml   Net -882 ml     Access: Right IJ tunneled dialysis catheter   Constitutional: Patient is extubated and awake with an FiO2 requirement of 3 L via nasal cannula  HEENT: Pupils are equal reactive, mucous membranes moist no  Respiratory: fine rales in upper lobes, no wheezing  Cardiovascular/Edema: S1 and S2 present  Gastrointestinal: Abdomen soft  Neurologic: Nonfocal  Skin: No lesions  Other: No edema    Scheduled Meds:   metoprolol tartrate  50 mg Per NG tube BID    sodium chloride flush  10 mL Intravenous 2 times per day    [Held by provider] heparin (porcine)  5,000 Units Subcutaneous 3 times per day    pantoprazole  40 mg Intravenous Daily    folic acid  1 mg Intravenous Daily    thiamine  100 mg Intravenous Daily    epoetin pavan-epbx  3,000 Units Subcutaneous Once per day on Mon Wed Fri    [Held by provider] metoprolol succinate  100 mg Oral Daily    hydrALAZINE  50 mg Oral 3 times per day    amLODIPine  10 mg Oral Daily    ampicillin-sulbactam  3 g Intravenous Q24H    ferrous sulfate  325 mg Oral BID WC    ipratropium-albuterol  1 ampule Inhalation Q4H WA    budesonide  250 mcg Nebulization BID    dextrose  25 g Intravenous Once     Continuous Infusions:   propofol Stopped (03/20/20 0850)    dextrose 5 % and 0.9 % NaCl 40 mL/hr at 03/18/20 1505    dextrose       PRN Meds:.labetalol, midazolam, sodium chloride flush, acetaminophen **OR** March 13, 2020 transferred from [facility] where she initially presented with left leg weakness and was found to have creatinine level of 3.4 m/dL and a potassium level >7 mEq/L. In view of the lack of vascular surgeon available this week and the consideration of need of dialysis with possible need of dialysis catheter placement patient was transferred to this hospital.  Prior to admission patient reports having nausea vomiting and diarrhea. She denies taking NSAIDs. Her medications prior to admission included spironolactone and potassium supplementation. Problems resolved:  · Hyperkalemia, multifactorial due to KAYLA, potassium intake and spironolactone. Potassium levels improved. IMPRESSION/RECOMMENDATIONS:      1. KAYLA stage III, established ischemic ATN, Started on RRT, 3/15. Remains anuric, for HD x 4 hours today. 2. High anion gap metabolic acidosis (renal failure), improving  3. Hypocalcemia 2/2  vitamin D deficiency, to start ergocalciferol  4. HTN, uncontrolled  5. New onset AF, with RVR, on metoprolol, diltiazem drip, unable to anticoagulate ? GIB  6. Right lung pneumonia, procalcitonin 0.70, on ampicillin-sulbactam  7. Shock liver, LFTs continue to improve  8. Thrombocytopenia  9. COPD, methylprednisolone  10.  Anemia, +FOBT, s/p EGD which was normal      Plan:    Labs and CXR results reviewed  No need for dialysis today  Add K phos 1 pack TID for 6 doses  Increase Hydralazine to 75 mg PO TID  Continue with Epogen 3000 units subcu 3 times a week

## 2020-03-22 LAB
ALBUMIN SERPL-MCNC: 2.8 G/DL (ref 3.5–5.2)
ALP BLD-CCNC: 69 U/L (ref 35–104)
ALT SERPL-CCNC: 63 U/L (ref 0–32)
ANION GAP SERPL CALCULATED.3IONS-SCNC: 15 MMOL/L (ref 7–16)
AST SERPL-CCNC: 18 U/L (ref 0–31)
B.E.: 1.5 MMOL/L (ref -3–3)
BASOPHILS ABSOLUTE: 0.01 E9/L (ref 0–0.2)
BASOPHILS RELATIVE PERCENT: 0.1 % (ref 0–2)
BILIRUB SERPL-MCNC: 0.5 MG/DL (ref 0–1.2)
BUN BLDV-MCNC: 13 MG/DL (ref 8–23)
CALCIUM SERPL-MCNC: 8 MG/DL (ref 8.6–10.2)
CHLORIDE BLD-SCNC: 101 MMOL/L (ref 98–107)
CO2: 25 MMOL/L (ref 22–29)
COHB: 0.4 % (ref 0–1.5)
CREAT SERPL-MCNC: 3.4 MG/DL (ref 0.5–1)
CRITICAL: ABNORMAL
DATE ANALYZED: ABNORMAL
DATE OF COLLECTION: ABNORMAL
EOSINOPHILS ABSOLUTE: 0 E9/L (ref 0.05–0.5)
EOSINOPHILS RELATIVE PERCENT: 0 % (ref 0–6)
GFR AFRICAN AMERICAN: 16
GFR NON-AFRICAN AMERICAN: 13 ML/MIN/1.73
GLUCOSE BLD-MCNC: 97 MG/DL (ref 74–99)
HCO3: 25.2 MMOL/L (ref 22–26)
HCT VFR BLD CALC: 28.4 % (ref 34–48)
HEMOGLOBIN: 8.5 G/DL (ref 11.5–15.5)
HHB: 4.3 % (ref 0–5)
IMMATURE GRANULOCYTES #: 0.18 E9/L
IMMATURE GRANULOCYTES %: 1.8 % (ref 0–5)
LAB: ABNORMAL
LYMPHOCYTES ABSOLUTE: 0.93 E9/L (ref 1.5–4)
LYMPHOCYTES RELATIVE PERCENT: 9.4 % (ref 20–42)
Lab: ABNORMAL
MAGNESIUM: 1.6 MG/DL (ref 1.6–2.6)
MCH RBC QN AUTO: 28.3 PG (ref 26–35)
MCHC RBC AUTO-ENTMCNC: 29.9 % (ref 32–34.5)
MCV RBC AUTO: 94.7 FL (ref 80–99.9)
METHB: 0.3 % (ref 0–1.5)
MODE: ABNORMAL
MONOCYTES ABSOLUTE: 1.09 E9/L (ref 0.1–0.95)
MONOCYTES RELATIVE PERCENT: 11 % (ref 2–12)
NEUTROPHILS ABSOLUTE: 7.73 E9/L (ref 1.8–7.3)
NEUTROPHILS RELATIVE PERCENT: 77.7 % (ref 43–80)
O2 CONTENT: 13.3 ML/DL
O2 SATURATION: 95.7 % (ref 92–98.5)
O2HB: 95 % (ref 94–97)
OPERATOR ID: 2156
PATIENT TEMP: 37 C
PCO2: 36.2 MMHG (ref 35–45)
PDW BLD-RTO: 15.6 FL (ref 11.5–15)
PH BLOOD GAS: 7.46 (ref 7.35–7.45)
PHOSPHORUS: 3.1 MG/DL (ref 2.5–4.5)
PLATELET # BLD: 190 E9/L (ref 130–450)
PMV BLD AUTO: 10.5 FL (ref 7–12)
PO2: 76.2 MMHG (ref 75–100)
POTASSIUM SERPL-SCNC: 3.6 MMOL/L (ref 3.5–5)
PRO-BNP: ABNORMAL PG/ML (ref 0–125)
RBC # BLD: 3 E12/L (ref 3.5–5.5)
SODIUM BLD-SCNC: 141 MMOL/L (ref 132–146)
SOURCE, BLOOD GAS: ABNORMAL
THB: 9.9 G/DL (ref 11.5–16.5)
TIME ANALYZED: 1348
TOTAL PROTEIN: 5 G/DL (ref 6.4–8.3)
WBC # BLD: 9.9 E9/L (ref 4.5–11.5)

## 2020-03-22 PROCEDURE — 83735 ASSAY OF MAGNESIUM: CPT

## 2020-03-22 PROCEDURE — 80053 COMPREHEN METABOLIC PANEL: CPT

## 2020-03-22 PROCEDURE — 6370000000 HC RX 637 (ALT 250 FOR IP): Performed by: INTERNAL MEDICINE

## 2020-03-22 PROCEDURE — 36415 COLL VENOUS BLD VENIPUNCTURE: CPT

## 2020-03-22 PROCEDURE — 2580000003 HC RX 258: Performed by: INTERNAL MEDICINE

## 2020-03-22 PROCEDURE — 2140000000 HC CCU INTERMEDIATE R&B

## 2020-03-22 PROCEDURE — 83880 ASSAY OF NATRIURETIC PEPTIDE: CPT

## 2020-03-22 PROCEDURE — 84100 ASSAY OF PHOSPHORUS: CPT

## 2020-03-22 PROCEDURE — 2700000000 HC OXYGEN THERAPY PER DAY

## 2020-03-22 PROCEDURE — 94660 CPAP INITIATION&MGMT: CPT

## 2020-03-22 PROCEDURE — 6360000002 HC RX W HCPCS: Performed by: INTERNAL MEDICINE

## 2020-03-22 PROCEDURE — 85025 COMPLETE CBC W/AUTO DIFF WBC: CPT

## 2020-03-22 PROCEDURE — 82805 BLOOD GASES W/O2 SATURATION: CPT

## 2020-03-22 PROCEDURE — 99291 CRITICAL CARE FIRST HOUR: CPT | Performed by: INTERNAL MEDICINE

## 2020-03-22 RX ORDER — MAGNESIUM SULFATE 1 G/100ML
1 INJECTION INTRAVENOUS ONCE
Status: COMPLETED | OUTPATIENT
Start: 2020-03-22 | End: 2020-03-22

## 2020-03-22 RX ADMIN — DEXTROSE AND SODIUM CHLORIDE: 5; 900 INJECTION, SOLUTION INTRAVENOUS at 10:51

## 2020-03-22 RX ADMIN — IPRATROPIUM BROMIDE 2 PUFF: 17 AEROSOL, METERED RESPIRATORY (INHALATION) at 06:10

## 2020-03-22 RX ADMIN — MAGNESIUM SULFATE 1 G: 1 INJECTION INTRAVENOUS at 13:56

## 2020-03-22 RX ADMIN — IPRATROPIUM BROMIDE 2 PUFF: 17 AEROSOL, METERED RESPIRATORY (INHALATION) at 19:21

## 2020-03-22 RX ADMIN — AMPICILLIN SODIUM AND SULBACTAM SODIUM 3 G: 2; 1 INJECTION, POWDER, FOR SOLUTION INTRAMUSCULAR; INTRAVENOUS at 02:25

## 2020-03-22 RX ADMIN — ALBUTEROL SULFATE 2 PUFF: 90 AEROSOL, METERED RESPIRATORY (INHALATION) at 06:10

## 2020-03-22 RX ADMIN — POTASSIUM & SODIUM PHOSPHATES POWDER PACK 280-160-250 MG 250 MG: 280-160-250 PACK at 21:44

## 2020-03-22 RX ADMIN — POTASSIUM & SODIUM PHOSPHATES POWDER PACK 280-160-250 MG 250 MG: 280-160-250 PACK at 10:26

## 2020-03-22 RX ADMIN — IPRATROPIUM BROMIDE 2 PUFF: 17 AEROSOL, METERED RESPIRATORY (INHALATION) at 13:53

## 2020-03-22 RX ADMIN — POTASSIUM & SODIUM PHOSPHATES POWDER PACK 280-160-250 MG 250 MG: 280-160-250 PACK at 14:00

## 2020-03-22 RX ADMIN — FERROUS SULFATE TAB 325 MG (65 MG ELEMENTAL FE) 325 MG: 325 (65 FE) TAB at 18:00

## 2020-03-22 RX ADMIN — ALBUTEROL SULFATE 2 PUFF: 90 AEROSOL, METERED RESPIRATORY (INHALATION) at 13:26

## 2020-03-22 RX ADMIN — Medication 10 ML: at 10:26

## 2020-03-22 RX ADMIN — FERROUS SULFATE TAB 325 MG (65 MG ELEMENTAL FE) 325 MG: 325 (65 FE) TAB at 10:19

## 2020-03-22 RX ADMIN — ALBUTEROL SULFATE 2 PUFF: 90 AEROSOL, METERED RESPIRATORY (INHALATION) at 19:19

## 2020-03-22 RX ADMIN — METOPROLOL SUCCINATE 100 MG: 100 TABLET, FILM COATED, EXTENDED RELEASE ORAL at 10:18

## 2020-03-22 ASSESSMENT — PAIN SCALES - GENERAL
PAINLEVEL_OUTOF10: 0
PAINLEVEL_OUTOF10: 0

## 2020-03-22 NOTE — PROGRESS NOTES
[address] [facility]  Department of Internal Medicine  Division of Pulmonary, Critical Care and Sleep Medicine  Progress Note      Patient:  Tereso Gibson 68 y.o. female  MRN: [id number]     Date of Service: 3/22/2020    Allergy: Patient has no known allergies. Subjective     Extubated and on medical floor   EGD normal  Looks weak, VOlume overloaded -  States has home oxygen but doesn't use it much  On 4 liters  Rhonchi and rales everywhere      Objective   I & O - 24hr:     Intake/Output Summary (Last 24 hours) at 3/22/2020 1310  Last data filed at 3/22/2020 9515  Gross per 24 hour   Intake 1552 ml   Output 50 ml   Net 1502 ml       Physical Exam:   · Vitals: /72   Pulse 85   Temp 98.5 °F (36.9 °C) (Oral)   Resp 20   Ht 5' 1\" (1.549 m)   Wt 102 lb (46.3 kg)   SpO2 95%   BMI 19.27 kg/m²       Constitutional: Alert. Head: Normocephalic and atraumatic. Eyes: PERRL, (-) conjunctivitis, (-) scleral icterus. Mucus membranes moist.  Neck: (-)  swelling, (-) JVD   Respiratory: (+) bilateral  wheezes, (+)  rales, (+)  rhonchi. Cardiovascular: RRR. (-)  murmurs,   GI:  Abdomen soft, (-) tenderness (-) distension  (+) BS. (-)  rebound, (-) guarding, (-) rigidity. Extremities:  (-) clubbing, (-) cyanosis. 2+ distal pulses. (-) peripheral edema. Neurologic:  No focal neurological deficits.       Labs     CBC:   Lab Results   Component Value Date    WBC 9.9 03/22/2020    RBC 3.00 03/22/2020    HGB 8.5 03/22/2020    HCT 28.4 03/22/2020    MCV 94.7 03/22/2020    MCH 28.3 03/22/2020    MCHC 29.9 03/22/2020    RDW 15.6 03/22/2020     03/22/2020    MPV 10.5 03/22/2020     BMP:    Lab Results   Component Value Date     03/22/2020    K 3.6 03/22/2020    K 4.4 03/18/2020     03/22/2020    CO2 25 03/22/2020    BUN 13 03/22/2020    LABALBU 2.8 03/22/2020    CREATININE 3.4 03/22/2020    CALCIUM 8.0 03/22/2020    GFRAA 16 03/22/2020 LABGLOM 13 03/22/2020    GLUCOSE 97 03/22/2020     Monitor :  Sinus with pauses occ runs of afib  CXR We reviewed the films during the inter-disciplinary rounds/conference, which showed lines and tubes ok and changes of COPD    ECHOCARDIOGRAM:   Normal left ventricular size and systolic function. Ejection fraction is visually estimated at 55-60%. Grade II diastolic dysfunction. No regional wall motion abnormalities seen. Mild left ventricular concentric hypertrophy noted. Abnormal LV septal motion consistent with conduction disorder. Normal right ventricular size and function. The left atrium is mildly dilated. No significant valvular abnormalities. Assessment and Plan   Dharmesh Bauman is a 68 y.o. female who was brought to the MICU for 1. Atrial fibrillation with RVR, 2. Unable to use [address] in the setting of GIB, 3. Hypertensive emergency --> uncontrolled 000T systolic, 4. KAYLA - nephrology following  --> ESRD on HD now5. Hyperkalemia, 6. Acute respiratory failure on Bipap, 7. COPD with hx of tobacco use 8. Transaminitis, 9. Hx of ETOH abuse, 10. GIB - melanotic stools and positive FOBTw th negative EGD    IMPRESSION:  1. Acute metabolic encephalopathy of multiple etiology (resolved)  2. Atrial fibrillation with RVR -  Currently on cardizem gtt at 5 mg/hr  3. Unable to use [address] in the setting of GIB  4. Hypertensive emergency --> uncontrolled 287T systolic  5. KAYLA - nephrology following  --> ESRD on HD now  6. Hyperkalemia   7. COPD with hx of tobacco use  8. Transaminitis - Hx of ETOH abuse  9. GIB - melanotic stools and positive FOBT. EGD negative      10. Elevated BNP Heart failure HFpEF DD  11. Prolonged QTc Avoid QT prolonging meds  12. Pleural effusion most likely 2/2 CHF  13. Alcoholism  14. KAYLA- ATN on HD  15. Normocytic anemia - many reason  16. Alcoholic Has had history of withdrawal in the past    PLAN:  1. Need HD  2. Check CXR and ABG  3. AVAPS ordered  4.  PT OT  5. COrrect Mg, K ,

## 2020-03-22 NOTE — PROGRESS NOTES
citrate, glucagon (rDNA), glucose, dextrose, glucagon (rDNA), dextrose, perflutren lipid microspheres    DATA:    CBC:   Lab Results   Component Value Date    WBC 9.9 03/22/2020    RBC 3.00 03/22/2020    HGB 8.5 03/22/2020    HCT 28.4 03/22/2020    MCV 94.7 03/22/2020    MCH 28.3 03/22/2020    MCHC 29.9 03/22/2020    RDW 15.6 03/22/2020     03/22/2020    MPV 10.5 03/22/2020     CMP:    Lab Results   Component Value Date     03/22/2020    K 3.6 03/22/2020    K 4.4 03/18/2020     03/22/2020    CO2 25 03/22/2020    BUN 13 03/22/2020    CREATININE 3.4 03/22/2020    GFRAA 16 03/22/2020    LABGLOM 13 03/22/2020    GLUCOSE 97 03/22/2020    PROT 5.0 03/22/2020    LABALBU 2.8 03/22/2020    CALCIUM 8.0 03/22/2020    BILITOT 0.5 03/22/2020    ALKPHOS 69 03/22/2020    AST 18 03/22/2020    ALT 63 03/22/2020     Magnesium:    Lab Results   Component Value Date    MG 1.6 03/22/2020     Phosphorus:    Lab Results   Component Value Date    PHOS 3.1 03/22/2020     Radiology Review:      CT Abdomen and pelvis 3/14/20   1. No acute inflammatory changes omental mesenteric fat planes free   intraperitoneal air, bowel obstruction or ascites. .       2. Fluid contents in the colon can indicate impending diarrhea-like   condition. Chest x-ray March 13, 2020   Tortuous ectatic aorta   Cardiomegaly   Airspace disease compatible with pneumonia, at the right lung base                         Results for Mary Em (MRN [id number]) as of 3/17/2020 13:31   Ref.  Range 3/14/2020 06:00   Ferritin Latest Units: ng/mL 1,036   Iron Latest Ref Range: 37 - 145 mcg/dL 36 (L)   Iron Saturation Latest Ref Range: 15 - 50 % 13 (L)   TIBC Latest Ref Range: 250 - 450 mcg/dL 269       BRIEF SUMMARY OF INITIAL CONSULT:    Briefly Mrs. Lashaun De Jesus is a 68-year-old woman with history of HTN, COPD, alcohol abuse, who was admitted on March 13, 2020 transferred from [facility] where she initially presented with left leg weakness and was

## 2020-03-23 LAB
ALBUMIN SERPL-MCNC: 2.7 G/DL (ref 3.5–5.2)
ALP BLD-CCNC: 75 U/L (ref 35–104)
ALT SERPL-CCNC: 53 U/L (ref 0–32)
ANION GAP SERPL CALCULATED.3IONS-SCNC: 15 MMOL/L (ref 7–16)
AST SERPL-CCNC: 18 U/L (ref 0–31)
BASOPHILS ABSOLUTE: 0.02 E9/L (ref 0–0.2)
BASOPHILS RELATIVE PERCENT: 0.2 % (ref 0–2)
BILIRUB SERPL-MCNC: 0.4 MG/DL (ref 0–1.2)
BUN BLDV-MCNC: 17 MG/DL (ref 8–23)
CALCIUM SERPL-MCNC: 8.1 MG/DL (ref 8.6–10.2)
CHLORIDE BLD-SCNC: 101 MMOL/L (ref 98–107)
CO2: 23 MMOL/L (ref 22–29)
CREAT SERPL-MCNC: 4.4 MG/DL (ref 0.5–1)
EOSINOPHILS ABSOLUTE: 0 E9/L (ref 0.05–0.5)
EOSINOPHILS RELATIVE PERCENT: 0 % (ref 0–6)
GFR AFRICAN AMERICAN: 12
GFR NON-AFRICAN AMERICAN: 10 ML/MIN/1.73
GLUCOSE BLD-MCNC: 83 MG/DL (ref 74–99)
HCT VFR BLD CALC: 27.3 % (ref 34–48)
HEMOGLOBIN: 8.3 G/DL (ref 11.5–15.5)
IMMATURE GRANULOCYTES #: 0.19 E9/L
IMMATURE GRANULOCYTES %: 1.9 % (ref 0–5)
LYMPHOCYTES ABSOLUTE: 1.06 E9/L (ref 1.5–4)
LYMPHOCYTES RELATIVE PERCENT: 10.4 % (ref 20–42)
MAGNESIUM: 1.8 MG/DL (ref 1.6–2.6)
MCH RBC QN AUTO: 28.5 PG (ref 26–35)
MCHC RBC AUTO-ENTMCNC: 30.4 % (ref 32–34.5)
MCV RBC AUTO: 93.8 FL (ref 80–99.9)
METER GLUCOSE: 116 MG/DL (ref 74–99)
METER GLUCOSE: 94 MG/DL (ref 74–99)
MONOCYTES ABSOLUTE: 1.01 E9/L (ref 0.1–0.95)
MONOCYTES RELATIVE PERCENT: 10 % (ref 2–12)
NEUTROPHILS ABSOLUTE: 7.87 E9/L (ref 1.8–7.3)
NEUTROPHILS RELATIVE PERCENT: 77.5 % (ref 43–80)
PDW BLD-RTO: 14.8 FL (ref 11.5–15)
PHOSPHORUS: 3.7 MG/DL (ref 2.5–4.5)
PLATELET # BLD: 192 E9/L (ref 130–450)
PMV BLD AUTO: 10.7 FL (ref 7–12)
POTASSIUM SERPL-SCNC: 3.7 MMOL/L (ref 3.5–5)
RBC # BLD: 2.91 E12/L (ref 3.5–5.5)
SODIUM BLD-SCNC: 139 MMOL/L (ref 132–146)
TOTAL PROTEIN: 5 G/DL (ref 6.4–8.3)
WBC # BLD: 10.2 E9/L (ref 4.5–11.5)

## 2020-03-23 PROCEDURE — 6360000002 HC RX W HCPCS: Performed by: INTERNAL MEDICINE

## 2020-03-23 PROCEDURE — 6370000000 HC RX 637 (ALT 250 FOR IP): Performed by: INTERNAL MEDICINE

## 2020-03-23 PROCEDURE — 82962 GLUCOSE BLOOD TEST: CPT

## 2020-03-23 PROCEDURE — 2500000003 HC RX 250 WO HCPCS: Performed by: INTERNAL MEDICINE

## 2020-03-23 PROCEDURE — 36415 COLL VENOUS BLD VENIPUNCTURE: CPT

## 2020-03-23 PROCEDURE — 84100 ASSAY OF PHOSPHORUS: CPT

## 2020-03-23 PROCEDURE — 2580000003 HC RX 258: Performed by: INTERNAL MEDICINE

## 2020-03-23 PROCEDURE — 80053 COMPREHEN METABOLIC PANEL: CPT

## 2020-03-23 PROCEDURE — 90935 HEMODIALYSIS ONE EVALUATION: CPT | Performed by: INTERNAL MEDICINE

## 2020-03-23 PROCEDURE — 97535 SELF CARE MNGMENT TRAINING: CPT

## 2020-03-23 PROCEDURE — 83735 ASSAY OF MAGNESIUM: CPT

## 2020-03-23 PROCEDURE — 99232 SBSQ HOSP IP/OBS MODERATE 35: CPT | Performed by: INTERNAL MEDICINE

## 2020-03-23 PROCEDURE — 97168 OT RE-EVAL EST PLAN CARE: CPT

## 2020-03-23 PROCEDURE — 97164 PT RE-EVAL EST PLAN CARE: CPT

## 2020-03-23 PROCEDURE — 2140000000 HC CCU INTERMEDIATE R&B

## 2020-03-23 PROCEDURE — 94660 CPAP INITIATION&MGMT: CPT

## 2020-03-23 PROCEDURE — 97530 THERAPEUTIC ACTIVITIES: CPT

## 2020-03-23 PROCEDURE — 85025 COMPLETE CBC W/AUTO DIFF WBC: CPT

## 2020-03-23 PROCEDURE — 2700000000 HC OXYGEN THERAPY PER DAY

## 2020-03-23 PROCEDURE — 5A1D70Z PERFORMANCE OF URINARY FILTRATION, INTERMITTENT, LESS THAN 6 HOURS PER DAY: ICD-10-PCS | Performed by: INTERNAL MEDICINE

## 2020-03-23 RX ADMIN — IPRATROPIUM BROMIDE 2 PUFF: 17 AEROSOL, METERED RESPIRATORY (INHALATION) at 01:19

## 2020-03-23 RX ADMIN — AMPICILLIN SODIUM AND SULBACTAM SODIUM 3 G: 2; 1 INJECTION, POWDER, FOR SOLUTION INTRAMUSCULAR; INTRAVENOUS at 01:18

## 2020-03-23 RX ADMIN — FERROUS SULFATE TAB 325 MG (65 MG ELEMENTAL FE) 325 MG: 325 (65 FE) TAB at 10:00

## 2020-03-23 RX ADMIN — LABETALOL HYDROCHLORIDE 10 MG: 5 INJECTION, SOLUTION INTRAVENOUS at 11:59

## 2020-03-23 RX ADMIN — ALBUTEROL SULFATE 2 PUFF: 90 AEROSOL, METERED RESPIRATORY (INHALATION) at 20:30

## 2020-03-23 RX ADMIN — ALBUTEROL SULFATE 2 PUFF: 90 AEROSOL, METERED RESPIRATORY (INHALATION) at 12:01

## 2020-03-23 RX ADMIN — HYDRALAZINE HYDROCHLORIDE 75 MG: 25 TABLET, FILM COATED ORAL at 05:37

## 2020-03-23 RX ADMIN — ALBUTEROL SULFATE 2 PUFF: 90 AEROSOL, METERED RESPIRATORY (INHALATION) at 01:19

## 2020-03-23 RX ADMIN — IPRATROPIUM BROMIDE 2 PUFF: 17 AEROSOL, METERED RESPIRATORY (INHALATION) at 20:30

## 2020-03-23 RX ADMIN — Medication 10 ML: at 22:14

## 2020-03-23 RX ADMIN — FERROUS SULFATE TAB 325 MG (65 MG ELEMENTAL FE) 325 MG: 325 (65 FE) TAB at 17:01

## 2020-03-23 RX ADMIN — METOPROLOL SUCCINATE 100 MG: 100 TABLET, FILM COATED, EXTENDED RELEASE ORAL at 10:01

## 2020-03-23 RX ADMIN — HYDRALAZINE HYDROCHLORIDE 75 MG: 25 TABLET, FILM COATED ORAL at 15:38

## 2020-03-23 RX ADMIN — EPOETIN ALFA-EPBX 3000 UNITS: 3000 INJECTION, SOLUTION INTRAVENOUS; SUBCUTANEOUS at 10:00

## 2020-03-23 RX ADMIN — Medication 10 ML: at 10:02

## 2020-03-23 RX ADMIN — IPRATROPIUM BROMIDE 2 PUFF: 17 AEROSOL, METERED RESPIRATORY (INHALATION) at 12:02

## 2020-03-23 ASSESSMENT — PAIN SCALES - GENERAL
PAINLEVEL_OUTOF10: 0

## 2020-03-23 NOTE — DISCHARGE INSTR - COC
Continuity of Care Form    Patient Name: Darion Juan   :  1950  MRN:  [id number]    Admit date:  3/13/2020  Discharge date:  3/26    Code Status Order: [facility]   Advance Directives:   [facility] Documentation     Date/Time Healthcare Directive Type of Healthcare Directive Copy in [facility] [address] Agent's Name Healthcare Agent's Phone Number    [phone]  No, patient does not have an advance directive for healthcare treatment -- -- -- -- --          Admitting Physician:  Lor Moore MD  PCP: No primary care provider on file. Discharging Nurse: Victor Hugo  Discharging Hospital Unit/Room#: 4588/2133-C  Discharging Unit Phone Number: [phone]    Emergency Contact:   Extended Emergency Contact Information  Primary Emergency Contact: Osmani Schofield [facility]  Home Phone: [phone]  Relation: Child  Secondary Emergency Contact: Nataliia Kumar Phone: [phone]  Relation: Domestic Partner    Past Surgical History:  Past Surgical History:   Procedure Laterality Date    [facility]. DIALYSIS CATHETER Right 3/18/2020    CATHETER INSERTION TUNNELED DIALYSIS performed by Gabby Jeong MD at [address] N/A 3/15/2020    EGD DIAGNOSTIC ONLY performed by Marc Saha MD at [facility] ENDOSCOPY       Immunization History: There is no immunization history on file for this patient.     Active Problems:  Patient Active Problem List   Diagnosis Code    Hyperkalemia E87.5    Atrial fibrillation (HCC) I48.91    Acute renal insufficiency N28.9    Chronic heart failure with preserved ejection fraction (HFpEF) (HCC) I50.32    Acute and chr resp failure, unsp w hypoxia or hypercapnia (HCC) J96.20    Essential hypertension I10       Isolation/Infection:   Isolation          No Isolation        Patient Infection Status     None to display          Nurse Assessment:  Last Vital Signs: BP (!) 185/97   Pulse 96   Temp 98.1 °F (36.7 °C)   Resp 20   Ht Modified Barium Swallow with Video (Video Swallowing Test): not done    Treatments at the Time of Hospital Discharge:   Respiratory Treatments: ***  Oxygen Therapy:  is on oxygen at 2 L/min per nasal cannula. Ventilator:    - No ventilator support    Rehab Therapies: PT and OT to eval and treat   Weight Bearing Status/Restrictions: No weight bearing restirctions  Other Medical Equipment (for information only, NOT a DME order):  walker  Other Treatments: ***    Patient's personal belongings (please select all that are sent with patient):  Glasses    RN SIGNATURE:  Electronically signed by Gogo Walsh RN on 3/26/20 at 11:48 AM EDT    CASE MANAGEMENT/SOCIAL WORK SECTION    Inpatient Status Date: ***    Readmission Risk Assessment Score:  Readmission Risk              Risk of Unplanned Readmission:        22           Discharging to Facility/ Agency   · Name: evin   · Address:  · Phone:  · Fax:    Dialysis Facility (if applicable)   · Name:  [facility]   · [address] , [place] [facility] [address]  · Dialysis Schedule: tue, thurs, sat at 12:20 pm. , first treatment there will start 3/26/20  Thursday and must be there at 11:50 p.m. · Phone: [phone]  · Fax: [phone]    / signature: Electronically signed by JOSE Watson on 3/23/2020 at 9:10 AM    PHYSICIAN SECTION    Prognosis: Fair    Condition at Discharge: Stable    Rehab Potential (if transferring to Rehab): Fair    Recommended Labs or Other Treatments After Discharge: cbc and bmp in 3 days     Physician Certification: I certify the above information and transfer of [address]  is necessary for the continuing treatment of the diagnosis listed and that she requires [facility] for less 30 days.      Update Admission H&P: No change in H&P    PHYSICIAN SIGNATURE:  Electronically signed by Sandra Ivy MD on 3/26/20 at 10:23 AM EDT

## 2020-03-23 NOTE — PROGRESS NOTES
[address] [facility]  Department of Internal Medicine  Division of Pulmonary, Critical Care and Sleep Medicine  Progress Note      Patient:  Dom Lea 68 y.o. female  MRN: [id number]     Date of Service: 3/23/2020    Allergy: Patient has no known allergies. Subjective     EGD normal  States has home oxygen but doesn't use it much  On 2 liters    Objective   I & O - 24hr:     Intake/Output Summary (Last 24 hours) at 3/23/2020 1326  Last data filed at 3/23/2020 1131  Gross per 24 hour   Intake 640 ml   Output 3080 ml   Net -2440 ml       Physical Exam:   · Vitals: BP (!) 211/99   Pulse 78   Temp 99.1 °F (37.3 °C) (Temporal)   Resp 22   Ht 5' 1\" (1.549 m)   Wt 100 lb 12 oz (45.7 kg)   SpO2 98%   BMI 19.04 kg/m²       Constitutional: Alert. Head: Normocephalic and atraumatic. Eyes: PERRL, (-) conjunctivitis, (-) scleral icterus. Mucus membranes moist.  Neck: (-)  swelling, (-) JVD   Respiratory: (+) bilateral  wheezes, (+)  rales, (+)  rhonchi. Cardiovascular: RRR. (-)  murmurs,   GI:  Abdomen soft, (-) tenderness (-) distension  (+) BS. (-)  rebound, (-) guarding, (-) rigidity. Extremities:  (-) clubbing, (-) cyanosis. 2+ distal pulses. (-) peripheral edema. Neurologic:  No focal neurological deficits.       Labs     CBC:   Lab Results   Component Value Date    WBC 10.2 03/23/2020    RBC 2.91 03/23/2020    HGB 8.3 03/23/2020    HCT 27.3 03/23/2020    MCV 93.8 03/23/2020    MCH 28.5 03/23/2020    MCHC 30.4 03/23/2020    RDW 14.8 03/23/2020     03/23/2020    MPV 10.7 03/23/2020     BMP:    Lab Results   Component Value Date     03/23/2020    K 3.7 03/23/2020    K 4.4 03/18/2020     03/23/2020    CO2 23 03/23/2020    BUN 17 03/23/2020    LABALBU 2.7 03/23/2020    CREATININE 4.4 03/23/2020    CALCIUM 8.1 03/23/2020    GFRAA 12 03/23/2020    LABGLOM 10 03/23/2020    GLUCOSE 83 03/23/2020     Monitor :  Sinus with pauses occ runs of afib  CXR We reviewed the films during the inter-disciplinary rounds/conference, which showed lines and tubes ok and changes of COPD    ECHOCARDIOGRAM:   Normal left ventricular size and systolic function. Ejection fraction is visually estimated at 55-60%. Grade II diastolic dysfunction. No regional wall motion abnormalities seen. Mild left ventricular concentric hypertrophy noted. Abnormal LV septal motion consistent with conduction disorder. Normal right ventricular size and function. The left atrium is mildly dilated. No significant valvular abnormalities. Assessment and Plan   Luis Cagle is a 68 y.o. female who was brought to the MICU for 1. Atrial fibrillation with RVR, 2. Unable to use [address] in the setting of GIB, 3. Hypertensive emergency --> uncontrolled 209V systolic, 4. KAYLA - nephrology following  --> ESRD on HD now5. Hyperkalemia, 6. Acute respiratory failure on Bipap, 7. COPD with hx of tobacco use 8. Transaminitis, 9. Hx of ETOH abuse, 10. GIB - melanotic stools and positive FOBTw th negative EGD    IMPRESSION:  1. Acute metabolic encephalopathy of multiple etiology (resolved)  2. Atrial fibrillation with RVR -  Currently on cardizem gtt at 5 mg/hr  3. Unable to use [address] in the setting of GIB  4. Hypertensive emergency --> uncontrolled 317E systolic  5. KAYLA - nephrology following  --> ESRD on HD now  6. Hyperkalemia   7. COPD with hx of tobacco use  8. Transaminitis - Hx of ETOH abuse  9. GIB - melanotic stools and positive FOBT. EGD negative      10. Elevated BNP Heart failure HFpEF DD  11. Prolonged QTc Avoid QT prolonging meds  12. Pleural effusion most likely 2/2 CHF  13. Alcoholism  14. KAYLA- ATN on HD  15. Normocytic anemia - many reason  16. Alcoholic Has had history of withdrawal in the past    PLAN:  1. Got HD with marked improvement  2. Need additional HD  3. AVAPS ordered QHS  4. PT OT  5. COrrect Mg, K , and Phos  6. Increase  MDI   7.  Discharge planning

## 2020-03-23 NOTE — PROGRESS NOTES
Nutrition Assessment    Type and Reason for Visit: Reassess    Nutrition Recommendations: Recommend and start Boost Pudding supplement BID and Nepro renal supplement once daily to help meet increased nutritional needs. Nutrition Assessment: Pt at nutritional risk d/t poor po intake x 10 days since admission (1-25%) ; Pt at nutritional risk d/t increased needs from surgical wound healing ; hx of COPD and ETOH abuse noted ; will start nutritional supplementation     Malnutrition Assessment:  · Malnutrition Status: At risk for malnutrition  · Context: Acute illness or injury  · Findings of the 6 clinical characteristics of malnutrition (Minimum of 2 out of 6 clinical characteristics is required to make the diagnosis of moderate or severe Protein Calorie Malnutrition based on AND/ASPEN Guidelines):  1. Energy Intake-Less than or equal to 50% of estimated energy requirement, Greater than or equal to 7 days    2. Weight Loss-No significant weight loss, unable to assess  3. Fat Loss-Unable to assess,    4. Muscle Loss-Unable to assess,    5. Fluid Accumulation-No significant fluid accumulation,    6.  Strength-Not measured    Nutrition Risk Level:  Moderate    Nutrient Needs:  · Estimated Daily Total Kcal: 7326-3510 ( x 1.2 SF)  · Estimated Daily Protein (g): 70-85 (1.5-1.8g/kg CBW)  · Estimated Daily Total Fluid (ml/day): 2138-1223    Nutrition Diagnosis:   · Problem: Increased nutrient needs  · Etiology: related to Increased demand for energy/nutrients     Signs and symptoms:  as evidenced by Presence of wounds, Known losses from dialysis    Objective Information:  · Nutrition-Focused Physical Findings: +I&Os (+6.7 L), facial edema, active BS, edentulous, A&O x 1, pale, redness to buttocks, poor appetite x 10 days, ammonia level WNL     · Wound Type: Open Wounds, Surgical Wound(trauma wound to pretibial ; incision x 1 to chest )     · Current Nutrition Therapies:  · Oral Diet Orders: 2gm Sodium, Fluid Restriction(1200 FR)   · Oral Diet intake: 1-25%(per flowsheets ; poor appetite x 10 days )  · Oral Nutrition Supplement (ONS) Orders: None     · Anthropometric Measures:  · Ht: 5' 1\" (154.9 cm)   · [facility] 104 [facility]47.2 [facility]  · Ad[facility] Body Wt: 97 lb (44 kg[facility]  · Usual Body Wt: (unable to obtain )  ·  No actual weights available in EMR history; EMR shows past weight of 89# no method on 9/3/19 and 90# no method on 8/1/19   · Ideal Body Wt: 105 lb (47.6 kg), % Ideal Body 99%  · BMI Classification: BMI 18.5 - 24.9 Normal Weight    Nutrition Interventions:   Continue current diet, Start ONS  Continued Inpatient Monitoring, Coordination of Care    Nutrition Evaluation:   · Evaluation: Goals set   · Goals: Patient will consume ~50% of most meals and supplements served     · Monitoring: Meal Intake, Supplement Intake, Diet Tolerance, Skin Integrity, Wound Healing, I&O, Monitor Hemodynamic Status, Mental Status/Confusion, Weight, Pertinent Labs, Chewing/Swallowing      Electronically signed by Gabby Daly RD, LD on 3/23/20 at 10:48 AM EDT    Contact Number: [phone]

## 2020-03-23 NOTE — PROGRESS NOTES
strength/endurance See below       Hand dominance: Right      Strength ROM  Comments   RUE  4/5 WFL G   G FMC/dexterity noted during ADL tasks   LUE 4/5 WFL G   G FMC/dexterity noted during ADL tasks      Hearing: WNL   Sensation:   Pt denies any numbness or tingling in BUE/BLE  Tone: WNL  Edema: Mild Edema R Hand, pt ed for elevation of extremity for edema control      Comments:  OK from RN to see patient. Upon arrival, patient found seated in chair. At end of  session, patient repositioned in b/s chair - Tele-Sitter in place, all devices within reach, all lines and tubes intact, nursing notified of status. Pt required cues and education as noted above for safe facilitation and completion of tasks. Therapist provided skilled monitoring of HR, O2 saturation, blood pressure and patient's response during treatment session. Prior to and at the end of session, environmental modifications/line management completed for patients safety and efficiency of treatment session. Overall, patient demonstrates mild difficulties with completion of BADLs/IADLs and overall functional mobility. Factors contributing to these difficulties include decreased endurance/activity tolerance, SOB/decreased Spo2 with activity requiring multiple rest breaks to focus on proper breathing techniques/pacing, decreased dyn standing balance and generalized weakness (BLE>UE). Pt would benefit from further OT intervention to increase independence, safety, and overall quality of life. Treatment:   · Bed mobility: Facilitated bed mobility with cues for proper body mechanics, pacing and sequencing to prepare for ADL completion.   · Functional transfers/mobility: Facilitated STS/transfers from various surfaces (EOB) with cues for body alignment, safety, pacing and hand placement  · ADL completion: Self-care retraining for the above-mentioned ADLs; training on proper hand placement, safety techniques, sequencing, and energy

## 2020-03-23 NOTE — PROGRESS NOTES
03/23/2020    RDW 14.8 03/23/2020     03/23/2020    MPV 10.7 03/23/2020     CMP:    Lab Results   Component Value Date     03/23/2020    K 3.7 03/23/2020    K 4.4 03/18/2020     03/23/2020    CO2 23 03/23/2020    BUN 17 03/23/2020    CREATININE 4.4 03/23/2020    GFRAA 12 03/23/2020    LABGLOM 10 03/23/2020    GLUCOSE 83 03/23/2020    PROT 5.0 03/23/2020    LABALBU 2.7 03/23/2020    CALCIUM 8.1 03/23/2020    BILITOT 0.4 03/23/2020    ALKPHOS 75 03/23/2020    AST 18 03/23/2020    ALT 53 03/23/2020     Magnesium:    Lab Results   Component Value Date    MG 1.8 03/23/2020     Phosphorus:    Lab Results   Component Value Date    PHOS 3.7 03/23/2020     Radiology Review:      CT Abdomen and pelvis 3/14/20   1. No acute inflammatory changes omental mesenteric fat planes free   intraperitoneal air, bowel obstruction or ascites. .       2. Fluid contents in the colon can indicate impending diarrhea-like   condition. CXR 3/21/20   1. Increased bibasilar opacities. Differential includes atelectasis,   infection, and/or layering pleural effusions.       2. Lines and tubes as detailed above.             BRIEF SUMMARY OF INITIAL CONSULT:    Briefly Mrs. Twila Galeas is a 68-year-old woman with history of HTN, COPD, alcohol abuse, who was admitted on March 13, 2020 transferred from [facility] where she initially presented with left leg weakness and was found to have creatinine level of 3.4 m/dL and a potassium level >7 mEq/L. In view of the lack of vascular surgeon available this week and the consideration of need of dialysis with possible need of dialysis catheter placement patient was transferred to this hospital.  Prior to admission patient reports having nausea vomiting and diarrhea. She denies taking NSAIDs. Her medications prior to admission included spironolactone and potassium supplementation.     Problems resolved:    · Hyperkalemia, multifactorial due to KAYLA, potassium intake and

## 2020-03-23 NOTE — FLOWSHEET NOTE
03/23/20 1131   Vital Signs   BP (!) 179/89   Temp 99.1 °F (37.3 °C)   Pulse 108   Resp 20   Weight 100 lb 12 oz (45.7 kg)   Weight Method Bed scale   Percent Weight Change -3.79   Pain Assessment   Pain Assessment 0-10   Pain Level 0   Post-Hemodialysis Assessment   Post-Treatment Procedures Blood returned;Catheter capped, clamped with Citrate x 2 ports   Machine Disinfection Process Acid/Vinegar Clean;Heat Disinfect; Exterior Machine Disinfection   Rinseback Volume (ml) 300 ml   Total Liters Processed (l/min) 89.4 l/min   Dialyzer Clearance Lightly streaked   Duration of Treatment (minutes) 240 minutes   Heparin amount administered during treatment (units) 0 units   Hemodialysis Intake (ml) 300 ml   Hemodialysis Output (ml) 2800 ml   NET Removed (ml) 2500 ml   Tolerated Treatment Good   Patient Response to Treatment tolerated well, profile B, refill noted, 2500cc fluid removal   Bilateral Breath Sounds Diminished   Edema Facial   Physician Notified?  No

## 2020-03-23 NOTE — PROGRESS NOTES
Physical Therapy  Physical Therapy Re-Evaluation  Name: Rafal Mejía  : 1950  MRN: [id number]    Referring Provider:  Micha Madrid MD    Date of Service: 3/23/2020  Evaluation PT: Nain Suzette, PT, DPT [id number]  Re-Evaluating PT:  Lauren Domínguez, PT, DPT [id number]    Room #:  [facility]  Diagnosis:  Hyperkalemia  PMHx/PSHx:  Initially no PMHx on file. During length stay pt was reported to have end stage COPD, acute renal failure with hyperkalemia, a-fib with tachycardia  Procedure/Surgery:  Esophagogastroduodenoscopy 3/15/2020, Insertion of right internal jugular vein tunneled hemodialysis catheter (24938), fluoroscopy (83100-11) 3/18/2020  Precautions:  Falls, hemodialysis central access R subclavian vein, O2, pierre  Equipment Needs:  [facility]    SUBJECTIVE:    Pt lives with son and daughter-in-law in a tri-level home with 4 stairs to enter and 1 rail. Bed is on first floor and bath is on first floor. Pt is poor historian regarding functional and social history. Initially stated she was independent and working PTA, but later stated she hasn't been able to work or walk d/t the progressing weakness and numbness in her BLE. Reason for re-evaluation:  Significant change in status since initial evaluation including intubation and extubation. OBJECTIVE:   Initial Evaluation  Date: 3/23/2020 Treatment Short Term/ Long Term   Goals   AM-PAC 6 Clicks 48/57     Was pt agreeable to Eval/treatment? yes     Does pt have pain? 8/10 \"soreness\" in BLE no obvious signs of pain or discomfort with ambulation however.      Bed Mobility  Rolling: Rika  Supine to sit: Rika  Sit to supine: Rika  Scooting: Rika  Rolling: SBA  Supine to sit: SBA  Sit to supine: SBA  Scooting: SBA   Transfers Sit to stand: Rika  Stand to sit: Rika  Stand pivot: Rika [facility]  Sit to stand: SBA  Stand to sit: SBA  Stand pivot: SBA [facility]   Ambulation    25 feet with modA [facility]  50 feet with [facility] Rika   Stair negotiation: ascended and descended  NT  4 steps with 1 rail Rika   ROM BUE:  Per OT note  BLE:  WNL     Strength BUE:  Per OT note  BLE:  Grossly 4-/5     Balance Sitting EOB:  SBA  Dynamic Standing:  modA front [address]  Sitting EOB:  Independent  Dynamic Standing:  Rika [address]     Pt is A & O x 4  Sensation:  Pt denies numbness and tingling to extremities  Edema:  unremarkable    Vitals:  Spo2 monitored throughout session on 5L NC. O2 saturation >95% throughout session. Patient education  Pt educated on role of PT intervention. Importance of continued participation in mobility training and maximizing OOB time for promotion of improved cardiorespiratory function. Patient response to education:   Pt verbalized understanding Pt demonstrated skill Pt requires further education in this area   yes yes yes     ASSESSMENT:    Comments:  Pt received supine in bed and agreeable to PT intervention at this time. Pt alert and oriented but poor historian regarding medical and social history regarding PLOF. Pt had conflicting reports of being independent and working PTA as well as being unable to ambulate d/t progressing LE weakness. Pt unable to state when weakness began or when the last time she worked was. Pt performed all functional mobility as noted above requiring physical assistance to complete. Pt is unsteady with transfer/ambulation and would therefore benefit from continued skilled PT intervention prior to returning home to ensure she can maximize her functional independence. At end of session pt transferred to recliner, repositioned for comfort, lunch tray provided and pt encouraged to increase food intake as she has not been eating much. Pt left with all needs met and call light in reach. Treatment:  Patient practiced and was instructed in the following treatment:     Therapeutic Activities:  o Functional Mobility as noted above: Max VC provided throughout session with moderate follow through.   Pt often requiring multiple verbal prompts to complete tasks with good technique. Physical assistance provided for stand pivot transfers and ambulation d/t generalized weakness and unsteadiness. Additional time taken to complete d/t pt's slowed movements and generalized weakness. o Skilled repositioning in recliner to promote good posture and improved cardiorespiratory function. Pt's/ family goals   1. Get stronger before going home. Patient and or family understand(s) diagnosis, prognosis, and plan of care. yes    PLAN:    PT care will be provided in accordance with the objectives noted above. Exercises and functional mobility practice will be used as well as appropriate assistive devices or modalities to obtain goals. Patient and family education will also be administered as needed. Frequency of treatments: 2-5x/week x 1-2 weeks. Time in  1300  Time out  1335    Total Treatment Time  25 minutes     Evaluation Time includes thorough review of current medical information, gathering information on past medical history/social history and prior level of function, completion of standardized testing/informal observation of tasks, assessment of data and education on plan of care and goals.     CPT codes:  [] Low Complexity PT evaluation 25035  [] Moderate Complexity PT evaluation 79312  [] High Complexity PT evaluation 63424  [x] PT Re-evaluation 89230  [] Gait training 32260 0 minutes  [] Manual therapy 04682 0 minutes  [x] Therapeutic activities 38387 25 minutes  [] Therapeutic exercises 59416 0 minutes  [] Neuromuscular reeducation 46508 0 minutes     Barbara Mckenzie PT, DPT  [id number]

## 2020-03-23 NOTE — CARE COORDINATION
SOCIAL WORK/CASEMANAGEMENT TRANSITION OF CARE PLANNING: pt out of room for hemodialysis this a.m. the plan is to [facility]. Pt  was accepted to [facility] with inpatient dialysis there, however, there was a note to call aida the significant other. I called him and he wants evin. They can accept pt and provide transportation to and from hemo dialysis. I  Have called [facility] to get a mwf chair time and for snf to start precert once the PT and OT notes are updated today. All discharge paperwork is in place with exempt. Pt came back from dialysis and she is in agreement to ph[facility]. Gretel Sher  3/23/2020    The Plan for Transition of Care is related to the following treatment goals: The Patient and/or patient representative  was provided with a choice of provider and agrees   with the discharge plan. [x] Yes [] No  Freedom of choice list was provided with basic dialogue that supports the patient's individualized plan of care/goals, treatment preferences and shares the quality data associated with the providers.  [x] Yes [] No

## 2020-03-24 LAB
ALBUMIN SERPL-MCNC: 2.3 G/DL (ref 3.5–5.2)
ALP BLD-CCNC: 66 U/L (ref 35–104)
ALT SERPL-CCNC: 38 U/L (ref 0–32)
ANION GAP SERPL CALCULATED.3IONS-SCNC: 14 MMOL/L (ref 7–16)
ANION GAP SERPL CALCULATED.3IONS-SCNC: 15 MMOL/L (ref 7–16)
AST SERPL-CCNC: 16 U/L (ref 0–31)
BASOPHILS ABSOLUTE: 0.01 E9/L (ref 0–0.2)
BASOPHILS RELATIVE PERCENT: 0.1 % (ref 0–2)
BILIRUB SERPL-MCNC: 0.3 MG/DL (ref 0–1.2)
BUN BLDV-MCNC: 12 MG/DL (ref 8–23)
BUN BLDV-MCNC: 9 MG/DL (ref 8–23)
CALCIUM SERPL-MCNC: 6.7 MG/DL (ref 8.6–10.2)
CALCIUM SERPL-MCNC: 8.1 MG/DL (ref 8.6–10.2)
CHLORIDE BLD-SCNC: 104 MMOL/L (ref 98–107)
CHLORIDE BLD-SCNC: 96 MMOL/L (ref 98–107)
CO2: 22 MMOL/L (ref 22–29)
CO2: 25 MMOL/L (ref 22–29)
CREAT SERPL-MCNC: 2.5 MG/DL (ref 0.5–1)
CREAT SERPL-MCNC: 3.6 MG/DL (ref 0.5–1)
EOSINOPHILS ABSOLUTE: 0 E9/L (ref 0.05–0.5)
EOSINOPHILS RELATIVE PERCENT: 0 % (ref 0–6)
GFR AFRICAN AMERICAN: 15
GFR AFRICAN AMERICAN: 23
GFR NON-AFRICAN AMERICAN: 12 ML/MIN/1.73
GFR NON-AFRICAN AMERICAN: 19 ML/MIN/1.73
GLUCOSE BLD-MCNC: 90 MG/DL (ref 74–99)
GLUCOSE BLD-MCNC: 91 MG/DL (ref 74–99)
HCT VFR BLD CALC: 26.6 % (ref 34–48)
HEMOGLOBIN: 8.1 G/DL (ref 11.5–15.5)
IMMATURE GRANULOCYTES #: 0.11 E9/L
IMMATURE GRANULOCYTES %: 1.1 % (ref 0–5)
LYMPHOCYTES ABSOLUTE: 1.19 E9/L (ref 1.5–4)
LYMPHOCYTES RELATIVE PERCENT: 11.7 % (ref 20–42)
MAGNESIUM: 1.5 MG/DL (ref 1.6–2.6)
MCH RBC QN AUTO: 28.5 PG (ref 26–35)
MCHC RBC AUTO-ENTMCNC: 30.5 % (ref 32–34.5)
MCV RBC AUTO: 93.7 FL (ref 80–99.9)
METER GLUCOSE: 109 MG/DL (ref 74–99)
MONOCYTES ABSOLUTE: 1.27 E9/L (ref 0.1–0.95)
MONOCYTES RELATIVE PERCENT: 12.5 % (ref 2–12)
NEUTROPHILS ABSOLUTE: 7.6 E9/L (ref 1.8–7.3)
NEUTROPHILS RELATIVE PERCENT: 74.6 % (ref 43–80)
PDW BLD-RTO: 14.6 FL (ref 11.5–15)
PHOSPHORUS: 1.9 MG/DL (ref 2.5–4.5)
PLATELET # BLD: 182 E9/L (ref 130–450)
PMV BLD AUTO: 10.6 FL (ref 7–12)
POTASSIUM SERPL-SCNC: 3 MMOL/L (ref 3.5–5)
POTASSIUM SERPL-SCNC: 3.7 MMOL/L (ref 3.5–5)
RBC # BLD: 2.84 E12/L (ref 3.5–5.5)
SODIUM BLD-SCNC: 136 MMOL/L (ref 132–146)
SODIUM BLD-SCNC: 140 MMOL/L (ref 132–146)
TOTAL PROTEIN: 4.1 G/DL (ref 6.4–8.3)
WBC # BLD: 10.2 E9/L (ref 4.5–11.5)

## 2020-03-24 PROCEDURE — 84100 ASSAY OF PHOSPHORUS: CPT

## 2020-03-24 PROCEDURE — 2700000000 HC OXYGEN THERAPY PER DAY

## 2020-03-24 PROCEDURE — 97530 THERAPEUTIC ACTIVITIES: CPT

## 2020-03-24 PROCEDURE — 85025 COMPLETE CBC W/AUTO DIFF WBC: CPT

## 2020-03-24 PROCEDURE — 80048 BASIC METABOLIC PNL TOTAL CA: CPT

## 2020-03-24 PROCEDURE — 36592 COLLECT BLOOD FROM PICC: CPT

## 2020-03-24 PROCEDURE — 80053 COMPREHEN METABOLIC PANEL: CPT

## 2020-03-24 PROCEDURE — 6370000000 HC RX 637 (ALT 250 FOR IP): Performed by: INTERNAL MEDICINE

## 2020-03-24 PROCEDURE — 99232 SBSQ HOSP IP/OBS MODERATE 35: CPT | Performed by: INTERNAL MEDICINE

## 2020-03-24 PROCEDURE — 2140000000 HC CCU INTERMEDIATE R&B

## 2020-03-24 PROCEDURE — 2580000003 HC RX 258: Performed by: INTERNAL MEDICINE

## 2020-03-24 PROCEDURE — 82962 GLUCOSE BLOOD TEST: CPT

## 2020-03-24 PROCEDURE — 94660 CPAP INITIATION&MGMT: CPT

## 2020-03-24 PROCEDURE — 6360000002 HC RX W HCPCS: Performed by: INTERNAL MEDICINE

## 2020-03-24 PROCEDURE — 36415 COLL VENOUS BLD VENIPUNCTURE: CPT

## 2020-03-24 PROCEDURE — 2500000003 HC RX 250 WO HCPCS: Performed by: INTERNAL MEDICINE

## 2020-03-24 PROCEDURE — 83735 ASSAY OF MAGNESIUM: CPT

## 2020-03-24 RX ORDER — MAGNESIUM SULFATE 1 G/100ML
1 INJECTION INTRAVENOUS ONCE
Status: COMPLETED | OUTPATIENT
Start: 2020-03-24 | End: 2020-03-24

## 2020-03-24 RX ORDER — ERGOCALCIFEROL 1.25 MG/1
50000 CAPSULE ORAL WEEKLY
Status: DISCONTINUED | OUTPATIENT
Start: 2020-03-24 | End: 2020-03-26 | Stop reason: HOSPADM

## 2020-03-24 RX ADMIN — HYDRALAZINE HYDROCHLORIDE 75 MG: 25 TABLET, FILM COATED ORAL at 05:10

## 2020-03-24 RX ADMIN — ERGOCALCIFEROL 50000 UNITS: 1.25 CAPSULE ORAL at 09:51

## 2020-03-24 RX ADMIN — Medication 10 ML: at 21:17

## 2020-03-24 RX ADMIN — ALBUTEROL SULFATE 2 PUFF: 90 AEROSOL, METERED RESPIRATORY (INHALATION) at 05:10

## 2020-03-24 RX ADMIN — FERROUS SULFATE TAB 325 MG (65 MG ELEMENTAL FE) 325 MG: 325 (65 FE) TAB at 15:55

## 2020-03-24 RX ADMIN — IPRATROPIUM BROMIDE 2 PUFF: 17 AEROSOL, METERED RESPIRATORY (INHALATION) at 15:55

## 2020-03-24 RX ADMIN — HYDRALAZINE HYDROCHLORIDE 75 MG: 25 TABLET, FILM COATED ORAL at 21:17

## 2020-03-24 RX ADMIN — SODIUM CHLORIDE, PRESERVATIVE FREE 10 ML: 5 INJECTION INTRAVENOUS at 05:10

## 2020-03-24 RX ADMIN — IPRATROPIUM BROMIDE 2 PUFF: 17 AEROSOL, METERED RESPIRATORY (INHALATION) at 05:10

## 2020-03-24 RX ADMIN — POTASSIUM PHOSPHATE, MONOBASIC AND POTASSIUM PHOSPHATE, DIBASIC 10 MMOL: 224; 236 INJECTION, SOLUTION, CONCENTRATE INTRAVENOUS at 09:52

## 2020-03-24 RX ADMIN — IPRATROPIUM BROMIDE 2 PUFF: 17 AEROSOL, METERED RESPIRATORY (INHALATION) at 19:43

## 2020-03-24 RX ADMIN — METOPROLOL SUCCINATE 100 MG: 100 TABLET, FILM COATED, EXTENDED RELEASE ORAL at 09:51

## 2020-03-24 RX ADMIN — MAGNESIUM SULFATE HEPTAHYDRATE 1 G: 1 INJECTION, SOLUTION INTRAVENOUS at 09:52

## 2020-03-24 RX ADMIN — Medication 10 ML: at 09:52

## 2020-03-24 RX ADMIN — HYDRALAZINE HYDROCHLORIDE 75 MG: 25 TABLET, FILM COATED ORAL at 15:57

## 2020-03-24 RX ADMIN — ALBUTEROL SULFATE 2 PUFF: 90 AEROSOL, METERED RESPIRATORY (INHALATION) at 19:43

## 2020-03-24 RX ADMIN — AMLODIPINE BESYLATE 10 MG: 10 TABLET ORAL at 15:54

## 2020-03-24 RX ADMIN — FERROUS SULFATE TAB 325 MG (65 MG ELEMENTAL FE) 325 MG: 325 (65 FE) TAB at 09:51

## 2020-03-24 RX ADMIN — ALBUTEROL SULFATE 2 PUFF: 90 AEROSOL, METERED RESPIRATORY (INHALATION) at 15:55

## 2020-03-24 ASSESSMENT — PAIN SCALES - GENERAL
PAINLEVEL_OUTOF10: 0

## 2020-03-24 NOTE — PROGRESS NOTES
Department of Internal Medicine  Nephrology Attending Consult Note    Events reviewed. SUBJECTIVE: We are following Mrs. Guo Phlegm for KAYLA. Reports no complaints.        PHYSICAL EXAM:      Vitals:    VITALS:  BP (!) 174/0 Comment: over palp  Pulse 82   Temp 99.2 °F (37.3 °C) (Temporal)   Resp 20   Ht 5' 1\" (1.549 m)   Wt 96 lb 6.4 oz (43.7 kg)   SpO2 98%   BMI 18.21 kg/m²   24HR INTAKE/OUTPUT:      Intake/Output Summary (Last 24 hours) at 3/24/2020 1053  Last data filed at 3/24/2020 1085  Gross per 24 hour   Intake 560 ml   Output 3225 ml   Net -2665 ml     Access: Right IJV tunneled dialysis catheter   Constitutional: Patient is awake alert she is on BiPAP  HEENT: Pupils are equal reactive, mucous membranes are very dry  Respiratory: Lungs are clear  Cardiovascular/Edema: Heart sounds are regular  Gastrointestinal: Abdomen soft  Neurologic: Nonfocal  Skin: No lesions  Other: No edema    Scheduled Meds:   vitamin D  50,000 Units Oral Weekly    potassium phosphate IVPB  10 mmol Intravenous Once    hydrALAZINE  75 mg Oral 3 times per day    albuterol sulfate HFA  2 puff Inhalation Q6H    ipratropium  2 puff Inhalation Q6H    sodium chloride flush  10 mL Intravenous 2 times per day    [Held by provider] heparin (porcine)  5,000 Units Subcutaneous 3 times per day    epoetin pavan-epbx  3,000 Units Subcutaneous Once per day on Mon Wed Fri    metoprolol succinate  100 mg Oral Daily    amLODIPine  10 mg Oral Daily    ferrous sulfate  325 mg Oral BID WC    dextrose  25 g Intravenous Once     Continuous Infusions:   dextrose       PRN Meds:.hydrALAZINE, labetalol, midazolam, sodium chloride flush, acetaminophen **OR** acetaminophen, [DISCONTINUED] promethazine **OR** ondansetron, anticoagulant sodium citrate, glucagon (rDNA), glucose, dextrose, glucagon (rDNA), dextrose, perflutren lipid microspheres    DATA:    CBC:   Lab Results   Component Value Date    WBC 10.2 03/24/2020    RBC 2.84 03/24/2020    HGB

## 2020-03-24 NOTE — CARE COORDINATION
SOCIAL WORK/CASEMANAGEMENT TRANSITION OF CARE PLANNING: dialysis chair time obtained for [facility] for  tue, thurs, sat at 12:20 pm. , first treatment there will start 12/16/20 Thursday and must be there at 27:01 p.m. precert is pending. They will set up transport when they obtain it from snf. Pt is in agreement when we met again this a.m. Gwendolyn [place]  3/24/2020    Dr. Bridgette De wants to hold discharge til tomorrow since pt is making urine. He said he may be able to stop dialysis if continues and pull tessio.  [facility]  3/24/2020

## 2020-03-24 NOTE — PROGRESS NOTES
[address] [facility]  Department of Internal Medicine  Division of Pulmonary, Critical Care and Sleep Medicine  Progress Note      Patient:  Shankar Lara 68 y.o. female  MRN: [id number]     Date of Service: 3/24/2020    Allergy: Patient has no known allergies. Subjective     EGD normal  States has home oxygen but doesn't use it much  Ambulating with walker    Objective   I & O - 24hr:     Intake/Output Summary (Last 24 hours) at 3/24/2020 1153  Last data filed at 3/24/2020 0606  Gross per 24 hour   Intake 260 ml   Output 425 ml   Net -165 ml       Physical Exam:   · Vitals: BP (!) 174/0 Comment: over palp  Pulse 82   Temp 99.2 °F (37.3 °C) (Temporal)   Resp 20   Ht 5' 1\" (1.549 m)   Wt 96 lb 6.4 oz (43.7 kg)   SpO2 98%   BMI 18.21 kg/m²       Constitutional: Alert. Head: Normocephalic and atraumatic. Eyes: PERRL, (-) conjunctivitis, (-) scleral icterus. Mucus membranes moist.  Neck: (-)  swelling, (-) JVD   Respiratory: (+) bilateral  wheezes, (+)  rales, (+)  rhonchi. Cardiovascular: RRR. (-)  murmurs,   GI:  Abdomen soft, (-) tenderness (-) distension  (+) BS. (-)  rebound, (-) guarding, (-) rigidity. Extremities:  (-) clubbing, (-) cyanosis. 2+ distal pulses. (-) peripheral edema. Neurologic:  No focal neurological deficits.       Labs     CBC:   Lab Results   Component Value Date    WBC 10.2 03/24/2020    RBC 2.84 03/24/2020    HGB 8.1 03/24/2020    HCT 26.6 03/24/2020    MCV 93.7 03/24/2020    MCH 28.5 03/24/2020    MCHC 30.5 03/24/2020    RDW 14.6 03/24/2020     03/24/2020    MPV 10.6 03/24/2020     BMP:    Lab Results   Component Value Date     03/24/2020    K 3.0 03/24/2020    K 4.4 03/18/2020     03/24/2020    CO2 22 03/24/2020    BUN 9 03/24/2020    LABALBU 2.3 03/24/2020    CREATININE 2.5 03/24/2020    CALCIUM 6.7 03/24/2020    GFRAA 23 03/24/2020    LABGLOM 19 03/24/2020    GLUCOSE 90 03/24/2020     Monitor needed,            Connor Vincent DO, MPH  Professor of Internal Medicine

## 2020-03-24 NOTE — PROGRESS NOTES
o Skilled repositioning in recliner to promote good posture and improved cardiorespiratory function. PLAN:    Patient is making fair progress towards established goals. Will continue with current POC.       Time in  1415  Time out  1425    Total Treatment Time  10 minutes     CPT codes:  [] Gait training 11540 0 minutes  [] Manual therapy 09416 0 minutes  [x] Therapeutic activities 22804 10 minutes  [] Therapeutic exercises 65515 0 minutes  [] Neuromuscular reeducation 42033 0 minutes    Contreras Donaldson PT, DPT  [id number]

## 2020-03-24 NOTE — PLAN OF CARE
Problem: Falls - Risk of:  Goal: Absence of physical injury  Description: Absence of physical injury  Outcome: Met This Shift     Problem: Breathing Pattern - Ineffective:  Goal: Ability to achieve and maintain a regular respiratory rate will improve  Description: Ability to achieve and maintain a regular respiratory rate will improve  Outcome: Ongoing     Problem: Pain:  Goal: Pain level will decrease  Description: Pain level will decrease  Outcome: Met This Shift  Goal: Control of acute pain  Description: Control of acute pain  Outcome: Met This Shift  Goal: Control of chronic pain  Description: Control of chronic pain  Outcome: Met This Shift

## 2020-03-25 LAB
ALBUMIN SERPL-MCNC: 2.6 G/DL (ref 3.5–5.2)
ALP BLD-CCNC: 76 U/L (ref 35–104)
ALT SERPL-CCNC: 39 U/L (ref 0–32)
ANION GAP SERPL CALCULATED.3IONS-SCNC: 14 MMOL/L (ref 7–16)
AST SERPL-CCNC: 17 U/L (ref 0–31)
BASOPHILS ABSOLUTE: 0.03 E9/L (ref 0–0.2)
BASOPHILS RELATIVE PERCENT: 0.3 % (ref 0–2)
BILIRUB SERPL-MCNC: 0.3 MG/DL (ref 0–1.2)
BUN BLDV-MCNC: 13 MG/DL (ref 8–23)
CALCIUM SERPL-MCNC: 8 MG/DL (ref 8.6–10.2)
CHLORIDE BLD-SCNC: 95 MMOL/L (ref 98–107)
CO2: 26 MMOL/L (ref 22–29)
CREAT SERPL-MCNC: 4.1 MG/DL (ref 0.5–1)
EOSINOPHILS ABSOLUTE: 0 E9/L (ref 0.05–0.5)
EOSINOPHILS RELATIVE PERCENT: 0 % (ref 0–6)
GFR AFRICAN AMERICAN: 13
GFR NON-AFRICAN AMERICAN: 11 ML/MIN/1.73
GLUCOSE BLD-MCNC: 87 MG/DL (ref 74–99)
HCT VFR BLD CALC: 26.7 % (ref 34–48)
HEMOGLOBIN: 8.1 G/DL (ref 11.5–15.5)
IMMATURE GRANULOCYTES #: 0.09 E9/L
IMMATURE GRANULOCYTES %: 0.9 % (ref 0–5)
LYMPHOCYTES ABSOLUTE: 1.12 E9/L (ref 1.5–4)
LYMPHOCYTES RELATIVE PERCENT: 11.7 % (ref 20–42)
MAGNESIUM: 1.9 MG/DL (ref 1.6–2.6)
MCH RBC QN AUTO: 27.8 PG (ref 26–35)
MCHC RBC AUTO-ENTMCNC: 30.3 % (ref 32–34.5)
MCV RBC AUTO: 91.8 FL (ref 80–99.9)
MONOCYTES ABSOLUTE: 1.01 E9/L (ref 0.1–0.95)
MONOCYTES RELATIVE PERCENT: 10.6 % (ref 2–12)
NEUTROPHILS ABSOLUTE: 7.32 E9/L (ref 1.8–7.3)
NEUTROPHILS RELATIVE PERCENT: 76.5 % (ref 43–80)
PDW BLD-RTO: 14.4 FL (ref 11.5–15)
PHOSPHORUS: 3 MG/DL (ref 2.5–4.5)
PLATELET # BLD: 178 E9/L (ref 130–450)
PMV BLD AUTO: 10.7 FL (ref 7–12)
POTASSIUM SERPL-SCNC: 3.6 MMOL/L (ref 3.5–5)
RBC # BLD: 2.91 E12/L (ref 3.5–5.5)
SODIUM BLD-SCNC: 135 MMOL/L (ref 132–146)
TOTAL PROTEIN: 4.8 G/DL (ref 6.4–8.3)
WBC # BLD: 9.6 E9/L (ref 4.5–11.5)

## 2020-03-25 PROCEDURE — 80053 COMPREHEN METABOLIC PANEL: CPT

## 2020-03-25 PROCEDURE — 2700000000 HC OXYGEN THERAPY PER DAY

## 2020-03-25 PROCEDURE — 94660 CPAP INITIATION&MGMT: CPT

## 2020-03-25 PROCEDURE — 36592 COLLECT BLOOD FROM PICC: CPT

## 2020-03-25 PROCEDURE — 5A1D70Z PERFORMANCE OF URINARY FILTRATION, INTERMITTENT, LESS THAN 6 HOURS PER DAY: ICD-10-PCS | Performed by: INTERNAL MEDICINE

## 2020-03-25 PROCEDURE — 97530 THERAPEUTIC ACTIVITIES: CPT | Performed by: PHYSICAL THERAPIST

## 2020-03-25 PROCEDURE — 6370000000 HC RX 637 (ALT 250 FOR IP): Performed by: INTERNAL MEDICINE

## 2020-03-25 PROCEDURE — 6360000002 HC RX W HCPCS: Performed by: INTERNAL MEDICINE

## 2020-03-25 PROCEDURE — 84100 ASSAY OF PHOSPHORUS: CPT

## 2020-03-25 PROCEDURE — 97110 THERAPEUTIC EXERCISES: CPT | Performed by: PHYSICAL THERAPIST

## 2020-03-25 PROCEDURE — 90935 HEMODIALYSIS ONE EVALUATION: CPT | Performed by: INTERNAL MEDICINE

## 2020-03-25 PROCEDURE — 83735 ASSAY OF MAGNESIUM: CPT

## 2020-03-25 PROCEDURE — 99232 SBSQ HOSP IP/OBS MODERATE 35: CPT | Performed by: INTERNAL MEDICINE

## 2020-03-25 PROCEDURE — 2580000003 HC RX 258: Performed by: INTERNAL MEDICINE

## 2020-03-25 PROCEDURE — 2140000000 HC CCU INTERMEDIATE R&B

## 2020-03-25 PROCEDURE — 85025 COMPLETE CBC W/AUTO DIFF WBC: CPT

## 2020-03-25 PROCEDURE — 36415 COLL VENOUS BLD VENIPUNCTURE: CPT

## 2020-03-25 RX ORDER — BUMETANIDE 1 MG/1
2 TABLET ORAL DAILY
Status: DISCONTINUED | OUTPATIENT
Start: 2020-03-25 | End: 2020-03-26 | Stop reason: HOSPADM

## 2020-03-25 RX ORDER — AMLODIPINE BESYLATE 10 MG/1
10 TABLET ORAL DAILY
Status: DISCONTINUED | OUTPATIENT
Start: 2020-03-26 | End: 2020-03-26 | Stop reason: HOSPADM

## 2020-03-25 RX ADMIN — ALBUTEROL SULFATE 2 PUFF: 90 AEROSOL, METERED RESPIRATORY (INHALATION) at 05:58

## 2020-03-25 RX ADMIN — IPRATROPIUM BROMIDE 2 PUFF: 17 AEROSOL, METERED RESPIRATORY (INHALATION) at 05:58

## 2020-03-25 RX ADMIN — ALBUTEROL SULFATE 2 PUFF: 90 AEROSOL, METERED RESPIRATORY (INHALATION) at 20:00

## 2020-03-25 RX ADMIN — FERROUS SULFATE TAB 325 MG (65 MG ELEMENTAL FE) 325 MG: 325 (65 FE) TAB at 17:11

## 2020-03-25 RX ADMIN — AMLODIPINE BESYLATE 10 MG: 10 TABLET ORAL at 14:53

## 2020-03-25 RX ADMIN — HYDRALAZINE HYDROCHLORIDE 75 MG: 25 TABLET, FILM COATED ORAL at 21:56

## 2020-03-25 RX ADMIN — ALBUTEROL SULFATE 2 PUFF: 90 AEROSOL, METERED RESPIRATORY (INHALATION) at 14:40

## 2020-03-25 RX ADMIN — HYDRALAZINE HYDROCHLORIDE 75 MG: 25 TABLET, FILM COATED ORAL at 14:38

## 2020-03-25 RX ADMIN — FERROUS SULFATE TAB 325 MG (65 MG ELEMENTAL FE) 325 MG: 325 (65 FE) TAB at 11:24

## 2020-03-25 RX ADMIN — Medication 10 ML: at 11:24

## 2020-03-25 RX ADMIN — ALBUTEROL SULFATE 2 PUFF: 90 AEROSOL, METERED RESPIRATORY (INHALATION) at 00:01

## 2020-03-25 RX ADMIN — HYDRALAZINE HYDROCHLORIDE 75 MG: 25 TABLET, FILM COATED ORAL at 05:13

## 2020-03-25 RX ADMIN — Medication 10 ML: at 21:57

## 2020-03-25 RX ADMIN — IPRATROPIUM BROMIDE 2 PUFF: 17 AEROSOL, METERED RESPIRATORY (INHALATION) at 00:01

## 2020-03-25 RX ADMIN — BUMETANIDE 2 MG: 1 TABLET ORAL at 14:47

## 2020-03-25 RX ADMIN — METOPROLOL SUCCINATE 100 MG: 100 TABLET, FILM COATED, EXTENDED RELEASE ORAL at 11:25

## 2020-03-25 RX ADMIN — IPRATROPIUM BROMIDE 2 PUFF: 17 AEROSOL, METERED RESPIRATORY (INHALATION) at 14:40

## 2020-03-25 RX ADMIN — EPOETIN ALFA-EPBX 3000 UNITS: 3000 INJECTION, SOLUTION INTRAVENOUS; SUBCUTANEOUS at 11:24

## 2020-03-25 RX ADMIN — IPRATROPIUM BROMIDE 2 PUFF: 17 AEROSOL, METERED RESPIRATORY (INHALATION) at 20:00

## 2020-03-25 ASSESSMENT — PAIN SCALES - GENERAL
PAINLEVEL_OUTOF10: 0

## 2020-03-25 ASSESSMENT — EJECTION FRACTION
EF_VALUE: 55-60%
EF_SOURCE: 2D ECHO

## 2020-03-25 NOTE — CARE COORDINATION
Social Work Discharge Planning:  SW followed up with Tejas Arauz from [employer] is still pending.  SW following  Electronically signed by JOSE Brooks on 3/25/2020 at 12:58 PM

## 2020-03-25 NOTE — DISCHARGE SUMMARY
Physician Discharge Summary     Patient ID:  Valdemar Kay  [id number]  89 y.o.  1950    Admit date: 3/13/2020    Discharge date and time:3/26  Admission Diagnoses:   Patient Active Problem List   Diagnosis    Hyperkalemia    Atrial fibrillation ([facility] [address]    Acute renal insufficiency    Chronic heart failure with preserved ejection fraction (HFpEF) (HCC)    Acute and chr resp failure, unsp w hypoxia or hypercapnia (HCC)    Essential hypertension       Discharge Diagnoses:hyperkalemia, REsp failure, acute renal failure/capital ATN stage III requiring hemodialysis, uncontrolled hypertension, alcohol withdrawals, altered mental status    Consults: Renal, pulmonary critical care, vascular,    Procedures: Intubation, hemodialysis, right IJ tunneled hemodialysis catheter placement    Hospital Course: The patient is a 68 y.o. female of Dr. Allan Rossi with significant past medical history of end-stage COPD who presents with complaints of weakness lethargy. Patient noted at [facility] to be bradycardic followed by atrial fibrillation with rapid ventricular rate. Patient also noted to be in acute renal failure with hyperkalemia. proBNP 50,000. Transferred to medical intensive care unit [facility]. On my evaluation, she is slow to respond but does seem to be appropriate. Remains on Cardizem drip. She is noted to be anemic as well with melena-source thought to be upper GI bleeding. She was transferred to [facility] secondary to inability to do vascular access at [facility] for possible emergent hemodialysis given hyperkalemia.   BP (!) 162/88   Pulse 127   Temp 98.5 °F (36.9 °C) (Axillary)   Resp 18   Wt 97 lb (44 kg)   SpO2 98%   BMI 18.33 kg/m²   CT abdomen pelvis no acute inflammatory changes     Admit to medical intensive care unit for multiple issues  Transferred out of icu 3/16- RRT called for AMS and pt pulling out HD catheter - profuse bleeding with hypotension - transferred back to icu and intubated for airway protection  Out of icu  3/21  Patient has done well since transferred out of ICU without any acute issues  She will continue to have ongoing hemodialysis via right IJ tunneled catheter        Acute anemia after pulling out HD catheter-resolved  Status post EGD-grossly normal,   serial H&H's-stable ~8.5  S/p  placement of tessio  Continue ferrous sulfate     Hyperkalemia with acute renal failure-resolved  Offending agents in the form of Aldactone/oral potassium supplements discontinued  Ongoing hemodialysis  Status post Tessio  3/17/2020   BUN/creatinine remains improved with ongoing dialysis   renal following  Patient to follow-up with renal service for ongoing dialysis needs        Accelerated hypertension-  Still markedly elevated in the 18o to 200 range  Transfuse if hemoglobin falls below 7-currently 7.6  Resume home medications of metoprolol based on blood pressures-increase dose back to 100 twice daily with parameters  On multiple agents currently which she will be discharged home on     ETOH withdrawls leading to AMS ? Ammonia level unremarkable  Ativan as needed for withdrawls-not requiring CIWA protocol any longer  Stable and mentation at baseline at discharge    Recent Labs     20  0455 20  0525 20  0524   WBC 10.2 9.6 6.5   HGB 8.1* 8.1* 8.4*   HCT 26.6* 26.7* 27.0*    178 160       Recent Labs     20  1940 20  0525 20  0524    135 137   K 3.7 3.6 4.0   CL 96* 95* 99   CO2 25 26 29   BUN 12 13 8   CREATININE 3.6* 4.1* 2.5*   CALCIUM 8.1* 8.0* 7.9*       Ct Abdomen Pelvis Wo Contrast Additional Contrast? None    Result Date: 3/14/2020  Patient MRN:  [id number] : 1950 Age: 68 years Gender: Female Order Date:  3/14/2020 1:45 AM TECHNIQUE/NUMBER OF IMAGES/COMPARISON/CLINICAL HISTORY: CT abdomen pelvis Axial images #coronal reconstructions are. History abnormal liver function tests is.  The 68-year-old female patient. No previous studies available for comparison. FINDINGS: Presence of bilateral pleural effusions in mild-to-moderate degree on the right and in mild degree on the left-sided. The they cause some compression atelectasis in the lower lung bases particularly on the right side. The heart has normal size. Calcifications are seen in the coronary arteries. The liver has normal size and density. The gallbladder is well distended and appears unremarkable. The biliary tree and pancreatic ductal systems are not dilated. The pancreas has some atrophy otherwise appear unremarkable. The spleen has unremarkable appearance. There is an asymmetry of the size of the kidneys. Calcifications the left kidney are at least in part related with calcification of the renal arteries are. If one represent a renal calculus who be of small size in causing no obstruction. Also a calcification of the right kidney appears to be more related with a renal artery. There is no obstructive uropathy on the right on the left. Calcified atheroma changes seen in the abdominal aorta with prominent calcified plaque towards the lumen of the aorta in the upper segment including the origin of for the superior mesenteric artery. This study is done without IV contrast. No aneurysm of the abdominal aorta seen. There is no indication for bowel obstruction. There are some fluid contents in the left-sided colon which can indicate impending diarrhea-like condition to be correlate clinically. The There is no pericolonic inflammatory process. The appendix seen and has unremarkable appearance. The There are unremarkable appearance for the uterus and ovaries are. The bladder has a Kaufman catheter. Degenerative changes are seen in the lumbar spine. Old compression deformity seen in L1 vertebral body. 1. No acute inflammatory changes omental mesenteric fat planes free intraperitoneal air, bowel obstruction or ascites. . 2. Fluid contents in the colon can indicate impending diarrhea-like condition. Xr Chest Portable    Result Date: 3/14/2020  Patient MRN: [id number] : 1950 Age:  68 years Gender: Female Order Date: 3/13/2020 9:45 PM Exam: XR CHEST PORTABLE Number of Images: 1 view Indication:   SOB SOB Comparison: None. Findings: The heart is enlarged. The lung fields demonstrate evidence for airspace disease. The aorta is tortuous and ectatic. Tortuous ectatic aorta Cardiomegaly Airspace disease compatible with pneumonia, at the right lung base       Discharge Exam:    HEENT: NCAT,  PERRLA, No JVD  Heart:  RRR, no murmurs, gallops, or rubs.   Lungs:  CTA bilaterally, no wheeze, rales or rhonchi  Abd: bowel sounds present, nontender, nondistended, no masses  Extrem:  No clubbing, cyanosis, or edema    Disposition: [facility]     Patient Condition at Discharge: stable     Patient Instructions:      Medication List      START taking these medications    amLODIPine 10 MG tablet  Commonly known as:  NORVASC  Take 1 tablet by mouth daily  Start taking on:  2020     bumetanide 2 MG tablet  Commonly known as:  BUMEX  Take 1 tablet by mouth daily  Start taking on:  2020     famotidine 20 MG tablet  Commonly known as:  PEPCID  Take 1 tablet by mouth daily     ferrous sulfate 325 (65 Fe) MG tablet  Commonly known as:  IRON 325  Take 1 tablet by mouth 2 times daily (with meals)     hydrALAZINE 25 MG tablet  Commonly known as:  APRESOLINE  Take 3 tablets by mouth every 8 hours     vitamin D 1.25 MG (34575 UT) Caps capsule  Commonly known as:  ERGOCALCIFEROL  Take 1 capsule by mouth once a week  Start taking on:  2020        CONTINUE taking these medications    metoprolol succinate 100 MG extended release tablet  Commonly known as:  TOPROL XL     potassium chloride 10 MEQ extended release capsule  Commonly known as:  MICRO-K     potassium citrate 10 MEQ (1080 MG) extended release tablet  Commonly known as:  UROCIT-K     triamcinolone 0.025 %

## 2020-03-25 NOTE — PROGRESS NOTES
Department of Internal Medicine  Nephrology Attending Consult Note    Events reviewed. Patient seen on dialysis. SUBJECTIVE: We are following Mrs. Tim Martel for KAYLA. Reports no complaints.        PHYSICAL EXAM:      Vitals:    VITALS:  BP (!) 155/74   Pulse 70   Temp 97.9 °F (36.6 °C)   Resp 18   Ht 5' 1\" (1.549 m)   Wt 98 lb 1.7 oz (44.5 kg)   SpO2 97%   BMI 18.54 kg/m²   24HR INTAKE/OUTPUT:      Intake/Output Summary (Last 24 hours) at 3/25/2020 1005  Last data filed at 3/25/2020 3101  Gross per 24 hour   Intake 520 ml   Output 857 ml   Net -337 ml     Access: Right IJV tunneled dialysis catheter   Constitutional: Patient is awake alert she is on BiPAP  HEENT: Pupils are equal reactive, mucous membranes are very dry  Respiratory: Lungs are clear  Cardiovascular/Edema: Heart sounds are regular  Gastrointestinal: Abdomen soft  Neurologic: Nonfocal  Skin: No lesions  Other: No edema    Scheduled Meds:   vitamin D  50,000 Units Oral Weekly    hydrALAZINE  75 mg Oral 3 times per day    albuterol sulfate HFA  2 puff Inhalation Q6H    ipratropium  2 puff Inhalation Q6H    sodium chloride flush  10 mL Intravenous 2 times per day    [Held by provider] heparin (porcine)  5,000 Units Subcutaneous 3 times per day    epoetin pavan-epbx  3,000 Units Subcutaneous Once per day on Mon Wed Fri    metoprolol succinate  100 mg Oral Daily    amLODIPine  10 mg Oral Daily    ferrous sulfate  325 mg Oral BID WC    dextrose  25 g Intravenous Once     Continuous Infusions:   dextrose       PRN Meds:.hydrALAZINE, labetalol, midazolam, sodium chloride flush, acetaminophen **OR** acetaminophen, [DISCONTINUED] promethazine **OR** ondansetron, anticoagulant sodium citrate, glucagon (rDNA), glucose, dextrose, glucagon (rDNA), dextrose, perflutren lipid microspheres    DATA:    CBC:   Lab Results   Component Value Date    WBC 9.6 03/25/2020    RBC 2.91 03/25/2020    HGB 8.1 03/25/2020    HCT 26.7 03/25/2020    MCV 91.8 03/25/2020    MCH 27.8 03/25/2020    MCHC 30.3 03/25/2020    RDW 14.4 03/25/2020     03/25/2020    MPV 10.7 03/25/2020     CMP:    Lab Results   Component Value Date     03/25/2020    K 3.6 03/25/2020    K 4.4 03/18/2020    CL 95 03/25/2020    CO2 26 03/25/2020    BUN 13 03/25/2020    CREATININE 4.1 03/25/2020    GFRAA 13 03/25/2020    LABGLOM 11 03/25/2020    GLUCOSE 87 03/25/2020    PROT 4.8 03/25/2020    LABALBU 2.6 03/25/2020    CALCIUM 8.0 03/25/2020    BILITOT 0.3 03/25/2020    ALKPHOS 76 03/25/2020    AST 17 03/25/2020    ALT 39 03/25/2020     Magnesium:    Lab Results   Component Value Date    MG 1.9 03/25/2020     Phosphorus:    Lab Results   Component Value Date    PHOS 3.0 03/25/2020     Radiology Review:      CT Abdomen and pelvis 3/14/20   1. No acute inflammatory changes omental mesenteric fat planes free   intraperitoneal air, bowel obstruction or ascites. .       2. Fluid contents in the colon can indicate impending diarrhea-like   condition. CXR 3/21/20   1. Increased bibasilar opacities. Differential includes atelectasis,   infection, and/or layering pleural effusions.       2. Lines and tubes as detailed above.             BRIEF SUMMARY OF INITIAL CONSULT:    Briefly Mrs. Balaji Ann is a 25-year-old woman with history of HTN, COPD, alcohol abuse, who was admitted on March 13, 2020 transferred from [facility] where she initially presented with left leg weakness and was found to have creatinine level of 3.4 m/dL and a potassium level >7 mEq/L. In view of the lack of vascular surgeon available this week and the consideration of need of dialysis with possible need of dialysis catheter placement patient was transferred to this hospital.  Prior to admission patient reports having nausea vomiting and diarrhea. She denies taking NSAIDs. Her medications prior to admission included spironolactone and potassium supplementation.     Problems resolved:    · Hyperkalemia, multifactorial due to KAYLA, potassium intake and spironolactone. Potassium levels improved. · Right lung pneumonia, on ampicillin-sulbactam  · Shock liver, LFTs improved. · PAF, on sinus, on metoprolol,   · COPD exacerbation  · Alkalemia (PH: 7.461) with respiratory and metabolic alkalosis     IMPRESSION/RECOMMENDATIONS:      1. KAYLA stage III, ischemic ATN, started on HD on 3/15/2020, s/p right IJV tunneled dialysis catheter 3/17/2020. Urine output improved but creatinine still rising. Having HD today. 2. Hypocalcemia, 2/2 vitamin D deficiency  3. Hypomagnesemia, 2/2 to poor oral intake, corrected  4. Hypophosphatemia, 2/2 to vitamin D deficiency and poor oral intake, corrected   5. Vitamin D deficiency, on ergocalciferol   6. HFpEF 55-60%, stage II DD; pro-BNP 53,437  7.  HTN, on metoprolol, amlodipine, hydralazine     Plan:    · HD today  · Bumex 2 mg daily   · Ok to discharge from renal point of view

## 2020-03-25 NOTE — FLOWSHEET NOTE
03/25/20 1053   Vital Signs   BP (!) 147/68   Temp 98.2 °F (36.8 °C)   Pulse 79   Resp 18   Weight 93 lb 11.1 oz (42.5 kg)   Weight Method Bed scale   Percent Weight Change -4.49   Pain Assessment   Pain Assessment 0-10   Pain Level 0   Post-Hemodialysis Assessment   Post-Treatment Procedures Blood returned;Catheter capped, clamped with Citrate x 2 ports   Machine Disinfection Process Exterior Machine Disinfection   Rinseback Volume (ml) 300 ml   Total Liters Processed (l/min) 90.9 l/min   Dialyzer Clearance Lightly streaked   Duration of Treatment (minutes) 240 minutes   Heparin amount administered during treatment (units) 0 units   Hemodialysis Intake (ml) 300 ml   Hemodialysis Output (ml) 2300 ml   NET Removed (ml) 2000 ml   Tolerated Treatment Good   Patient Response to Treatment tolerated well, profile B, refill noted, 2L fluid removal   Bilateral Breath Sounds Diminished   Edema Right lower extremity; Left lower extremity   Physician Notified?  No

## 2020-03-25 NOTE — PROGRESS NOTES
Occupational Therapy  OT treatment attempted. Pt is currently off the unit in dialysis. Will check back at later time.   Audrey He, OTR/L

## 2020-03-25 NOTE — PROGRESS NOTES
hemoglobin falls below 7-currently 7.6  Resume home medications of metoprolol based on blood pressures-increase dose back to 100 twice daily with parameters  PRN hydralazine    ETOH withdrawls leading to AMS ?   Ammonia level unremarkable  Ativan as needed for withdrawls-not requiring CIWA protocol any longer    DVT Prophylaxis   PT/OT  Discharge planning-today     All consultants notes reviewed    Jennifer Riley MD  11:46 AM  3/25/2020

## 2020-03-25 NOTE — PLAN OF CARE
Problem: Falls - Risk of:  Goal: Will remain free from falls  Description: Will remain free from falls  Outcome: Met This Shift     Problem: Pain:  Goal: Pain level will decrease  Description: Pain level will decrease  3/24/2020 2240 by Yobany Briggs RN  Outcome: Met This Shift

## 2020-03-25 NOTE — PROGRESS NOTES
Physical Therapy  Physical Therapy Re-Evaluation  Name: Valdemar Kay  : 1950  MRN: [id number]    Referring Provider:  Jennifer Riley MD    Date of Service: 3/25/2020  Evaluation PT: Zaidiapril Weir, PT, DPT [id number]  Re-Evaluating PT:  Rubens Johnson, PT, DPT [id number]    Room #:  5889/6668-N  Diagnosis:  Hyperkalemia  PMHx/PSHx:  Initially no PMHx on file. During length stay pt was reported to have end stage COPD, acute renal failure with hyperkalemia, a-fib with tachycardia  Procedure/Surgery:  Esophagogastroduodenoscopy 3/15/2020, Insertion of right internal jugular vein tunneled hemodialysis catheter (33995), fluoroscopy (73848-17) 3/18/2020  Precautions:  Falls, hemodialysis central access R subclavian vein, O2, pierre  Equipment Needs:  Front [employer]    SUBJECTIVE:    Pt lives with son and daughter-in-law in a tri-level home with 4 stairs to enter and 1 rail. Bed is on first floor and bath is on first floor. Pt is poor historian regarding functional and social history. Initially stated she was independent and working PTA, but later stated she hasn't been able to work or walk d/t the progressing weakness and numbness in her BLE. Reason for re-evaluation:  Significant change in status since initial evaluation including intubation and extubation. OBJECTIVE:   Initial Evaluation  Date: 3/23/2020 Treatment  Date: 3/25/2020 Short Term/ Long Term   Goals   AM-PAC 6 Clicks 84/51 63/62    Was pt agreeable to Eval/treatment? yes yes    Does pt have pain? 8/10 \"soreness\" in BLE no obvious signs of pain or discomfort with ambulation however.  No c/o pain    Bed Mobility  Rolling: Rika  Supine to sit: Rika  Sit to supine: Rika  Scooting: Rika Rolling: SBA  Supine to sit: SBA  Sit to supine: SBA  Scooting: SBA Rolling: SBA  Supine to sit: SBA  Sit to supine: SBA  Scooting: SBA   Transfers Sit to stand: Rika  Stand to sit: Rika  Stand pivot: Rika front [employer] Rika front [employer] Sit to stand: SBA  Stand to sit: SBA  Stand pivot: SBA [facility]   Ambulation    25 feet with modA [facility] 45 feet x 3 front [facility] Rika 50 feet with [facility] Rika   Stair negotiation: ascended and descended  NT NT 4 steps with 1 rail Rika   ROM BUE:  Per OT note  BLE:  WNL     Strength BUE:  Per OT note  BLE:  Grossly 4-/5     Balance Sitting EOB:  SBA  Dynamic Standing:  modA [facility] Sitting EOB:  SBA  Dynamic Standing: Rika [facility] Sitting EOB:  Independent  Dynamic Standing:  Rika [facility]     Pt is A & O x 4  Sensation:  Pt denies numbness and tingling to extremities  Edema:  None    Therapeutic Exercises:  LAQ, marches, AP 10 reps x 2 sets of each. Patient education  Pt educated on safety awareness, energy conservation, proper hand placement and sequencing for bed mobility and transfers, PLB throughout activity to help with regulate HR and improve O2 Saturation. Patient response to education:   Pt verbalized understanding Pt demonstrated skill Pt requires further education in this area   y y y     ASSESSMENT:    Patient exhibits decreased strength, balance, coordination impairing functional mobility. Educated pt on techniques to improve safety and independence with bed mobility, transfers, balance, functional transfers, and functional mobility. Pt sat EOB for 10 minutes with SBA in order to improve static and dynamic sitting balance and activity tolerance. Pt ambulated with decreased jacqueline and stride length 45' x 3 with SW with Rika. Pt was slightly unsteady at times and demonstrated poor safety awareness with the [facility] and O2 line management during ambulation. Pt tolerated seated ther ex well but required instruction for technique. Pt demonstrated fair+ understanding of education/techniques requiring additional education/training as well as poor+ safety awareness. At end of session, pt was left in bed with call light in reach and all needs met.  Pt would benefit from continued skilled PT services to increase functional independence and overall quality of life. Treatment:    Pt given VC for hand placement and sequencing to address deficits for bed mobility, transfers, ambulation. Pt given VC for safety throughout session to reduce risk of falls. Pt was given instruction for sit >< stand transfers regarding weight shifting, sequencing, and proper foot/hand placement to achieve an effective stand. Pt was given VC to increase stride length and jacqueline to normalize gait mechanics and improve stability as well as instruction regarding heel to toe step through gait to improve dynamic standing balance. Adaptive device was adjusted to proper height for the patient, and pt was given VC for [facility] approximation during ambulation. Pt required instruction for technique for seated exercises. Pt's/ family goals   To improve strength, endurance, balance, improve breathing. Patient and or family understand(s) diagnosis, prognosis, and plan of care. PLAN:    PT care will be provided in accordance with the objectives noted above. Exercises and functional mobility practice will be used as well as appropriate assistive devices or modalities to obtain goals. Patient and family education will also be administered as needed. Frequency of treatments: 1x daily for 3-7x per week for 1-3 days.     Time in  1341  Time out  1412    (Evaluation time includes thorough review of current medical information, gathering information on past medical history/social history and prior level of function, completion of standardized testing/informal observation of tasks, assessment of data, and development of POC/Goals.)    Total Treatment Time  31 minutes    CPT codes:  [] PT Re-evaluation 25453  [] Gait training 69955   [] Manual therapy 38200   [x] Therapeutic activities 66386     21 minutes  [x] Therapeutic exercises 66222    10 minutes  [] Neuromuscular reeducation [facility], PT, DPT   [id number]

## 2020-03-25 NOTE — PROGRESS NOTES
[address] [facility]  Department of Internal Medicine  Division of Pulmonary, Critical Care and Sleep Medicine  Progress Note      Patient:  Carol Ruiz 65 y.o. female  MRN: [id number]     Date of Service: 3/25/2020    Allergy: Patient has no known allergies. Subjective     EGD normal  States has home oxygen but doesn't use it much  Ambulating with walker    Objective   I & O - 24hr:     Intake/Output Summary (Last 24 hours) at 3/25/2020 1059  Last data filed at 3/25/2020 0622  Gross per 24 hour   Intake 520 ml   Output 857 ml   Net -337 ml       Physical Exam:   · Vitals: BP (!) 168/74   Pulse 75   Temp 97.9 °F (36.6 °C)   Resp 18   Ht 5' 1\" (1.549 m)   Wt 98 lb 1.7 oz (44.5 kg)   SpO2 97%   BMI 18.54 kg/m²       Constitutional: Alert. Head: Normocephalic and atraumatic. Eyes: PERRL, (-) conjunctivitis, (-) scleral icterus. Mucus membranes moist.  Neck: (-)  swelling, (-) JVD   Respiratory: (+) bilateral  wheezes, (+)  rales, (+)  rhonchi. Cardiovascular: RRR. (-)  murmurs,   GI:  Abdomen soft, (-) tenderness (-) distension  (+) BS. (-)  rebound, (-) guarding, (-) rigidity. Extremities:  (-) clubbing, (-) cyanosis. 2+ distal pulses. (-) peripheral edema. Neurologic:  No focal neurological deficits.       Labs     CBC:   Lab Results   Component Value Date    WBC 9.6 03/25/2020    RBC 2.91 03/25/2020    HGB 8.1 03/25/2020    HCT 26.7 03/25/2020    MCV 91.8 03/25/2020    MCH 27.8 03/25/2020    MCHC 30.3 03/25/2020    RDW 14.4 03/25/2020     03/25/2020    MPV 10.7 03/25/2020     BMP:    Lab Results   Component Value Date     03/25/2020    K 3.6 03/25/2020    K 4.4 03/18/2020    CL 95 03/25/2020    CO2 26 03/25/2020    BUN 13 03/25/2020    LABALBU 2.6 03/25/2020    CREATININE 4.1 03/25/2020    CALCIUM 8.0 03/25/2020    GFRAA 13 03/25/2020    LABGLOM 11 03/25/2020    GLUCOSE 87 03/25/2020     Monitor :  Sinus with pauses occ runs of afib  CXR We reviewed the films during the inter-disciplinary rounds/conference, which showed lines and tubes ok and changes of COPD    ECHOCARDIOGRAM:   Normal left ventricular size and systolic function. Ejection fraction is visually estimated at 55-60%. Grade II diastolic dysfunction. No regional wall motion abnormalities seen. Mild left ventricular concentric hypertrophy noted. Abnormal LV septal motion consistent with conduction disorder. Normal right ventricular size and function. The left atrium is mildly dilated. No significant valvular abnormalities. Assessment and Plan   Shankar Lara is a 65 y.o. female who was brought to the MICU for 1. Atrial fibrillation with RVR, 2. Unable to use [address] in the setting of GIB, 3. Hypertensive emergency --> uncontrolled 691M systolic, 4. KAYLA - nephrology following  --> ESRD on HD now5. Hyperkalemia, 6. Acute respiratory failure on Bipap, 7. COPD with hx of tobacco use 8. Transaminitis, 9. Hx of ETOH abuse, 10. GIB - melanotic stools and positive FOBTw th negative EGD    IMPRESSION:  1. Acute metabolic encephalopathy of multiple etiology (resolved)  2. Atrial fibrillation with RVR -  Currently on cardizem gtt at 5 mg/hr  3. Unable to use [address] in the setting of GIB  4. Hypertensive emergency --> uncontrolled 386C systolic  5. KAYLA - nephrology following  --> ESRD on HD now  6. Hyperkalemia   7. COPD with hx of tobacco use  8. Transaminitis - Hx of ETOH abuse  9. GIB - melanotic stools and positive FOBT. EGD negative      10. Elevated BNP Heart failure HFpEF DD  11. Prolonged QTc Avoid QT prolonging meds  12. Pleural effusion most likely 2/2 CHF  13. Alcoholism  14. KAYLA- ATN on HD  15. Normocytic anemia - many reason  16. Alcoholic Has had history of withdrawal in the past    PLAN:  1. Got HD with marked improvement  2. PT OT  3. Coprrect Mg, K , and Phos  4. Discharge planning, ok to discharge per POV.    5. No follow up needed,            Odalys Giles

## 2020-03-26 VITALS
TEMPERATURE: 99.3 F | SYSTOLIC BLOOD PRESSURE: 140 MMHG | BODY MASS INDEX: 17.58 KG/M2 | OXYGEN SATURATION: 97 % | RESPIRATION RATE: 14 BRPM | WEIGHT: 93.13 LBS | HEIGHT: 61 IN | HEART RATE: 74 BPM | DIASTOLIC BLOOD PRESSURE: 65 MMHG

## 2020-03-26 LAB
ALBUMIN SERPL-MCNC: 2.8 G/DL (ref 3.5–5.2)
ALP BLD-CCNC: 77 U/L (ref 35–104)
ALT SERPL-CCNC: 33 U/L (ref 0–32)
ANION GAP SERPL CALCULATED.3IONS-SCNC: 9 MMOL/L (ref 7–16)
AST SERPL-CCNC: 17 U/L (ref 0–31)
BASOPHILS ABSOLUTE: 0.02 E9/L (ref 0–0.2)
BASOPHILS RELATIVE PERCENT: 0.3 % (ref 0–2)
BILIRUB SERPL-MCNC: 0.3 MG/DL (ref 0–1.2)
BUN BLDV-MCNC: 8 MG/DL (ref 8–23)
CALCIUM SERPL-MCNC: 7.9 MG/DL (ref 8.6–10.2)
CHLORIDE BLD-SCNC: 99 MMOL/L (ref 98–107)
CO2: 29 MMOL/L (ref 22–29)
CREAT SERPL-MCNC: 2.5 MG/DL (ref 0.5–1)
EOSINOPHILS ABSOLUTE: 0 E9/L (ref 0.05–0.5)
EOSINOPHILS RELATIVE PERCENT: 0 % (ref 0–6)
GFR AFRICAN AMERICAN: 23
GFR NON-AFRICAN AMERICAN: 19 ML/MIN/1.73
GLUCOSE BLD-MCNC: 89 MG/DL (ref 74–99)
HCT VFR BLD CALC: 27 % (ref 34–48)
HEMOGLOBIN: 8.4 G/DL (ref 11.5–15.5)
IMMATURE GRANULOCYTES #: 0.06 E9/L
IMMATURE GRANULOCYTES %: 0.9 % (ref 0–5)
LYMPHOCYTES ABSOLUTE: 1.12 E9/L (ref 1.5–4)
LYMPHOCYTES RELATIVE PERCENT: 17.3 % (ref 20–42)
MAGNESIUM: 1.7 MG/DL (ref 1.6–2.6)
MCH RBC QN AUTO: 29 PG (ref 26–35)
MCHC RBC AUTO-ENTMCNC: 31.1 % (ref 32–34.5)
MCV RBC AUTO: 93.1 FL (ref 80–99.9)
MONOCYTES ABSOLUTE: 0.82 E9/L (ref 0.1–0.95)
MONOCYTES RELATIVE PERCENT: 12.7 % (ref 2–12)
NEUTROPHILS ABSOLUTE: 4.45 E9/L (ref 1.8–7.3)
NEUTROPHILS RELATIVE PERCENT: 68.8 % (ref 43–80)
PDW BLD-RTO: 14.5 FL (ref 11.5–15)
PHOSPHORUS: 1.9 MG/DL (ref 2.5–4.5)
PLATELET # BLD: 160 E9/L (ref 130–450)
PMV BLD AUTO: 10.5 FL (ref 7–12)
POTASSIUM SERPL-SCNC: 4 MMOL/L (ref 3.5–5)
RBC # BLD: 2.9 E12/L (ref 3.5–5.5)
SODIUM BLD-SCNC: 137 MMOL/L (ref 132–146)
TOTAL PROTEIN: 5 G/DL (ref 6.4–8.3)
WBC # BLD: 6.5 E9/L (ref 4.5–11.5)

## 2020-03-26 PROCEDURE — 94660 CPAP INITIATION&MGMT: CPT

## 2020-03-26 PROCEDURE — 85025 COMPLETE CBC W/AUTO DIFF WBC: CPT

## 2020-03-26 PROCEDURE — 6370000000 HC RX 637 (ALT 250 FOR IP): Performed by: INTERNAL MEDICINE

## 2020-03-26 PROCEDURE — 2500000003 HC RX 250 WO HCPCS: Performed by: INTERNAL MEDICINE

## 2020-03-26 PROCEDURE — 83735 ASSAY OF MAGNESIUM: CPT

## 2020-03-26 PROCEDURE — 2700000000 HC OXYGEN THERAPY PER DAY

## 2020-03-26 PROCEDURE — 2580000003 HC RX 258: Performed by: INTERNAL MEDICINE

## 2020-03-26 PROCEDURE — 99232 SBSQ HOSP IP/OBS MODERATE 35: CPT | Performed by: INTERNAL MEDICINE

## 2020-03-26 PROCEDURE — 80053 COMPREHEN METABOLIC PANEL: CPT

## 2020-03-26 PROCEDURE — 84100 ASSAY OF PHOSPHORUS: CPT

## 2020-03-26 RX ORDER — HYDRALAZINE HYDROCHLORIDE 25 MG/1
75 TABLET, FILM COATED ORAL EVERY 8 HOURS SCHEDULED
Qty: 90 TABLET | Refills: 3 | Status: SHIPPED | OUTPATIENT
Start: 2020-03-26

## 2020-03-26 RX ORDER — BUMETANIDE 2 MG/1
2 TABLET ORAL DAILY
Qty: 30 TABLET | Refills: 3 | Status: SHIPPED | OUTPATIENT
Start: 2020-03-27

## 2020-03-26 RX ORDER — ERGOCALCIFEROL 1.25 MG/1
50000 CAPSULE ORAL WEEKLY
Qty: 5 CAPSULE | Refills: 0 | Status: SHIPPED | OUTPATIENT
Start: 2020-03-31

## 2020-03-26 RX ORDER — AMLODIPINE BESYLATE 10 MG/1
10 TABLET ORAL DAILY
Qty: 30 TABLET | Refills: 3 | Status: SHIPPED | OUTPATIENT
Start: 2020-03-27

## 2020-03-26 RX ORDER — FERROUS SULFATE 325(65) MG
325 TABLET ORAL 2 TIMES DAILY WITH MEALS
Qty: 30 TABLET | Refills: 3 | Status: SHIPPED | OUTPATIENT
Start: 2020-03-26

## 2020-03-26 RX ADMIN — SODIUM CHLORIDE, PRESERVATIVE FREE 10 ML: 5 INJECTION INTRAVENOUS at 11:27

## 2020-03-26 RX ADMIN — METOPROLOL SUCCINATE 100 MG: 100 TABLET, FILM COATED, EXTENDED RELEASE ORAL at 09:12

## 2020-03-26 RX ADMIN — FERROUS SULFATE TAB 325 MG (65 MG ELEMENTAL FE) 325 MG: 325 (65 FE) TAB at 09:12

## 2020-03-26 RX ADMIN — ALBUTEROL SULFATE 2 PUFF: 90 AEROSOL, METERED RESPIRATORY (INHALATION) at 05:16

## 2020-03-26 RX ADMIN — HYDRALAZINE HYDROCHLORIDE 75 MG: 25 TABLET, FILM COATED ORAL at 05:15

## 2020-03-26 RX ADMIN — SODIUM PHOSPHATE, MONOBASIC, MONOHYDRATE 10 MMOL: 276; 142 INJECTION, SOLUTION INTRAVENOUS at 11:27

## 2020-03-26 RX ADMIN — ALBUTEROL SULFATE 2 PUFF: 90 AEROSOL, METERED RESPIRATORY (INHALATION) at 00:36

## 2020-03-26 RX ADMIN — BUMETANIDE 2 MG: 1 TABLET ORAL at 09:12

## 2020-03-26 RX ADMIN — IPRATROPIUM BROMIDE 2 PUFF: 17 AEROSOL, METERED RESPIRATORY (INHALATION) at 00:36

## 2020-03-26 RX ADMIN — IPRATROPIUM BROMIDE 2 PUFF: 17 AEROSOL, METERED RESPIRATORY (INHALATION) at 05:17

## 2020-03-26 RX ADMIN — Medication 10 ML: at 09:12

## 2020-03-26 RX ADMIN — AMLODIPINE BESYLATE 10 MG: 10 TABLET ORAL at 09:12

## 2020-03-26 ASSESSMENT — PAIN SCALES - GENERAL: PAINLEVEL_OUTOF10: 0

## 2020-03-26 NOTE — PLAN OF CARE
Problem: Falls - Risk of:  Goal: Will remain free from falls  Description: Will remain free from falls  Outcome: Met This Shift     Problem: Breathing Pattern - Ineffective:  Goal: Ability to achieve and maintain a regular respiratory rate will improve  Description: Ability to achieve and maintain a regular respiratory rate will improve  Outcome: Met This Shift     Problem: Pain:  Goal: Control of acute pain  Description: Control of acute pain  Outcome: Met This Shift

## 2020-03-26 NOTE — CARE COORDINATION
SOCIAL WORK/CASEMANAGEMENT TRANSITION OF CARE PLANNING: precert obtained for pt to go to [facility] today. Sent perfect serve to pcp to complete all discharge paperwork. Snf;loc. Pt is in agreement. bipap settings provided to jasmine. They will send [facility] transport with 2l o2 for 1 p.m. I sent a perfect serve to dr. Connie Mejia to see if pt can wait until Saturday for her first outpatient dialysis treatment since she had a treatment yesterday and he said it was ok. Ezequiel Kennedy the  saw the perfect serve response as well from dr. Jade Smith. Pt is setup with [facility] for tue, thurs, sat, at 12:20 p.m. and is to be there 30 minutes for first treatment. Facility is aware of all of this and it was placed on ky. I called [facility] to inform them of new start date of 3/28/20. I called aida the domestic partner and told him of the discharge plan.  Gretel Sher  3/26/2020

## 2020-03-26 NOTE — PROGRESS NOTES
Nurse to Nurse called to [facility], [place] prior to discharge. AVS faxed as well.

## 2020-03-26 NOTE — PROGRESS NOTES
Department of Internal Medicine  Nephrology Attending Consult Note    Events reviewed. SUBJECTIVE: We are following Mrs. Nicole Murrieta for KAYLA. Reports no complaints.        PHYSICAL EXAM:      Vitals:    VITALS:  BP (!) 140/65   Pulse 74   Temp 99.3 °F (37.4 °C) (Temporal)   Resp 14   Ht 5' 1\" (1.549 m)   Wt 93 lb 2 oz (42.2 kg)   SpO2 97%   BMI 17.60 kg/m²   24HR INTAKE/OUTPUT:      Intake/Output Summary (Last 24 hours) at 3/26/2020 0950  Last data filed at 3/26/2020 7042  Gross per 24 hour   Intake 840 ml   Output 2400 ml   Net -1560 ml     Access: Right IJV tunneled dialysis catheter   Constitutional: Patient is awake alert she is on BiPAP  HEENT: Pupils are equal reactive, mucous membranes are very dry  Respiratory: Lungs are clear  Cardiovascular/Edema: Heart sounds are regular  Gastrointestinal: Abdomen soft  Neurologic: Nonfocal  Skin: No lesions  Other: No edema    Scheduled Meds:   bumetanide  2 mg Oral Daily    amLODIPine  10 mg Oral Daily    vitamin D  50,000 Units Oral Weekly    hydrALAZINE  75 mg Oral 3 times per day    albuterol sulfate HFA  2 puff Inhalation Q6H    ipratropium  2 puff Inhalation Q6H    sodium chloride flush  10 mL Intravenous 2 times per day    [Held by provider] heparin (porcine)  5,000 Units Subcutaneous 3 times per day    epoetin pavan-epbx  3,000 Units Subcutaneous Once per day on Mon Wed Fri    metoprolol succinate  100 mg Oral Daily    ferrous sulfate  325 mg Oral BID WC    dextrose  25 g Intravenous Once     Continuous Infusions:   dextrose       PRN Meds:.hydrALAZINE, labetalol, midazolam, sodium chloride flush, acetaminophen **OR** acetaminophen, [DISCONTINUED] promethazine **OR** ondansetron, anticoagulant sodium citrate, glucagon (rDNA), glucose, dextrose, glucagon (rDNA), dextrose, perflutren lipid microspheres    DATA:    CBC:   Lab Results   Component Value Date    WBC 6.5 03/26/2020    RBC 2.90 03/26/2020    HGB 8.4 03/26/2020    HCT 27.0 03/26/2020

## 2020-03-26 NOTE — PROGRESS NOTES
[address]  Department of Internal Medicine  Division of Pulmonary, Critical Care and Sleep Medicine  Progress Note      Patient:  Chance Singh 65 y.o. female  MRN: [id number]     Date of Service: 3/26/2020    Allergy: Patient has no known allergies. Subjective     EGD normal  States has home oxygen but doesn't use it much  Ambulating with walker    Objective   I & O - 24hr:     Intake/Output Summary (Last 24 hours) at 3/26/2020 1103  Last data filed at 3/26/2020 0817  Gross per 24 hour   Intake 540 ml   Output 100 ml   Net 440 ml       Physical Exam:   · Vitals: BP (!) 140/65   Pulse 74   Temp 99.3 °F (37.4 °C) (Temporal)   Resp 14   Ht 5' 1\" (1.549 m)   Wt 93 lb 2 oz (42.2 kg)   SpO2 97%   BMI 17.60 kg/m²       Constitutional: Alert. Head: Normocephalic and atraumatic. Eyes: PERRL, (-) conjunctivitis, (-) scleral icterus. Mucus membranes moist.  Neck: (-)  swelling, (-) JVD   Respiratory: (+) bilateral  wheezes, (+)  rales, (+)  rhonchi. Cardiovascular: RRR. (-)  murmurs,   GI:  Abdomen soft, (-) tenderness (-) distension  (+) BS. (-)  rebound, (-) guarding, (-) rigidity. Extremities:  (-) clubbing, (-) cyanosis. 2+ distal pulses. (-) peripheral edema. Neurologic:  No focal neurological deficits.       Labs     CBC:   Lab Results   Component Value Date    WBC 6.5 03/26/2020    RBC 2.90 03/26/2020    HGB 8.4 03/26/2020    HCT 27.0 03/26/2020    MCV 93.1 03/26/2020    MCH 29.0 03/26/2020    MCHC 31.1 03/26/2020    RDW 14.5 03/26/2020     03/26/2020    MPV 10.5 03/26/2020     BMP:    Lab Results   Component Value Date     03/26/2020    K 4.0 03/26/2020    K 4.4 03/18/2020    CL 99 03/26/2020    CO2 29 03/26/2020    BUN 8 03/26/2020    LABALBU 2.8 03/26/2020    CREATININE 2.5 03/26/2020    CALCIUM 7.9 03/26/2020    GFRAA 23 03/26/2020    LABGLOM 19 03/26/2020    GLUCOSE 89 03/26/2020     Monitor :  Sinus with pauses occ runs of afib  CXR We reviewed the films during the inter-disciplinary rounds/conference, which showed lines and tubes ok and changes of COPD    ECHOCARDIOGRAM:   Normal left ventricular size and systolic function. Ejection fraction is visually estimated at 55-60%. Grade II diastolic dysfunction. No regional wall motion abnormalities seen. Mild left ventricular concentric hypertrophy noted. Abnormal LV septal motion consistent with conduction disorder. Normal right ventricular size and function. The left atrium is mildly dilated. No significant valvular abnormalities. Assessment and Plan   George Gibson is a 65 y.o. female who was brought to the MICU for 1. Atrial fibrillation with RVR, 2. Unable to use [address] in the setting of GIB, 3. Hypertensive emergency --> uncontrolled 256F systolic, 4. KAYLA - nephrology following  --> ESRD on HD now5. Hyperkalemia, 6. Acute respiratory failure on Bipap, 7. COPD with hx of tobacco use 8. Transaminitis, 9. Hx of ETOH abuse, 10. GIB - melanotic stools and positive FOBTw th negative EGD    IMPRESSION:  1. Acute metabolic encephalopathy of multiple etiology (resolved)  2. Atrial fibrillation with RVR -  Currently on cardizem gtt at 5 mg/hr  3. Unable to use [address] in the setting of GIB  4. Hypertensive emergency --> uncontrolled 000G systolic  5. KYALA - nephrology following  --> ESRD on HD now  6. Hyperkalemia   7. COPD with hx of tobacco use  8. Transaminitis - Hx of ETOH abuse  9. GIB - melanotic stools and positive FOBT. EGD negative      10. Elevated BNP Heart failure HFpEF DD  11. Prolonged QTc Avoid QT prolonging meds  12. Pleural effusion most likely 2/2 CHF  13. Alcoholism  14. KAYLA- ATN on HD  15. Normocytic anemia - many reason  16. Alcoholic Has had history of withdrawal in the past    PLAN:    1. Discharge planning, ok to discharge per POV.    2. No follow up needed,            Sourav Fisher DO, MPH  Professor of Internal Medicine

## 2020-03-31 NOTE — ADT AUTH CERT
Systemic or Infectious Condition GRG - Care Day 13 (3/25/2020) by Triny Riojas RN   Review Status Review Entered   Completed 3/31/2020 07:35     Criteria Review        Care Day: 13 Care Date: 3/25/2020 Level of Care:      Guideline Day 3      Level Of Care      ( ) * Activity level acceptable      Clinical Status      (X) * Hemodynamic stability      3/31/2020 7:35 AM EDT by Swati Sotelo      P 72; /74      ( ) * Respiratory status acceptable      3/31/2020 7:35 AM EDT by Swati Sotelo      RR 18; PULSE OX 95 % 2 L NC      ( ) * Neurologic status acceptable      (X) * Temperature status acceptable      3/31/2020 7:35 AM EDT by Gladys Cardenas 99.1      ( ) * No infection, or status acceptable      ( ) * No neutropenia, or status acceptable      ( ) * Special infection or injury situations absent      ( ) * Electrolyte status acceptable      ( ) * General Discharge Criteria met      Interventions      ( ) * Intake acceptable      (X) * No inpatient interventions needed      3/31/2020 7:35 AM EDT by Swati Sotelo      TRANSITIONED TO ORAL MEDS. * Milestone     Additional Notes   3/25/2020   LABS:   3/25/2020 05:25   Chloride: 95 (L)   Creatinine: 4.1 (H)   Calcium: 8.0 (L)   Total Protein: 4.8 (L)   Albumin: 2.6 (L)   ALT: 39 (H)   RBC: 2.91 (L)   Hemoglobin Quant: 8.1 (L)   Hematocrit: 26.7 (L)   Lymphocyte %: 11.7 (L)   Neutrophils Absolute: 7.32 (H)   Lymphocytes Absolute: 1.12 (L)   Monocytes Absolute: 1.01 (H)   Eosinophils Absolute: 0.00 (L)        MEDICATIONS: ALBUTEROL SULFATE IN Q6H, NORVASC DAILY, BUMEX PO DAILY, RETACRIT ONCE PER DAY ON MON WED FRI, FERROUS SULFATE 2 TIMES DAILY WITH MEALS, APRESOLINE PO 3 TIMES PER DAY, ATROVENT IN Q6H, TOPROL XL DAILY        ** ** NEPHROLOGY ** *    Plan:      · HD today   · Bumex 2 mg daily    · Ok to discharge from renal point of view        ** ** PULMONARY DISEASE ** *      PLAN:   1. Got HD with marked improvement   2. PT OT   3.  Coprrect Mg, K , and Phos   4. Discharge planning, ok to discharge per POV. 5. No follow up needed,       ** ** INTERNAL MEDICINE ** **    Plan:      Admit to medical intensive care unit for multiple issues   Transferred out of icu 3/16- RRT called for AMS and pt pulling out HD catheter - profuse bleeding with hypotension - transferred back to icu and intubated for airway protection   Out of icu 3/21         Acute anemia after pulling out HD catheter    Status post EGD-grossly normal,    serial H&H's-stable ~8.5   S/p placement of tessio   General surgery following      Hyperkalemia with acute renal failure   Offending agents in the form of Aldactone/oral potassium supplements discontinued   Ongoing hemodialysis   Status post Tessio 3/18/2020    BUN/creatinine remains improved with ongoing dialysis   renal following         Accelerated hypertension-   Still markedly elevated in the 18o to 200 range   Transfuse if hemoglobin falls below 7-currently 7.6   Resume home medications of metoprolol based on blood pressures-increase dose back to 100 twice daily with parameters   PRN hydralazine      ETOH withdrawls leading to AMS ?    Ammonia level unremarkable   Ativan as needed for withdrawls-not requiring CIWA protocol any longer      Systemic or Infectious Condition GRG - Care Day 12 (3/24/2020) by Awa Pedraza RN   Review Status Review Entered   Completed 3/31/2020 07:32     Criteria Review        Care Day: 12 Care Date: 3/24/2020 Level of Care:      Guideline Day 3      Level Of Care      ( ) * Activity level acceptable      Clinical Status      (X) * Hemodynamic stability      3/31/2020 7:32 AM EDT by Tressa Ceron      P 82; /0 (OVER PALP)      ( ) * Respiratory status acceptable      3/31/2020 7:32 AM EDT by Tressa Ceron      RR 24, PT IS ON 3 L NC AND WEANED TO 2 L NC      ( ) * Neurologic status acceptable      (X) * Temperature status acceptable      3/31/2020 7:32 AM EDT by Eugene Valdes 99.3      ( ) * No infection, P 90; /94      ( ) * Respiratory status acceptable      3/31/2020 7:29 AM EDT by Genoveva Mesa      RR 20, PT IS STILL ON NC 3 L AT 98 %      ( ) * Neurologic status acceptable      (X) * Temperature status acceptable      3/31/2020 7:29 AM EDT by Latosha Alexandra 99.4      ( ) * No infection, or status acceptable      ( ) * No neutropenia, or status acceptable      ( ) * Special infection or injury situations absent      ( ) * Electrolyte status acceptable      ( ) * General Discharge Criteria met      Interventions      ( ) * Intake acceptable      ( ) * No inpatient interventions needed      3/31/2020 7:29 AM EDT by Blair Gunn, LABETALOL IV      * Milestone     Additional Notes   3/23/2020   LABS:   3/23/2020 05:30   Creatinine: 4.4 (H)   Calcium: 8.1 (L)   Total Protein: 5.0 (L)   Albumin: 2.7 (L)   ALT: 53 (H)   RBC: 2.91 (L)   Hemoglobin Quant: 8.3 (L)   Hematocrit: 27.3 (L)   Lymphocyte %: 10.4 (L)   Neutrophils Absolute: 7.87 (H)   Lymphocytes Absolute: 1.06 (L)   Monocytes Absolute: 1.01 (H)   Eosinophils Absolute: 0.00 (L)        METER GLUCOSE: 116        ** ** NEPHROLOGY ** **    Plan:      · HD x 4 hours today    · Continue to monitor renal function        ** ** INTERNAL MEDICINE ** **    Plan:      Admit to medical intensive care unit for multiple issues   Transferred out of icu 3/16- RRT called for AMS and pt pulling out HD catheter - profuse bleeding with hypotension - transferred back to icu and intubated for airway protection   Out of icu 3/21         Acute anemia after pulling out HD catheter    Status post EGD-grossly normal,    serial H&H's-stable ~8.5   S/p placement of tessio   General surgery following      Hyperkalemia with acute renal failure   Offending agents in the form of Aldactone/oral potassium supplements discontinued   Ongoing hemodialysis   Status post Tessio 3/18/2020    BUN/creatinine remains improved with ongoing dialysis   renal following 2 TIMES DAILY WITH MEALS, FOLIC ACID IV DAILY, APRESOLINE PO 3 TIMES PER DAY, ATROVENT IN Q6H, DUONEB Q4H WA, TOPROL XL DAILY (HELD), LOPRESSOR 2 TIMES DAILY, PROTONIX IV DAILY, POTASSIUM AND SODIUM PHOSPHATES 3 TIMES DAILY, THIAMINE IV DAILY,APRESOLINE IV PRN , LABETALOL IV PRN X1        ** ** CRITICAL CARE ** **    Neurology   AAOx3   Following commands      Acute Encephalopathy 2/2 Alcohol withdrawal   Unremrkable CT head, normal ammonia    Previous workup unremarkable. Mechanical Fall    Right hip x-ray not remarkable for fractures. Cardiovascular      Paroxysmal atrial fibrillation   CHADSsVASc score is 3, holding anticoagulation. Hemorrhagic shock secondary to blood losses from the site of femoral HD line (likely secondary to malfunctioning platelets due to uremia, compensated liver disease)   Stable hemoglobin    Coagulation studies unremarkable. Upper Endoscopy unremarkable 3/15/2020)   Abnormal platelet function. Possible von Willebrand disease. Pulmonary      Acute hypoxic respiratory failure secondary to aspiration pneumonia   Continue controlling the source, on Unasyn   On nasal canula 2 L   off prednisone      Renal      KAYLA stage III on CKD (ischemic ATN). Nephrology is following, possible placement of either a temporary femoral HD catheter versus a    Right IJ tunneled catheter placed patient and dialysis was 1.9 removed    Creatinine improving      Infectious Disease   As above. Left Femoral atrial Thrombus. Venous ultrasound and arterial US are unremarkable. ** ** NEPHROLOGY ** **    Plan:      Labs and CXR results reviewed   No need for dialysis today   Add K phos 1 pack TID for 6 doses   Increase Hydralazine to 75 mg PO TID   Continue with Epogen 3000 units subcu 3 times a week             ** ** PULMONOLOGY ** *    PLAN:   1. PT OT   2. Discharge planning    3. VOlume & Electroplytes per renal   4. Wean off oxygen if able   5.  Change to MDI       ** ** INTERNAL MEDICINE ** **       Plan:      Admit to medical intensive care unit for multiple issues   Transferred out of icu 3/16- RRT called for AMS and pt pulling out HD catheter - profuse bleeding with hypotension - transferred back to icu and intubated for airway protection- await extubation today as patient is alert    Status post hemodialysis catheter placement      Acute anemia after pulling out HD catheter    Status post EGD-grossly normal,    serial H&H's-stable ~8.5   S/p placement of tessio   General surgery following      Hyperkalemia with acute renal failure   Offending agents in the form of Aldactone/oral potassium supplements discontinued   Ongoing hemodialysis   Status post Tessio 3/18/2020    BUN/creatinine remains improved with ongoing dialysis      Accelerated hypertension-   improved on current regimen   Transfuse if hemoglobin falls below 7-currently 7.6   Resume home medications of metoprolol based on blood pressures-increase dose back to 100 twice daily with parameters   PRN hydralazine      ETOH withdrawls leading to AMS ?    Ammonia level    Ativan as needed for withdrawls                 Systemic or Infectious Condition GRG - Care Day 8 (3/20/2020) by Rosangela Kat RN   Review Status Review Entered   Completed 3/31/2020 07:22     Criteria Review        Care Day: 8 Care Date: 3/20/2020 Level of Care:      Guideline Day 2      Clinical Status      ( ) * No ICU or intermediate care needs      3/31/2020 7:22 AM EDT by Marylen Rosette: T 97.5; P 69; RR 20; /44; PULSE OX 99 % VENTILATOR THEN WEANED TO 3 L NC      Interventions      (X) Inpatient interventions continue      3/31/2020 7:22 AM EDT by Marine Srinivasan, FOLIC ACID IV, PROTONIX IV, THIAMINE IV, PROPOFOL IV TITRATED, VERSED IV      * Milestone     Additional Notes   3/20/2020   LABS:    3/20/2020 04:25   CO2: 20 (L)   BUN: 25 (H)   Creatinine: 3.4 (H)   Anion Gap: 20 (H)   Calcium: 8.2 (L)   Total Protein: 4.8 (L)   Albumin: 2.9 (L)   ALT: 89 (H)   RBC: 2.68 (L)   Hemoglobin Quant: 7.8 (L)   Hematocrit: 24.8 (L)   Platelet Count: 98 (L)   Platelet Confirmation: CONFIRMED   Neutrophils %: 80.2 (H)   Lymphocyte %: 7.1 (L)   Neutrophils Absolute: 8.53 (H)   Lymphocytes Absolute: 0.75 (L)   Monocytes Absolute: 1.19 (H)   Eosinophils Absolute: 0.00 (L)        3/20/2020 04:38   PCO2: 34.0 (L)   pO2: 115.4 (H)   tHb (est): 8.8 (L)        3/20/2020 10:15   Procalcitonin: 0.91 (H)        3/20/2020 15:20   Hemoglobin Quant: 9.2 (L)   Hematocrit: 28.9 (L)        CHEST XRAY:1. Stable support tubes. 2. Persistent bilateral lung infiltrates without an obvious change. 3. Increasing moderately large left pleural effusion. 4. Only a very small right pleural effusion. MEDICATIONS: NORVASC DAILY, UNASYN IV Q24H, PULMICORT NEB 2 TIMES DAILY, PERIDEX MT 2 TIMES DAILY, RETACRIT ONCE PER DAY ON MON WED FRI, FERROUS SULFATE PO 2 TIMES DAILY WITH MEALS (HELD), FOLIC ACID IV DAILY, APRESOLINE PO 3 TIMES PER DAY, DUONEB Q4H WA, LOPRESSOR NG TUBE 2 TIMES DAILY, PROTONIX IV DAILY, THIAMINE IV DAILY, PROPOFOL IV TITRATED, VERSED IV X1        ** ** CRITICAL CARE ** **    Assessment:      Tunneled HD placed. 1 unit pRBC. Removed some fluid yesterday. 1. Acute hypoxic resp failure   2. Aspiration pna   3. ETOH withdrawl   4. KAYLA on HD   5. Hemorrhagic shock From fem site - resolve   6. p.afib      Possible extubation   Add home BP meds and prns        ** ** NEPHROLOGY ** **    IMPRESSION/RECOMMENDATIONS:       1. KAYLA stage III, established ischemic ATN, Started on RRT, 3/15. Remains anuric, for HD x 4 hours today. 2. High anion gap metabolic acidosis (renal failure), improving   3. Hypocalcemia 2/2 vitamin D deficiency, to start ergocalciferol   4. HTN, on metoprolol   5. New onset AF, with RVR, on metoprolol, diltiazem drip, unable to anticoagulate ? GIB   6.  Right lung pneumonia, procalcitonin 0.70, on ampicillin-sulbactam 7. Shock liver, LFTs continue to improve   8. Thrombocytopenia   9. COPD, methylprednisolone   10. Anemia, +FOBT, s/p EGD which was normal         Plan:      Hemodialysis today, 1 to 2 kg volume off as tolerated, orders reviewed with the dialysis nurse   CXR results reviewed, shows much improvement in pulmonary edema   Continue with Epogen 3000 units subcu 3 times a week        ** ** INTERNAL MEDICINE ** **    Plan:      Admit to medical intensive care unit for multiple issues   Transferred out of icu 3/16- RRT called for AMS and pt pulling out HD catheter - profuse bleeding with hypotension - transferred back to icu and intubated for airway protection- await extubation today as patient is alert    Status post hemodialysis catheter placement      Acute anemia after pulling out HD catheter    Status post EGD-grossly normal,    serial H&H's-stable   For placement of tessio   General surgery following      Hyperkalemia with acute renal failure   Offending agents in the form of Aldactone/oral potassium supplements discontinued   Ongoing hemodialysis   Status post Tessio 3/18/2020    BUN/creatinine remains improved with ongoing dialysis      Accelerated hypertension-i   improved on current regimen   Transfuse if hemoglobin falls below 7-currently 7.6      ETOH withdrawls leading to AMS ? Ammonia level    Ativan as needed for withdrawls         ** ** PULMONOLOGY ** **    PLAN:   1. Folate B12 within normal limits   2. Has had history of withdrawal in the past   3. Given age [de-identified] not in place   3. Wean ventilatory    5. VOlume per renal   6. Hb stable    7.  Will most likely need Precedex if patient becomes symptomatic use benzodiazepines sparingly   Systemic or Infectious Condition GRG - Care Day 7 (3/19/2020) by Ramesh Worthy RN   Review Status Review Entered   Completed 3/31/2020 07:19     Criteria Review        Care Day: 7 Care Date: 3/19/2020 Level of Care:      Guideline Day 2      Clinical Status      ( ) * No Tunneled HD placed. 1 unit pRBC. 1. Acute hypoxic resp failure   2. Aspiration pna   3. ETOH withdrawl   4. KAYLA on HD   5. Hemorrhagic shock From fem site - resolve   6. p.afib      D/C pred   Possible extubation        ** ** VASCULAR ** **    IMPRESSION: POD # 1 s/p tunneled HD line insertion      PLAN:    -no hematoma present   -will arrange outpatient follow up        ** ** INTERNAL MEDICINE ** **    Plan:      Admit to medical intensive care unit for multiple issues   Transferred out of icu 3/16- RRT called for AMS and pt pulling out HD catheter - profuse bleeding with hypotension - transferred back to icu and intubated for airway protection- await extubation today as patient is alert    Status post hemodialysis catheter placement      Acute anemia after pulling out HD catheter    Status post EGD-grossly normal,    serial H&H's-stable   For placement of tessio   General surgery following      Hyperkalemia with acute renal failure   Offending agents in the form of Aldactone/oral potassium supplements discontinued   IV fluid hydration   Ongoing hemodialysis   Status post Tessio 3/18/2020    BUN/creatinine remains improved with ongoing dialysis      Accelerated hypertension-i   improved on current regimen   Transfuse if hemoglobin falls below 7-currently 7.6      ETOH withdrawls leading to AMS ? Ammonia level    Ativan as needed for withdrawls         ** ** NEPHROLOGY ** **    IMPRESSION/RECOMMENDATIONS:       1. KAYLA stage III, established ischemic ATN, Started on RRT, 3/15. Remains anuric, for HD x 4 hours today. 2. High anion gap metabolic acidosis (renal failure), improving   3. Hypocalcemia 2/2 vitamin D deficiency, to start ergocalciferol   4. HTN, on metoprolol   5. New onset AF, with RVR, on metoprolol, diltiazem drip, unable to anticoagulate ? GIB   6. Right lung pneumonia, procalcitonin 0.70, on ampicillin-sulbactam   7. Shock liver, LFTs continue to improve   8. Thrombocytopenia   9.  COPD, methylprednisolone   10. Anemia, +FOBT, s/p EGD which was normal         Plan:   Hemodialysis yesterday with 2 kg volume off   CXR results reviewed, there is evidence of pulmonary edema   Discussed with the ICU team   Additional UF today for 2 hours with 2 Kg volume off, orders reviewed with the dialysis nurse         ** ** PULMONOLOGY ** **    IMPRESSION:   1. Atrial fibrillation with RVR - Currently on cardizem gtt at 5 mg/hr   2. Unable to use [facility] [place] of [place]   3. Hypertensive emergency --> uncontrolled 966O systolic   4. KAYLA - nephrology following --> ESRD on HD now   5. Hyperkalemia   6. Acute respiratory failure on Bipap   7. COPD with hx of tobacco use   8. Transaminitis   9. Hx of ETOH abuse   10. GIB - melanotic stools and positive FOBT. Surgery following.     Acute metabolic encephalopathy of unclear etiology (resolved)   · Normal ammonia   · ABG negative for hypercapnia   · Elevated TSH with normal T4   · A a OX3 today   A. fib   · New onset   · Responds well to Lopressor   · Continue Lopressor every 6 hours   · Chads vas >2 but currently GI bleed   · We will hold anticoagulation at this time   · Diltiazem drip stopped due to sinus arrest   ·    Hypertensive emergency   · Initial /90   · Remains hypertensive   · Unable to give renal likely agents due to KAYLA   · Not responsive to loop diuretics   · Continue Lopressor every 6 hours with holding parameter for map <120   · Will receive HD today   ·    Heart failure   · History of alcoholism   · Possible dilated cardiomyopathy   · proBNP 49,000   · Follow-up complete echo   ·    Prolonged QTc    · Avoid QT prolonging meds      Pleural effusion most likely 2/2 CHF   · CXR initially concerning for possible pneumonia   · CT abdomen shows right-sided pleural effusion   ·    COPD stable   · COPD on home oxygen at 2 L   · Diffuse bilateral wheezing   · Started on Brovana, Pulmicort, duo nebs    · Wean OFF Solu-Medrol       Transaminitis 2/2 alcoholic hepatitis   · Initial  AST 1300    · Acetaminophen levels within normal limits   · Awaiting urine and serum drug screening   · CT abdomen unremarkable   · Follow-up CMV EBV DELANO   · Follow-up hepatitis panel   · Continue to trend LFTs   · CT abdomen unremarkable for acute intra-abdominal pathologies   · Follow-up CK   ·    Pancreatitis? · Currently n.p.o.    · Was on fluids but stopped due to possible CHF exacerbation   · Lipase 492: Downtrending   · Abdominal pain in the upper left quadrant   · History of alcoholism   ·    Upper GI bleed   · Melanotic stools   · FOBT positive   · General surgery consulted   · We will transfuse if hemoglobin <7   · Continue Protonix BID    · For EGD negative   ·    Alcoholism   · Folate B12 within normal limits   · Has had history of withdrawal in the past   · Given age [de-identified] not in place   · Will most likely need Precedex if patient becomes symptomatic use benzodiazepines sparingly      KAYLA most likely prerenal   · CR from approximately 5 days ago 1.2   · Initial CR 3.4   · Currently up trending   · Follow-up urine studies   · Minimal urine output    · Nephrology consulted   · Continue to monitor renal function   ·    Hyperkalemia 2/2 oral potassium, spironolactone and dehydration   · Hold home potassium and spironolactone   · Initial EKG showed peaked T waves   · Received calcium gluconate, insulin D50 albuterol and Kayexalate   · We will continue to monitor potassium   ·    High anion gap metabolic acidosis    · Normal lactic acid   · Minimal increase in serum osmolar gap         Normocytic anemia   · Low iron and iron saturation with normal ferritin   · Normal folate and B12   ·    NSVT   Defer to cardiology    Increase BB if able      Thrombocytopenia   § INR 1.4 APTT 27.5    § Follow up PBS

## 2021-07-13 LAB
APTT: 52.1 S
APTT: 59.8 S
CK MB: 4.3 NG/ML (ref 0–9)
CK MB: 4.4 NG/ML (ref 0–9)
CK MB: 5.3 NG/ML (ref 0–9)
TOTAL CK: 24 IU/L (ref 38–234)
TOTAL CK: 26 IU/L (ref 38–234)
TOTAL CK: 27 IU/L (ref 38–234)
TROPONIN I: 0.38 NG/ML
TROPONIN I: 0.38 NG/ML
TROPONIN I: 0.4 NG/ML

## 2021-07-14 LAB — RESPIRATORY PANEL PCR: NORMAL

## 2021-07-16 LAB
HAV IGM SER IA-ACNC: NEGATIVE
HEPATITIS B CORE IGM ANTIBODY: NEGATIVE
HEPATITIS B SURFACE ANTIGEN: NEGATIVE
HEPATITIS C ANTIBODY: <0.1 S/CO RATIO (ref 0–0.9)

## 2024-06-05 NOTE — FLOWSHEET NOTE
03/18/20 2045   Vital Signs   BP (!) 121/51   Temp 98.4 °F (36.9 °C)   Pulse 105   Resp 14   SpO2 100 %   Weight 93 lb 0.6 oz (42.2 kg)   Weight Method Bed scale   Percent Weight Change -4.52   Pain Assessment   Pain Assessment CPOT   Pain Level 0   Post-Hemodialysis Assessment   Post-Treatment Procedures Blood returned;Catheter capped, clamped with Citrate x 2 ports   Machine Disinfection Process Acid/Vinegar Clean;Heat Disinfect; Exterior Machine Disinfection   Rinseback Volume (ml) 300 ml   Total Liters Processed (l/min) 91.1 l/min   Dialyzer Clearance Lightly streaked   Duration of Treatment (minutes) 240 minutes   Heparin amount administered during treatment (units) 0 units   Hemodialysis Intake (ml) 500 ml   Hemodialysis Output (ml) 2500 ml   NET Removed (ml) 2000 ml   Tolerated Treatment Good   Patient Response to Treatment Pt tolerated well; received albumin 25 gm X 2 IVPB during treatment for BP support during fluid removal;; blood rinsed back; catheter care per policy and protocol; net UF 2 liters; report given to Rufus Martin RN   Bilateral Breath Sounds Clear;Diminished   Physician Notified?  No you reach that goal, set a higher goal.  If the weather keeps you from walking outside, go for walks at the mall with a friend. Local schools and churches may have indoor gyms where you can walk.  Fitting a walk into your workday  Park several blocks away from work, or get off the bus a few stops early.  Use the stairs instead of the elevator, at least for a few floors.  Suggest holding meetings with colleagues during a walk inside or outside the building.  Use the restroom that is the farthest from your desk or workstation.  Use your morning and afternoon breaks to take quick 15 minutes walks.  Staying safe  Know your surroundings. Walk in a well-lighted, safe place. If it's dark, walk with a partner. Wear light-colored clothing. If you can, buy a vest or jacket that reflects light.  Carry a cell phone for emergencies.  Drink plenty of water. Take a water bottle with you when you walk. This is very important if it is hot out.  Be careful not to slip on wet or icy ground. You can buy \"grippers\" for your shoes to help keep you from slipping.  Pay attention to your walking surface. Use sidewalks and paths.  If you have health issues such as asthma, COPD, or heart problems, or if you haven't been active for a long time, check with your doctor before you start a new activity.  Where can you learn more?  Go to https://www.[employer].net/patientEd and enter R159 to learn more about \"Walking for Exercise: Care Instructions.\"  Current as of: June 5, 2023  Content Version: 14.1  © 0807-6056 [email or web address].   Care instructions adapted under license by [employer]. If you have questions about a medical condition or this instruction, always ask your healthcare professional. [email or web address] disclaims any warranty or liability for your use of this information.            [employer]

## (undated) DEVICE — SYRINGE MED 10ML LUERLOCK TIP W/O SFTY DISP

## (undated) DEVICE — DOUBLE BASIN SET: Brand: MEDLINE INDUSTRIES, INC.

## (undated) DEVICE — GLOVE SURG SZ 75 L12IN FNGR THK87MIL WHT LTX FREE

## (undated) DEVICE — MAGNETIC INSTR DRAPE 20X16: Brand: MEDLINE INDUSTRIES, INC.

## (undated) DEVICE — SURGICAL PROCEDURE PACK VASC MAJ CUST

## (undated) DEVICE — SOLUTION IV 500ML 0.9% SOD CHL PH 5 INJ USP VIAFLX PLAS

## (undated) DEVICE — LABEL MED 4 IN SURG PANEL W/ PEN STRL

## (undated) DEVICE — GOWN,AURORA,NONREINF,RAGLAN,L,STERILE: Brand: MEDLINE

## (undated) DEVICE — GLOVE SURG SZ 75 STD WHT LTX SYN POLYMER BEAD REINF ANTI RL

## (undated) DEVICE — SYRINGE MED 10ML POLYPR LUERSLIP TIP FLAT TOP W/O SFTY DISP

## (undated) DEVICE — GAUZE,SPONGE,POST-OP,4X3,STRL,LF: Brand: MEDLINE

## (undated) DEVICE — CATHETER HAD ADMIN SET LNG TERM PRECRV W/ SIDE H

## (undated) DEVICE — SET SURG INSTR MINI VASC

## (undated) DEVICE — CANNULA NSL ORAL AD FOR CAPNOFLEX CO2 O2 AIRLFE

## (undated) DEVICE — 15.5F X 24CM PRE-CURVED TITAN HD CATHETER FULL SET W/SIDEHOLES (CUFF 19CM FROM TIP): Brand: TITAN HD

## (undated) DEVICE — CLOTH SURG PREP PREOPERATIVE CHLORHEXIDINE GLUC 2% READYPREP

## (undated) DEVICE — DEFENDO AIR WATER SUCTION AND BIOPSY VALVE KIT FOR  OLYMPUS: Brand: DEFENDO AIR/WATER/SUCTION AND BIOPSY VALVE

## (undated) DEVICE — SHEET, T, LAPAROTOMY, STERILE: Brand: MEDLINE

## (undated) DEVICE — Z DUP USE 2257490 ADHESIVE SKIN CLSRE 036ML TPCL 2CTL CNCRLTE HIGH VSCSTY DRMB

## (undated) DEVICE — BLADE CLIPPER GEN PURP NS

## (undated) DEVICE — BLOCK BITE 60FR RUBBER ADLT DENTAL

## (undated) DEVICE — 3M™ MEDIPORE™ + PAD 3564: Brand: 3M™ MEDIPORE™

## (undated) DEVICE — NEEDLE HYPO 25GA L1.5IN BLU POLYPR HUB S STL REG BVL STR